# Patient Record
Sex: FEMALE | Race: OTHER | NOT HISPANIC OR LATINO | URBAN - METROPOLITAN AREA
[De-identification: names, ages, dates, MRNs, and addresses within clinical notes are randomized per-mention and may not be internally consistent; named-entity substitution may affect disease eponyms.]

---

## 2017-04-06 ENCOUNTER — EMERGENCY (EMERGENCY)
Facility: HOSPITAL | Age: 57
LOS: 0 days | Discharge: HOME | End: 2017-04-07
Admitting: INTERNAL MEDICINE

## 2017-06-27 DIAGNOSIS — Z79.899 OTHER LONG TERM (CURRENT) DRUG THERAPY: ICD-10-CM

## 2017-06-27 DIAGNOSIS — R05 COUGH: ICD-10-CM

## 2017-06-27 DIAGNOSIS — J45.909 UNSPECIFIED ASTHMA, UNCOMPLICATED: ICD-10-CM

## 2017-06-27 DIAGNOSIS — R50.9 FEVER, UNSPECIFIED: ICD-10-CM

## 2017-06-27 DIAGNOSIS — R06.02 SHORTNESS OF BREATH: ICD-10-CM

## 2017-06-27 DIAGNOSIS — Z91.041 RADIOGRAPHIC DYE ALLERGY STATUS: ICD-10-CM

## 2017-06-27 DIAGNOSIS — R53.1 WEAKNESS: ICD-10-CM

## 2017-12-18 ENCOUNTER — EMERGENCY (EMERGENCY)
Facility: HOSPITAL | Age: 57
LOS: 0 days | Discharge: HOME | End: 2017-12-18
Admitting: INTERNAL MEDICINE

## 2017-12-18 DIAGNOSIS — R05 COUGH: ICD-10-CM

## 2017-12-18 DIAGNOSIS — R68.83 CHILLS (WITHOUT FEVER): ICD-10-CM

## 2017-12-18 DIAGNOSIS — Z91.041 RADIOGRAPHIC DYE ALLERGY STATUS: ICD-10-CM

## 2017-12-18 DIAGNOSIS — J45.909 UNSPECIFIED ASTHMA, UNCOMPLICATED: ICD-10-CM

## 2017-12-18 DIAGNOSIS — Z79.899 OTHER LONG TERM (CURRENT) DRUG THERAPY: ICD-10-CM

## 2017-12-18 DIAGNOSIS — R07.89 OTHER CHEST PAIN: ICD-10-CM

## 2018-07-03 ENCOUNTER — EMERGENCY (EMERGENCY)
Facility: HOSPITAL | Age: 58
LOS: 0 days | Discharge: HOME | End: 2018-07-03
Admitting: PHYSICIAN ASSISTANT

## 2018-07-03 VITALS
RESPIRATION RATE: 18 BRPM | HEART RATE: 67 BPM | TEMPERATURE: 98 F | DIASTOLIC BLOOD PRESSURE: 78 MMHG | WEIGHT: 134.92 LBS | SYSTOLIC BLOOD PRESSURE: 130 MMHG | OXYGEN SATURATION: 97 %
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
## 2018-07-03 DIAGNOSIS — E78.5 HYPERLIPIDEMIA, UNSPECIFIED: ICD-10-CM

## 2018-07-03 DIAGNOSIS — S70.12XA CONTUSION OF LEFT THIGH, INITIAL ENCOUNTER: ICD-10-CM

## 2018-07-03 DIAGNOSIS — M54.5 LOW BACK PAIN: ICD-10-CM
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
## 2018-07-03 DIAGNOSIS — Z85.3 PERSONAL HISTORY OF MALIGNANT NEOPLASM OF BREAST: ICD-10-CM

## 2018-07-03 DIAGNOSIS — J45.909 UNSPECIFIED ASTHMA, UNCOMPLICATED: ICD-10-CM
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
## 2018-07-03 DIAGNOSIS — Z90.13 ACQUIRED ABSENCE OF BILATERAL BREASTS AND NIPPLES: ICD-10-CM

## 2018-07-03 DIAGNOSIS — Z91.041 RADIOGRAPHIC DYE ALLERGY STATUS: ICD-10-CM

## 2018-07-03 RX ORDER — METHOCARBAMOL 500 MG/1
2 TABLET, FILM COATED ORAL
Qty: 12 | Refills: 0
Start: 2018-07-03 | End: 2018-07-04

## 2018-07-03 RX ORDER — IBUPROFEN 200 MG
600 TABLET ORAL ONCE
Qty: 0 | Refills: 0 | Status: COMPLETED | OUTPATIENT
Start: 2018-07-03 | End: 2018-07-03

## 2018-07-03 RX ORDER — METHOCARBAMOL 500 MG/1
1000 TABLET, FILM COATED ORAL ONCE
Qty: 0 | Refills: 0 | Status: COMPLETED | OUTPATIENT
Start: 2018-07-03 | End: 2018-07-03

## 2018-07-03 RX ADMIN — METHOCARBAMOL 1000 MILLIGRAM(S): 500 TABLET, FILM COATED ORAL at 20:59

## 2018-07-03 RX ADMIN — Medication 600 MILLIGRAM(S): at 20:59

## 2018-07-03 NOTE — ED PROVIDER NOTE - MUSCULOSKELETAL, MLM
+ left lumbar para-vertebral tenderness without spinous process tenderness ; Spine appears normal, range of motion is not limited

## 2018-07-03 NOTE — ED ADULT TRIAGE NOTE - CHIEF COMPLAINT QUOTE
[place] tracks, luckily people got me out. Now I have pain in the pack and back of my left thigh - patient

## 2018-07-03 NOTE — ED PROVIDER NOTE - OBJECTIVE STATEMENT
57 y/o F, PMHx Dyslipidemia, Breast Cancer - s/p b/l mastectomy/chemotherapy & Asthma, presents to the ED with complaints of left lower back pain and thigh pain s/p mechanical fall six days ago. Patient states that as she was stepping on to a train, she slipped and fell on to her buttocks. She sustained a bruise along her left thigh that has been improving. A passerby helped her up and she has been ambulating without difficulty. She denies any head trauma/LOC, abdominal pain, bowel/bladder incontinence, urinary symptoms, numbness/tingling to lower extremities, chest pain, dyspnea and additional injuries. She denies any blood thinner use and has been taking Alleve at home with moderate symptomatic improvement.

## 2018-09-21 PROBLEM — C50.919 MALIGNANT NEOPLASM OF UNSPECIFIED SITE OF UNSPECIFIED FEMALE BREAST: Chronic | Status: ACTIVE | Noted: 2018-07-03

## 2018-09-21 PROBLEM — Z00.00 ENCOUNTER FOR PREVENTIVE HEALTH EXAMINATION: Status: ACTIVE | Noted: 2018-09-21

## 2018-09-21 PROBLEM — J45.909 UNSPECIFIED ASTHMA, UNCOMPLICATED: Chronic | Status: ACTIVE | Noted: 2018-07-03

## 2018-09-24 ENCOUNTER — APPOINTMENT (OUTPATIENT)
Dept: PULMONOLOGY | Facility: CLINIC | Age: 58
End: 2018-09-24

## 2018-11-02 ENCOUNTER — APPOINTMENT (OUTPATIENT)
Dept: PULMONOLOGY | Facility: CLINIC | Age: 58
End: 2018-11-02
Payer: COMMERCIAL

## 2018-11-02 VITALS
SYSTOLIC BLOOD PRESSURE: 102 MMHG | DIASTOLIC BLOOD PRESSURE: 62 MMHG | OXYGEN SATURATION: 94 % | BODY MASS INDEX: 21.53 KG/M2 | HEIGHT: 66 IN | WEIGHT: 134 LBS | TEMPERATURE: 98.5 F | HEART RATE: 77 BPM

## 2018-11-02 DIAGNOSIS — F51.04 PSYCHOPHYSIOLOGIC INSOMNIA: ICD-10-CM

## 2018-11-02 DIAGNOSIS — Z78.9 OTHER SPECIFIED HEALTH STATUS: ICD-10-CM

## 2018-11-02 DIAGNOSIS — J45.909 UNSPECIFIED ASTHMA, UNCOMPLICATED: ICD-10-CM

## 2018-11-02 PROCEDURE — 99204 OFFICE O/P NEW MOD 45 MIN: CPT

## 2018-11-02 RX ORDER — ANASTROZOLE TABLETS 1 MG/1
1 TABLET ORAL
Refills: 0 | Status: ACTIVE | COMMUNITY

## 2018-11-02 RX ORDER — TIOTROPIUM BROMIDE 18 UG/1
18 CAPSULE ORAL; RESPIRATORY (INHALATION)
Refills: 0 | Status: ACTIVE | COMMUNITY

## 2018-11-02 RX ORDER — ALBUTEROL SULFATE 90 UG/1
108 (90 BASE) AEROSOL, METERED RESPIRATORY (INHALATION)
Refills: 0 | Status: ACTIVE | COMMUNITY

## 2018-11-02 RX ORDER — FLUTICASONE PROPIONATE AND SALMETEROL 50; 250 UG/1; UG/1
250-50 POWDER RESPIRATORY (INHALATION)
Refills: 0 | Status: ACTIVE | COMMUNITY

## 2018-11-02 RX ORDER — ALBUTEROL SULFATE 2.5 MG/3ML
(2.5 MG/3ML) SOLUTION RESPIRATORY (INHALATION)
Refills: 0 | Status: ACTIVE | COMMUNITY

## 2018-11-02 RX ORDER — SIMVASTATIN 20 MG/1
20 TABLET, FILM COATED ORAL
Refills: 0 | Status: ACTIVE | COMMUNITY

## 2019-09-26 ENCOUNTER — OUTPATIENT (OUTPATIENT)
Dept: OUTPATIENT SERVICES | Facility: HOSPITAL | Age: 59
LOS: 1 days | Discharge: HOME | End: 2019-09-26

## 2019-09-26 DIAGNOSIS — M15.0 PRIMARY GENERALIZED (OSTEO)ARTHRITIS: ICD-10-CM

## 2019-09-26 DIAGNOSIS — M54.2 CERVICALGIA: ICD-10-CM

## 2020-09-28 ENCOUNTER — RESULT REVIEW (OUTPATIENT)
Age: 60
End: 2020-09-28

## 2020-12-20 ENCOUNTER — EMERGENCY (EMERGENCY)
Facility: HOSPITAL | Age: 60
LOS: 0 days | Discharge: HOME | End: 2020-12-20
Attending: EMERGENCY MEDICINE | Admitting: EMERGENCY MEDICINE
Payer: COMMERCIAL

## 2020-12-20 VITALS
DIASTOLIC BLOOD PRESSURE: 73 MMHG | WEIGHT: 160.06 LBS | SYSTOLIC BLOOD PRESSURE: 103 MMHG | RESPIRATION RATE: 18 BRPM | TEMPERATURE: 99 F | OXYGEN SATURATION: 96 % | HEART RATE: 82 BPM

## 2020-12-20 DIAGNOSIS — Z20.828 CONTACT WITH AND (SUSPECTED) EXPOSURE TO OTHER VIRAL COMMUNICABLE DISEASES: ICD-10-CM

## 2020-12-20 DIAGNOSIS — Z91.041 RADIOGRAPHIC DYE ALLERGY STATUS: ICD-10-CM

## 2020-12-20 DIAGNOSIS — J45.909 UNSPECIFIED ASTHMA, UNCOMPLICATED: ICD-10-CM

## 2020-12-20 PROCEDURE — 99284 EMERGENCY DEPT VISIT MOD MDM: CPT

## 2020-12-20 NOTE — ED PROVIDER NOTE - OBJECTIVE STATEMENT
59 y/o female with a PMH of breast cancer in remission, asthma, and DLD presents to the ED requesting COVID swab because she needs a negative test in order to get a breathing test done at [facility] on 12/14. pt denies fever, chills, chest pain, sob, abdominal pain, n/v/d/c, bodyaches, rashes, urinary symptoms, dizziness, or recent sick contacts.

## 2020-12-20 NOTE — ED PROVIDER NOTE - PHYSICAL EXAMINATION
Physical Exam    Vital Signs: I have reviewed the initial vital signs.  Constitutional: well-nourished, appears stated age, no acute distress  Eyes: Conjunctiva pink, Sclera clear  Cardiovascular: S1 and S2, regular rate, regular rhythm, well-perfused extremities, radial pulses equal and 2+ b/l.   Respiratory: unlabored respiratory effort, clear to auscultation bilaterally no wheezing, rales and rhonchi. pt is speaking full sentences. no accessory muscle use.   Gastrointestinal: soft, non-tender, nondistended abdomen, no pulsatile mass, normal bowl sounds, no rebound, no guarding, no organomegaly.   Musculoskeletal: FROM of b/l upper and lower extremities.   Integumentary: warm, dry, no rash  Neurologic: awake, alert

## 2020-12-20 NOTE — ED PROVIDER NOTE - NSFOLLOWUPINSTRUCTIONS_ED_ALL_ED_FT
Novel Coronavirus (COVID-19)  The Facts  What is a coronavirus?  Coronaviruses are a large family of viruses that cause illnesses ranging from the common cold  to more severe diseases such as Middle East Respiratory Syndrome (MERS) and Severe Acute  Respiratory Syndrome (SARS).  What is Novel Coronavirus (COVID-19)?  COVID-19 is a new strain of Coronavirus that has not been previously identified in humans. COVID-19  was identified in [place], [place], [place] in December 2019 (COVID-19). COVID-19 has  since been identified outside of [place], in a growing number of countries internationally, including  the [place].  Where can I find the most recent information about COVID-19?  The Centers for Disease Control and Prevention (CDC) is closely monitoring the outbreak caused by the  COVID-19. For the latest information about COVID- 19, visit the CDC website at  https://www.cdc.gov/coronavirus/index.html  How are coronaviruses spread?  Coronaviruses can be transmitted from person-to- person, usually after close contact with an infected person,  for example, in a household, workplace, or healthcare setting via droplets that become airborne after a cough  or sneeze by an affected person. These droplets can then infect a nearby person. It is likely transmission also  occurs by touching recently contaminated surfaces.  What are the symptoms of coronavirus infection?  It depends on the virus, but common signs include fever and/or respiratory symptoms such as  cough and shortness of breath. In more severe cases, infection can cause pneumonia, severe acute  respiratory syndrome, kidney failure and even death. Fortunately, most cases of COVID-19 have an  illness no different than the influenza “flu”. With a majority of these patients having mild symptoms  and overall mortality which appears to be not much different than the flu.  Is there a treatment for a COVID-19?  There is no specific treatment for disease caused by COVID-19. However, many of the symptoms can  be treated based on the patient’s clinical condition. Supportive care for infected persons can be highly  effective.  What can I do to protect myself?  Washing your hands, covering your cough, and disinfecting surfaces are the best precautionary  measures. It is also advisable to avoid close contact with anyone showing symptoms of respiratory  illness such as coughing and sneezing. Those with symptoms should wear a surgical mask when  around others.  What can I do to protect those around me?  If you have been identified as someone who may be infected with COVID-19, we recommend you  follow the self-isolation procedures outlined below to protect those around you and limit the spread  of this virus.   March 3, 2020  Recommendations for Patients Advised to Self-Isolate  for Possible COVID-19 Exposure  We recommend the below precautionary steps from now until 14 days from when you  returned from your travel or date of your last known possible contact:  - Do not go to work, school, or public areas. Avoid using public transportation, ride-sharing, or  taxis.  - As much as possible, separate yourself from other people in your home. If you can, you should  stay in a room and away from other people in your home. Also, you should use a separate  bathroom, if available.  - Wear the supplied mask whenever you are around other people.  - If you have a non-urgent medical appointment, please reschedule for a later date. If the  appointment is urgent, please call the healthcare provider and tell them that you are on selfisolation for possible COVID-19. This will help the healthcare provider’s office take steps to keep  other people from getting infected or exposed. If you can reschedule routine appointments, do  so.  - Wash your hands often with soap and water for at least 15 to 20 seconds or clean your hands  with an alcohol-based hand  that contains 60 to 95% alcohol, covering all surfaces of  your hands and rubbing them together until they feel dry. Soap and water should be used  preferentially if hands are visibly dirty.  - Cover your mouth and nose with a tissue when you cough or sneeze. Throw used tissues in a  lined trash can; immediately wash your hands.  - Avoid touching your eyes, nose, and mouth with your hands.  - Avoid sharing personal household items. You should not share dishes, drinking glasses, cups,  eating utensils, towels, or bedding with other people or pets in your home. After using these  items, they should be washed thoroughly with soap and water.  - Clean and disinfect all “high-touch” surfaces every day. High touch surfaces include counters,  tabletops, doorknobs, light switches, remote controls, bathroom fixtures, toilets, phones,  keyboards, tablets, and bedside tables. Also, clean any surfaces that may have blood, stool, or  body fluids on them.       If you develop worsening symptoms:  - If you develop worsening symptoms, such as severe shortness of breath, please call [phone] option [phone]. They will assist you in determining your next steps.  During your time on self-isolation do the following:  - Work from home if you are able to so.  - Limit social isolation by talking with friends and family on the phone or with face-time  - Talk with friends and relatives who don’t live with you about supporting each other if one  household has to be quarantined. For example, agree to drop groceries or other supplies at the  front door.  - Exercise and spend time outdoors away from others if able to do so.    Why didn’t I get tested for novel coronavirus (COVID-19)?  The number of available tests is very limited so strict rules exist for who is allowed to be tested.  [facility] has been authorized to perform testing and is currently working hard to be  able to start providing the test. Such testing is currently reserved for patients who have had  contact with someone infected with the virus, or those who are very sick a plus those who have  traveled to areas identified by the Centers for Disease Control and Prevention (CD) and will  require hospitalization.  What should I do now?  If you are well enough to be discharged home and are not in a high risk group to have  contracted the COVID-10, you should care for yourself at home exactly like you would if you  have Influenza “flu”. Follow all the standard guidelines about washing your hands, covering  your cough, etc.  You should return to the Emergency Department if you develop worse symptoms, trouble  breathing, chest pain, and/or a fever that doesn’t improve with over the counter  acetaminophen or ibuprofen.

## 2020-12-20 NOTE — ED PROVIDER NOTE - NS ED ROS FT
Gastroenterology Internal Medicine Internal Medicine CONST: No fever, chills or bodyaches  EYES: No pain, redness, drainage or visual changes.  ENT: No ear pain or discharge, nasal discharge or congestion. No sore throat  CARD: No chest pain, palpitations  RESP: No SOB, cough, hemoptysis. No hx of asthma or COPD  GI: No abdominal pain, N/V/D  : No urinary symptoms  MS: No joint pain, back pain or extremity pain/injury  SKIN: No rashes  NEURO: No headache, dizziness, paresthesias or LOC

## 2020-12-21 LAB — SARS-COV-2 RNA SPEC QL NAA+PROBE: SIGNIFICANT CHANGE UP

## 2021-02-12 ENCOUNTER — APPOINTMENT (OUTPATIENT)
Dept: ORTHOPEDIC SURGERY | Facility: CLINIC | Age: 61
End: 2021-02-12
Payer: COMMERCIAL

## 2021-02-12 VITALS — TEMPERATURE: 98.1 F

## 2021-02-12 DIAGNOSIS — M25.561 PAIN IN RIGHT KNEE: ICD-10-CM

## 2021-02-12 DIAGNOSIS — M25.562 PAIN IN LEFT KNEE: ICD-10-CM

## 2021-02-12 PROCEDURE — 99203 OFFICE O/P NEW LOW 30 MIN: CPT

## 2021-02-12 PROCEDURE — 73564 X-RAY EXAM KNEE 4 OR MORE: CPT | Mod: RT

## 2021-02-12 PROCEDURE — 99072 ADDL SUPL MATRL&STAF TM PHE: CPT

## 2021-03-05 NOTE — HISTORY OF PRESENT ILLNESS
[Pain Location] : pain [8] : a maximum pain level of 8/10 [Walking] : walking [Standing] : standing [Constant] : ~He/She~ states the symptoms seem to be constant [0] : a current pain level of 0/10 [None] : No exacerbating factors are noted [de-identified] : 61 year old female presents to the office today complaining of bilateral knee pain, worsened in the last year. Right knee is more painful than the left. Patient reports having seen an orthopedist in the past. She  reports pain that is achy in nature. There is swelling and clicking. She reports only being able to ambulate about 2 blocks before pain stops her. There is pain and difficulty with negotiating stairs. There is also pain with standing from a seated position and getting into a seated position. Patient report staking Tylenol if her pain is severe but denies any relief with this. She reports doing physical therapy in October 2020, but stopping because it did not give any relief. She reports having an injection in the past as well with only a few months of relief. Patient is here today to discuss options for pain relief.

## 2021-03-05 NOTE — ASSESSMENT
[FreeTextEntry1] : 61 year old female presents to the office complaining of bilateral knee pain left worse than right. Pain is 8/10, it is primarily while negotiating stairs. The pt is currently being treated for Sx at [facility] and was under the care of an orthopedic surgeon there. She was receiving injections but does not know what kind they are. She now comes here for Tx. We did discuss tx options and she was interested in gel injections. I do think that it is a good option for her, we will put in for approval and start her on them once we have the product. \par \par **ORDER GEL INJECTIONS FOR BILATERAL KNEES ***

## 2021-03-05 NOTE — PHYSICAL EXAM
[de-identified] : General appearance: well nourished and hydrated, pleasant, alert and oriented x 3, cooperative.\par Cardiovascular: no apparent abnormalities, no lower leg edema, no varicosities, pedal pulses are palpable.\par Neurologic: sensation is normal, no muscle weakness in upper or lower extremities\par Dermatologic no apparent skin lesions, moist, warm, no rash.\par Gait: nonantalgic.\par \par Left knee\par Inspection: no effusion or erythema.\par Wounds: none.\par Alignment: normal.\par Palpation: significant painful PF crepitus \par ROM: 0-120 degrees\par Ligamentous laxity: all ligaments appear stable\par Muscle Test: good quad strength.\par \par Right knee\par Inspection: no effusion or erythema.\par Wounds: none.\par Alignment: normal.\par Palpation: significant painful PF crepitus \par ROM: 0-120 degrees\par Ligamentous laxity: all ligaments appear stable\par Muscle Test: good quad strength.\par \par Left hip\par Inspection: No swelling or ecchymosis.\par Wounds: none.\par Palpation: non-tender.\par Stability: no instability.\par Strength: 5/5 all motor groups.\par ROM: no pain with FROM.\par Leg length: equal.\par \par Right hip\par Inspection: No swelling or ecchymosis.\par Wounds: none.\par Palpation: non-tender.\par Stability: no instability.\par Strength: 5/5 all motor groups.\par ROM: no pain with FROM.\par Leg length: equal. [de-identified] : Radiographs done today AP lateral and skyline of both knees shows well maintained TF joint spaces , severe PF joint space loss of the left knee and moderate on the right on skyline views

## 2021-04-01 ENCOUNTER — APPOINTMENT (OUTPATIENT)
Dept: ORTHOPEDIC SURGERY | Facility: HOSPITAL | Age: 61
End: 2021-04-01
Payer: COMMERCIAL

## 2021-04-01 VITALS — TEMPERATURE: 98.1 F

## 2021-04-01 DIAGNOSIS — M17.11 UNILATERAL PRIMARY OSTEOARTHRITIS, RIGHT KNEE: ICD-10-CM

## 2021-04-01 DIAGNOSIS — M17.12 UNILATERAL PRIMARY OSTEOARTHRITIS, LEFT KNEE: ICD-10-CM

## 2021-04-01 PROCEDURE — 20610 DRAIN/INJ JOINT/BURSA W/O US: CPT | Mod: RT

## 2021-04-01 PROCEDURE — 99072 ADDL SUPL MATRL&STAF TM PHE: CPT

## 2021-04-01 RX ORDER — HYALURONATE SOD, CROSS-LINKED 30 MG/3 ML
30 SYRINGE (ML) INTRAARTICULAR
Refills: 0 | Status: COMPLETED | OUTPATIENT
Start: 2021-04-01

## 2021-04-01 RX ORDER — HYALURONATE SOD, CROSS-LINKED 30 MG/3 ML
30 SYRINGE (ML) INTRAARTICULAR
Qty: 0 | Refills: 0 | Status: COMPLETED | OUTPATIENT
Start: 2021-04-01

## 2021-04-01 RX ADMIN — Medication 0 MG/3ML: at 00:00

## 2021-04-01 NOTE — ASSESSMENT
[FreeTextEntry1] : Discussed at length with the patient the planned Gel one injection. The risks, benefits, convalescence and alternatives were reviewed. The possible side effects discussed included but were not limited to: pain, swelling, heat and redness. There symptoms are generally mild but if they are extensive then contact the office. \par \par Following the discussion, the knee was prepped with Betadine and under sterile technique the Gel one injection was performed. The needle was introduced into the joint, aspiration was performed to ensure intra-articular placement and the medication was injected. Upon withdrawal of the needle the site was cleaned with alcohol and a Band-Aid applied. The patient tolerated the injection well and there were no adverse effects. Post injection instructions included not strenuous activity for 24 hours, cryotherapy and if there were any adverse effects to contact the office.\par

## 2021-04-01 NOTE — HISTORY OF PRESENT ILLNESS
[de-identified] : 61 year old female presents to the office today for Gel one injections into her bilateral arthritic knees.

## 2022-12-04 ENCOUNTER — EMERGENCY (EMERGENCY)
Facility: HOSPITAL | Age: 62
LOS: 0 days | Discharge: HOME | End: 2022-12-04
Attending: EMERGENCY MEDICINE | Admitting: EMERGENCY MEDICINE

## 2022-12-04 VITALS
SYSTOLIC BLOOD PRESSURE: 126 MMHG | DIASTOLIC BLOOD PRESSURE: 86 MMHG | HEART RATE: 90 BPM | TEMPERATURE: 98 F | RESPIRATION RATE: 22 BRPM | WEIGHT: 149.91 LBS | OXYGEN SATURATION: 94 %

## 2022-12-04 VITALS
OXYGEN SATURATION: 100 % | TEMPERATURE: 99 F | RESPIRATION RATE: 20 BRPM | SYSTOLIC BLOOD PRESSURE: 117 MMHG | HEART RATE: 100 BPM | DIASTOLIC BLOOD PRESSURE: 98 MMHG

## 2022-12-04 DIAGNOSIS — Z20.822 CONTACT WITH AND (SUSPECTED) EXPOSURE TO COVID-19: ICD-10-CM

## 2022-12-04 DIAGNOSIS — R05.9 COUGH, UNSPECIFIED: ICD-10-CM

## 2022-12-04 DIAGNOSIS — Z85.3 PERSONAL HISTORY OF MALIGNANT NEOPLASM OF BREAST: ICD-10-CM

## 2022-12-04 DIAGNOSIS — J45.909 UNSPECIFIED ASTHMA, UNCOMPLICATED: ICD-10-CM

## 2022-12-04 DIAGNOSIS — R06.02 SHORTNESS OF BREATH: ICD-10-CM

## 2022-12-04 DIAGNOSIS — Z91.041 RADIOGRAPHIC DYE ALLERGY STATUS: ICD-10-CM

## 2022-12-04 LAB
ALBUMIN SERPL ELPH-MCNC: 4.3 G/DL — SIGNIFICANT CHANGE UP (ref 3.5–5.2)
ALP SERPL-CCNC: 92 U/L — SIGNIFICANT CHANGE UP (ref 30–115)
ALT FLD-CCNC: 20 U/L — SIGNIFICANT CHANGE UP (ref 0–41)
ANION GAP SERPL CALC-SCNC: 12 MMOL/L — SIGNIFICANT CHANGE UP (ref 7–14)
AST SERPL-CCNC: 41 U/L — SIGNIFICANT CHANGE UP (ref 0–41)
BASOPHILS # BLD AUTO: 0.03 K/UL — SIGNIFICANT CHANGE UP (ref 0–0.2)
BASOPHILS NFR BLD AUTO: 0.3 % — SIGNIFICANT CHANGE UP (ref 0–1)
BILIRUB SERPL-MCNC: 0.3 MG/DL — SIGNIFICANT CHANGE UP (ref 0.2–1.2)
BUN SERPL-MCNC: 18 MG/DL — SIGNIFICANT CHANGE UP (ref 10–20)
CALCIUM SERPL-MCNC: 9.2 MG/DL — SIGNIFICANT CHANGE UP (ref 8.4–10.5)
CHLORIDE SERPL-SCNC: 98 MMOL/L — SIGNIFICANT CHANGE UP (ref 98–110)
CO2 SERPL-SCNC: 27 MMOL/L — SIGNIFICANT CHANGE UP (ref 17–32)
CREAT SERPL-MCNC: 0.7 MG/DL — SIGNIFICANT CHANGE UP (ref 0.7–1.5)
EGFR: 97 ML/MIN/1.73M2 — SIGNIFICANT CHANGE UP
EOSINOPHIL # BLD AUTO: 0.02 K/UL — SIGNIFICANT CHANGE UP (ref 0–0.7)
EOSINOPHIL NFR BLD AUTO: 0.2 % — SIGNIFICANT CHANGE UP (ref 0–8)
FLUAV AG NPH QL: SIGNIFICANT CHANGE UP
FLUBV AG NPH QL: SIGNIFICANT CHANGE UP
GLUCOSE SERPL-MCNC: 109 MG/DL — HIGH (ref 70–99)
HCT VFR BLD CALC: 43.1 % — SIGNIFICANT CHANGE UP (ref 37–47)
HGB BLD-MCNC: 14.5 G/DL — SIGNIFICANT CHANGE UP (ref 12–16)
IMM GRANULOCYTES NFR BLD AUTO: 0.3 % — SIGNIFICANT CHANGE UP (ref 0.1–0.3)
LYMPHOCYTES # BLD AUTO: 1.86 K/UL — SIGNIFICANT CHANGE UP (ref 1.2–3.4)
LYMPHOCYTES # BLD AUTO: 15.7 % — LOW (ref 20.5–51.1)
MCHC RBC-ENTMCNC: 29.4 PG — SIGNIFICANT CHANGE UP (ref 27–31)
MCHC RBC-ENTMCNC: 33.6 G/DL — SIGNIFICANT CHANGE UP (ref 32–37)
MCV RBC AUTO: 87.4 FL — SIGNIFICANT CHANGE UP (ref 81–99)
MONOCYTES # BLD AUTO: 0.97 K/UL — HIGH (ref 0.1–0.6)
MONOCYTES NFR BLD AUTO: 8.2 % — SIGNIFICANT CHANGE UP (ref 1.7–9.3)
NEUTROPHILS # BLD AUTO: 8.92 K/UL — HIGH (ref 1.4–6.5)
NEUTROPHILS NFR BLD AUTO: 75.3 % — HIGH (ref 42.2–75.2)
NRBC # BLD: 0 /100 WBCS — SIGNIFICANT CHANGE UP (ref 0–0)
PLATELET # BLD AUTO: 335 K/UL — SIGNIFICANT CHANGE UP (ref 130–400)
POTASSIUM SERPL-MCNC: 5.5 MMOL/L — HIGH (ref 3.5–5)
POTASSIUM SERPL-SCNC: 5.5 MMOL/L — HIGH (ref 3.5–5)
PROT SERPL-MCNC: 7.1 G/DL — SIGNIFICANT CHANGE UP (ref 6–8)
RBC # BLD: 4.93 M/UL — SIGNIFICANT CHANGE UP (ref 4.2–5.4)
RBC # FLD: 13.9 % — SIGNIFICANT CHANGE UP (ref 11.5–14.5)
RSV RNA NPH QL NAA+NON-PROBE: SIGNIFICANT CHANGE UP
SARS-COV-2 RNA SPEC QL NAA+PROBE: SIGNIFICANT CHANGE UP
SODIUM SERPL-SCNC: 137 MMOL/L — SIGNIFICANT CHANGE UP (ref 135–146)
WBC # BLD: 11.83 K/UL — HIGH (ref 4.8–10.8)
WBC # FLD AUTO: 11.83 K/UL — HIGH (ref 4.8–10.8)

## 2022-12-04 PROCEDURE — 99285 EMERGENCY DEPT VISIT HI MDM: CPT

## 2022-12-04 PROCEDURE — 71046 X-RAY EXAM CHEST 2 VIEWS: CPT | Mod: 26

## 2022-12-04 RX ORDER — IPRATROPIUM/ALBUTEROL SULFATE 18-103MCG
3 AEROSOL WITH ADAPTER (GRAM) INHALATION ONCE
Refills: 0 | Status: COMPLETED | OUTPATIENT
Start: 2022-12-04 | End: 2022-12-04

## 2022-12-04 RX ORDER — ACETAMINOPHEN 500 MG
650 TABLET ORAL ONCE
Refills: 0 | Status: COMPLETED | OUTPATIENT
Start: 2022-12-04 | End: 2022-12-04

## 2022-12-04 RX ORDER — KETOROLAC TROMETHAMINE 30 MG/ML
15 SYRINGE (ML) INJECTION ONCE
Refills: 0 | Status: DISCONTINUED | OUTPATIENT
Start: 2022-12-04 | End: 2022-12-04

## 2022-12-04 RX ORDER — ALBUTEROL 90 UG/1
2.5 AEROSOL, METERED ORAL ONCE
Refills: 0 | Status: COMPLETED | OUTPATIENT
Start: 2022-12-04 | End: 2022-12-04

## 2022-12-04 RX ADMIN — Medication 3 MILLILITER(S): at 09:30

## 2022-12-04 RX ADMIN — ALBUTEROL 2.5 MILLIGRAM(S): 90 AEROSOL, METERED ORAL at 15:05

## 2022-12-04 RX ADMIN — Medication 650 MILLIGRAM(S): at 14:19

## 2022-12-04 RX ADMIN — Medication 650 MILLIGRAM(S): at 09:06

## 2022-12-04 RX ADMIN — Medication 3 MILLILITER(S): at 09:07

## 2022-12-04 RX ADMIN — Medication 125 MILLIGRAM(S): at 10:00

## 2022-12-04 RX ADMIN — Medication 15 MILLIGRAM(S): at 17:24

## 2022-12-04 RX ADMIN — Medication 3 MILLILITER(S): at 13:07

## 2022-12-04 NOTE — ED PROVIDER NOTE - PHYSICAL EXAMINATION
--EXAM--  VITAL SIGNS: I have reviewed vs documented at present.  CONSTITUTIONAL: Well-developed; well-nourished; in no acute distress.   SKIN: Warm and dry, no acute rash.   HEAD: Normocephalic; atraumatic.    NECK: Supple; non tender.  CARD: S1, S2, Regular rate and rhythm.   RESP: positive wheezes,no rales or rhonchi.    EXT: Normal ROM.   NEURO: Alert, oriented, grossly unremarkable. Strength 5/5 in all extremities. Sensation intact throughout.  PSYCH: Cooperative, appropriate.

## 2022-12-04 NOTE — ED ADULT TRIAGE NOTE - CHIEF COMPLAINT QUOTE
Pt states "I have shortness of breath and a cough". Pt states "I have shortness of breath, a cough and my neck hurts for one week".

## 2022-12-04 NOTE — ED PROVIDER NOTE - NS ED ROS FT
Review of Systems:  	•	CONSTITUTIONAL - no fever, no diaphoresis, no chills  	•	SKIN - no rash  	•	HEMATOLOGIC - no bleeding, no bruising  	•	EYES - no eye pain, no blurry vision  	•	ENT - no change in hearing, no sore throat, no ear pain or tinnitus  	•	RESPIRATORY -  shortness of breath, no cough  	•	CARDIAC - no chest pain, no palpitations  	•	GI - no abd pain, no nausea, no vomiting, no diarrhea, no constipation  	•	GENITO-URINARY - no discharge, no dysuria; no hematuria, no increased urinary frequency  	•	MUSCULOSKELETAL - no joint paint, no swelling, no redness  	•	NEUROLOGIC - no weakness, no headache, no paresthesias, no LOC  	•	PSYCH - no anxiety, non suicidal, non homicidal, no hallucination, no depression

## 2022-12-04 NOTE — ED PROVIDER NOTE - OBJECTIVE STATEMENT
62-year-old female presents to the ED for evaluation of shortness of breath and cough.  Patient has a history of asthma

## 2022-12-04 NOTE — ED PROVIDER NOTE - PATIENT PORTAL LINK FT
You can access the FollowMyHealth Patient Portal offered by [facility] by registering at the following website: [email or web address]. By joining [employer]’s FollowMyHealth portal, you will also be able to view your health information using other applications (apps) compatible with our system.

## 2022-12-04 NOTE — ED PROVIDER NOTE - PROGRESS NOTE DETAILS
Spoke to patient and patient's .  Patient would like to remain in the ED for observation to see how she feels after she rest and not eat something Reevaluated patient lungs clear to auscultation.  Patient is feeling better spoke with  as well patient will be discharged

## 2022-12-04 NOTE — ED PROVIDER NOTE - CLINICAL SUMMARY MEDICAL DECISION MAKING FREE TEXT BOX
patient given albuterol treatment as well as Solu-Medrol in addition given Toradol patient was observed here in the emergency department she improved here reassessed she is clear to auscultation bilaterally no evidence of pneumonia she is able to ambulate up and down the hallway with no worsening symptoms I will discharge follow-up to her PMD in the next 24 hours Patient's symptoms are not consistent with ACS EKG is not consistent with STEMI unlikely PE given patient's physical exam findings

## 2022-12-04 NOTE — ED ADULT NURSE NOTE - NSICDXPASTMEDICALHX_GEN_ALL_CORE_FT
Child & Teen Check Up Year 10-       Child Health History       SPORTS QUESTIONNAIRE:  ======================                             Grade: 7th                    Sports: Football  1. no - Has a doctor ever denied or restricted your participation in sports for any reason or told you to give up sports?  2. Yes - Do you have an ongoing medical condition (like diabetes,asthma, anemia, infections)? Asthma  3. no - Are you currently taking any prescription or nonprescription (over-the-counter) medicines or pills?    4. no - Do you have allergies to medicines, pollens, foods or stinging insects?    5. no - Have you ever spent the night in a hospital?  6. no - Have you ever had surgery?   7. no - Have you ever passed out or nearly passed out DURING exercise?  8. no - Have you ever passed out or nearly passed out AFTER exercise?  9. no -Have you ever had discomfort, pain, tightness, or pressure in your chest during exercise?  10. no -Does your heart race or skip beats (irregular beats) during exercise?   11. no -Has a doctor ever told you that you have ;high blood pressure, a heart murmur, high cholesterol,a heart infection, Rheumatic fever, Kawasaki's Disease?  12. no - Has a doctor ever ordered a test for your heart? (example, ECG/EKG, Echocardiogram, stress test)  13. no -Do you ever get lightheaded or feel more short of breath than expected during exercise?   14. no-Have you ever had an unexplained seizure?   15. no - Do you get more tired or short of breath more quickly than your friends during exercise?   16. no - Has any family member or relative  of heart problems or had an unexpected or unexplained sudden death before age 50 (including unexplained drowning, unexplained car accident or sudden infant death syndrome)?  17. no - Does anyone in your family have hypertrophic cardiomyopathy, Marfan Syndrome, arrhythmogenic right ventricular cardiomyopathy, long QT syndrome, short QT syndrome, Brugada syndrome,  "or catecholaminergic polymorphic ventricular tachycardia?    18. no - Does anyone in your family have a heart problem, pacemaker, or implanted defibrillator? Maternal grandmother - history of CHF; mom's cousin had pacemaker placed at age 16  19. yes -Has anyone in your family had unexplained fainting, unexplained seizures, or near drowning?   20. no - Have you ever had an injury, like a sprain, muscle or ligament tear or tendonitis, that caused you to miss a practice or game?   21. no - Have you had any broken or fractured bones, or dislocated joints?   22 no - Have you had an injury that required x-rays, MRI, CT, surgery, injections, therapy, a brace, a cast, or crutches?    23. no - Have you ever had a stress fracture?   24. no - Have you ever been told that you have or have you had an x-ray for neck instability or atlantoaxial instability? (Down syndrome or dwarfism)  25. no - Do you regularly use a brace, orthotics or assistive device?    26. no -Do you have a bone,muscle, or joint injury that bothers you?   27. no- Do any of your joints become painful, swollen, feel warm or look red?   28. no -Do you have any history of juvenile arthritis or connective tissue disease?   29. yes - Has a doctor ever told you that you have asthma or allergies?   30. yes - Do you cough, wheeze, have chest tightness, or have difficulty breathing during or after exercise? Sometimes with exercise, patient feels \"out of breath a lot, my chest feels tight, and my ribs start hurting\" (pointing to left side)   31. yes - Is there anyone in your family who has asthma?  Mom, sisters   32. yes - Have you ever used an inhaler or taken asthma medicine?   33. no - Do you develop a rash or hives when you exercise?   34. no - Were you born without or are you missing a kidney, an eye, a testicle (males), or any other organ?  35. no- Do you have groin pain or a painful bulge or hernia in the groin area?   36. no - Have you had infectious " mononucleosis (mono) within the last month?   37. no - Do you have any rashes, pressure sores, or other skin problems?   38. no - Have you had a herpes or MRSA skin infection?    39. no - Have you ever had a head injury or concussion?   40. no - Have you ever had a hit or blow in the head that caused confusion, prolonged headaches, or memory problems?    41. no - Do you have a history of seizure disorder?    42. yes - Do you have headaches with exercise?   43. no - Have you ever had numbness, tingling or weakness in your arms or legs after being hit or falling?   44. no - Have you ever been unable to move your arms or legs after being hit or falling?   45. no -Have you ever become ill while exercising in the heat?  46. yes -Do you get frequent muscle cramps when exercising?  47. no - Do you or someone in your family have sickle cell trait or disease?    48. no - Have you had any problems with your eyes or vision?   49. no - Have you had any eye injuries?   50. no - Do you wear glasses or contact lenses?    51. no - Do you wear protective eyewear, such as goggles or a face shield?  52. no- Do you worry about your weight?    53. no - Are you trying to or has anyone recommended that you gain or lose weight?    54. Yes - Are you on a special diet or do you avoid certain types of foods? Avoids meat - does not like meat  55. no- Have you ever had an eating disorder?   56. no - Do you have any concerns that you would like to discuss with a doctor?      Asthma History  Not on controller medication  Prescribed albuterol but does not have any at home  Feels short of breath very frequently  No night awakening, does wake up at night feeling short of breath (1-2x/week)  Daily shortness of breath with and without exercise     Growth Percentile:    Wt Readings from Last 3 Encounters:   09/19/18 100 lb 6.4 oz (45.5 kg) (56 %)*   12/31/14 66 lb 9.6 oz (30.2 kg) (62 %)*   11/19/14 67 lb 12.8 oz (30.8 kg) (69 %)*     * Growth  "percentiles are based on CDC 2-20 Years data.      Ht Readings from Last 2 Encounters:   18 5' 2.75\" (159.4 cm) (75 %)*   14 4' 5.5\" (135.9 cm) (64 %)*     * Growth percentiles are based on CDC 2-20 Years data.    44 %ile based on CDC 2-20 Years BMI-for-age data using vitals from 2018.    Visit Vitals: /68  Pulse 77  Temp 98.4  F (36.9  C) (Oral)  Resp 16  Ht 5' 2.75\" (159.4 cm)  Wt 100 lb 6.4 oz (45.5 kg)  SpO2 100%  BMI 17.93 kg/m2  BP Percentile: Blood pressure percentiles are 33 % systolic and 72 % diastolic based on the 2017 AAP Clinical Practice Guideline. Blood pressure percentile targets: 90: 121/75, 95: 125/79, 95 + 12 mmH/91.      Vision Screen: Passed.  Hearing Screen: Passed.  Informant: Patient and aunt    Family/Patient speaks English and so an  was not used.  Family History:   Family History   Problem Relation Age of Onset     Asthma Mother      Diabetes Maternal Grandmother      Coronary Artery Disease Maternal Grandmother      Cerebrovascular Disease Maternal Grandmother      Hypertension Maternal Grandmother      Breast Cancer Maternal Aunt      Anesthesia Reaction Maternal Aunt        Dyslipidemia Screening:  Pediatric hyperlipidemia risk factors discussed today: No increased risk  Lipid screening performed (recommended if any risk factors): No    Social History:     Did the family/guardian worry about wether their food would run out before they got money to buy more? No  Did the family/guardian find that the food they bought didn't last long enough and they didn't have money to get more?  No     Social History     Social History     Marital status: Single     Spouse name: N/A     Number of children: N/A     Years of education: N/A     Social History Main Topics     Smoking status: Never Smoker     Smokeless tobacco: Never Used      Comment: not around 2nd hand smoke     Alcohol use None     Drug use: None     Sexual activity: Not Asked "     Other Topics Concern     None     Social History Narrative       Medical History:   Past Medical History:   Diagnosis Date     Uncomplicated asthma        Family History and past Medical History reviewed and unchanged/updated.    Parental/or patient concerns:   Asthma  - diagnosed some time ago  - has not been on any medications  - wakes up at night several times a week  - short of breath and chest tightness every time he exercises       Daily Activities:  Nutrition:    Describe intake: fruits, meats, vegetables    Environmental Risks:  Lead exposure: No  TB exposure: No  Guns in house:None    STI Screening:  STI (including HIV) risk behaviors discussed today: No    Development:  Any concerns about how your child is behaving, learning or developing?  No concerns.     Dental:  Has child been to a dentist this year? No-Verbal referral made  for dental check-up     Mental Health:  Teen Screen Discussed?: No     HEADSSS SCREENING:    HOME  Do you get along with your parents/siblings? Yes  Do you have at least one adult you can really talk to? Yes    EDUCATION  Do you have career or college plans after high school? Details: Unsure    ACTIVITIES  Do you get some exercise at least 3 times a week? Yes  Do you feel you are about the right weight for your height? Yes    DRUGS   Do you smoke cigarettes or chew tobacco? No   Do you drink alcohol or use any type of drugs? No    SUICIDE/DEPRESSION  Do you ever feel down or depressed? No    Nutrition: Healthy between-meal snacks, Safety: Seat belts, helmets. and Guidance: School attendance, homework         ROS   GENERAL: no recent fevers and activity level has been normal  SKIN: Negative for rash, birthmarks, acne, pigmentation changes  HEENT: Negative for hearing problems, vision problems, nasal congestion, eye discharge and eye redness  RESP: No cough, wheezing, difficulty breathing  CV: No cyanosis, fatigue with feeding  GI: Normal stools for age, no diarrhea or  "constipation   : Normal urination, no disharge or painful urination  MS: No swelling, muscle weakness, joint problems  NEURO: Moves all extremeties normally, normal activity for age  ALLERGY/IMMUNE: See allergy in history         Physical Exam:   /68  Pulse 77  Temp 98.4  F (36.9  C) (Oral)  Resp 16  Ht 5' 2.75\" (159.4 cm)  Wt 100 lb 6.4 oz (45.5 kg)  SpO2 100%  BMI 17.93 kg/m2     GENERAL: Alert, well nourished, well developed, no acute distress, interacts appropriately for age  SKIN: skin is clear, no rash, acne, abnormal pigmentation or lesions  HEAD: The head is normocephalic.  EYES:The conjunctivae and cornea normal. PERRL, EOMI, Light reflex is symmetric and no eye movement on cover/uncover test. Sharp optic discs  EARS: The external auditory canals are clear and the tympanic membranes are normal; gray and transluscent.  NOSE: Clear, no discharge or congestion  MOUTH/THROAT: The throat is clear, tonsils:normal, no exudate or lesions. Normal teeth without obvious abnormalities  NECK: The neck is supple and thyroid is normal, no masses  LYMPH NODES: No adenopathy  LUNGS: The lung fields are clear to auscultation,no rales, rhonchi, wheezing or retractions  HEART: The precordium is quiet. Rhythm is regular. S1 and S2 are normal. No murmurs.  ABDOMEN: The bowel sounds are normal. Abdomen soft, non tender,  non distended, no masses or hepatosplenomegaly.  EXTREMITIES: Symmetric extremities, FROM, no deformities. Spine is straight, no scoliosis  NEUROLOGIC: No focal findings. Cranial nerves grossly intact: DTR's normal. Normal gait, strength and tone  Shoulder: Normal  Elbow: Normal  Hand/Wrist: Normal  Back: Normal  Quad/Ham: Normal  Knee: Normal  Ankle/Feet: Normal  Heel/Toe: Normal  Duck walk: Normal  : declined            Assessment and Plan   #[facility] 11 year old  - growth/development within normal limits  - achieving appropriate milestones   - vaccinations as ordered  - growth chart reviewed, " reassurance provided  - follow up in 4 weeks for asthma follow up    #Moderate persistent asthma   - albuterol q6h scheduled for 1 week  - flovent 2 puffs BID daily  - after 1 week, albuterol PRN   - follow up in 4 weeks    #Chest tightness with exercise  - likely 2/2 to exercise  - EKG checked - early repolarization noted  - ECHO pending    #Sports physical  - not cleared to play until:   - asthma is better controlled   - ECHO is done  - return in 4 weeks for follow up and can re-asses     BMI at 44 %ile based on CDC 2-20 Years BMI-for-age data using vitals from 9/19/2018.  No weight concerns.           PAST MEDICAL HISTORY:  Asthma     Breast cancer

## 2022-12-04 NOTE — ED PROVIDER NOTE - NS ED ATTENDING STATEMENT MOD
This was a shared visit with the MACEY. I reviewed and verified the documentation and independently performed the documented:

## 2022-12-04 NOTE — ED PROVIDER NOTE - ATTENDING APP SHARED VISIT CONTRIBUTION OF CARE
I was present for and supervised the key and critical aspects of the procedures performed during the care of the patient. Patient is a 62-year-old female presents the emergency department for evaluation of shortness of breath and wheezing she has a history of asthma history of admissions however never intubated denies any chest pain denies any fevers chills denies any abdominal pain back pain or extremity weakness    On physical exam patient is normocephalic atraumatic pupils are equal round reactive light accommodation extraocular muscles intact oropharynx clear chest reveals diffuse wheezing however no increased work of breathing no respiratory distress abdomen soft nontender nondistended bowel sounds positive no guarding no rebound extremities patient is able ambulate well radial pulses 2+ pedal pulses 2+ no focal deficits patient given albuterol treatment as well as Solu-Medrol in addition given Toradol patient was observed here in the emergency department she improved here reassessed she is clear to auscultation bilaterally no evidence of pneumonia she is able to ambulate up and down the hallway with no worsening symptoms I will discharge follow-up to her PMD in the next 24 hours Patient's symptoms are not consistent with ACS EKG is not consistent with STEMI unlikely PE given patient's physical exam findings

## 2023-01-07 ENCOUNTER — NON-APPOINTMENT (OUTPATIENT)
Age: 63
End: 2023-01-07

## 2023-01-07 ENCOUNTER — EMERGENCY (EMERGENCY)
Facility: HOSPITAL | Age: 63
LOS: 0 days | Discharge: HOME | End: 2023-01-07
Attending: EMERGENCY MEDICINE | Admitting: EMERGENCY MEDICINE
Payer: COMMERCIAL

## 2023-01-07 VITALS
WEIGHT: 149.91 LBS | SYSTOLIC BLOOD PRESSURE: 119 MMHG | OXYGEN SATURATION: 96 % | DIASTOLIC BLOOD PRESSURE: 72 MMHG | TEMPERATURE: 100 F | HEART RATE: 87 BPM | RESPIRATION RATE: 18 BRPM

## 2023-01-07 DIAGNOSIS — Z91.041 RADIOGRAPHIC DYE ALLERGY STATUS: ICD-10-CM

## 2023-01-07 DIAGNOSIS — Z90.10 ACQUIRED ABSENCE OF UNSPECIFIED BREAST AND NIPPLE: ICD-10-CM

## 2023-01-07 DIAGNOSIS — Z85.3 PERSONAL HISTORY OF MALIGNANT NEOPLASM OF BREAST: ICD-10-CM

## 2023-01-07 DIAGNOSIS — E78.5 HYPERLIPIDEMIA, UNSPECIFIED: ICD-10-CM

## 2023-01-07 DIAGNOSIS — J45.909 UNSPECIFIED ASTHMA, UNCOMPLICATED: ICD-10-CM

## 2023-01-07 DIAGNOSIS — M79.10 MYALGIA, UNSPECIFIED SITE: ICD-10-CM

## 2023-01-07 DIAGNOSIS — R50.9 FEVER, UNSPECIFIED: ICD-10-CM

## 2023-01-07 DIAGNOSIS — U07.1 COVID-19: ICD-10-CM

## 2023-01-07 DIAGNOSIS — R05.1 ACUTE COUGH: ICD-10-CM

## 2023-01-07 LAB
FLUAV AG NPH QL: SIGNIFICANT CHANGE UP
FLUBV AG NPH QL: SIGNIFICANT CHANGE UP
RSV RNA NPH QL NAA+NON-PROBE: SIGNIFICANT CHANGE UP
SARS-COV-2 RNA SPEC QL NAA+PROBE: DETECTED

## 2023-01-07 PROCEDURE — 99284 EMERGENCY DEPT VISIT MOD MDM: CPT

## 2023-01-07 RX ORDER — ACETAMINOPHEN 500 MG
975 TABLET ORAL ONCE
Refills: 0 | Status: COMPLETED | OUTPATIENT
Start: 2023-01-07 | End: 2023-01-07

## 2023-01-07 NOTE — ED PROVIDER NOTE - OBJECTIVE STATEMENT
63 yo female with a pmh of hld, asthma, and breast ca s/p double mastectomy presents c/o subjective fever/cough/body aches that started last night. pt states "I think I have COVID". pt denies any other symptoms including atypical headache, recent illness/travel, abdominal pain, chest pain, or SOB.

## 2023-01-07 NOTE — ED PROVIDER NOTE - NS ED ROS FT
Goal Outcome Evaluation:  Plan of Care Reviewed With: patient           Outcome Summary: Patient states that he is feeling better today. Up at maty with no ill effects, meals tolerated. No complaints of pain or shortness of breath, O2 SAT remains WNL on room air.Patient comliant with treatment, monitoring continues.   Except as documented in the HPI, all other systems are negative.

## 2023-01-07 NOTE — ED PROVIDER NOTE - ATTENDING APP SHARED VISIT CONTRIBUTION OF CARE
62-year-old female history of asthma, breast cancer presents with fever and cough since yesterday.  Patient concerned for COVID.  No chest pain or shortness of breath.  On exam patient in NAD, AAOx3, seen ambulate with steady gait, OP clear, MMM, lungs CTA B/L, no edema

## 2023-01-07 NOTE — ED PROVIDER NOTE - PATIENT PORTAL LINK FT
You can access the FollowMyHealth Patient Portal offered by [facility] by registering at the following website: [email or web address]. By joining [employer]’s FollowMyHealth portal, you will also be able to view your health information using other applications (apps) compatible with our system.

## 2023-11-30 ENCOUNTER — INPATIENT (INPATIENT)
Facility: HOSPITAL | Age: 63
LOS: 10 days | Discharge: HOME | DRG: 189 | End: 2023-12-11
Attending: INTERNAL MEDICINE | Admitting: STUDENT IN AN ORGANIZED HEALTH CARE EDUCATION/TRAINING PROGRAM
Payer: COMMERCIAL

## 2023-11-30 VITALS
TEMPERATURE: 98 F | WEIGHT: 149.91 LBS | RESPIRATION RATE: 20 BRPM | HEIGHT: 66 IN | DIASTOLIC BLOOD PRESSURE: 80 MMHG | HEART RATE: 100 BPM | SYSTOLIC BLOOD PRESSURE: 122 MMHG

## 2023-11-30 DIAGNOSIS — J06.9 ACUTE UPPER RESPIRATORY INFECTION, UNSPECIFIED: ICD-10-CM

## 2023-11-30 LAB
ALBUMIN SERPL ELPH-MCNC: 4.9 G/DL — SIGNIFICANT CHANGE UP (ref 3.5–5.2)
ALBUMIN SERPL ELPH-MCNC: 4.9 G/DL — SIGNIFICANT CHANGE UP (ref 3.5–5.2)
ALP SERPL-CCNC: 102 U/L — SIGNIFICANT CHANGE UP (ref 30–115)
ALP SERPL-CCNC: 102 U/L — SIGNIFICANT CHANGE UP (ref 30–115)
ALT FLD-CCNC: 28 U/L — SIGNIFICANT CHANGE UP (ref 0–41)
ALT FLD-CCNC: 28 U/L — SIGNIFICANT CHANGE UP (ref 0–41)
ANION GAP SERPL CALC-SCNC: 13 MMOL/L — SIGNIFICANT CHANGE UP (ref 7–14)
ANION GAP SERPL CALC-SCNC: 13 MMOL/L — SIGNIFICANT CHANGE UP (ref 7–14)
AST SERPL-CCNC: 29 U/L — SIGNIFICANT CHANGE UP (ref 0–41)
AST SERPL-CCNC: 29 U/L — SIGNIFICANT CHANGE UP (ref 0–41)
BASOPHILS # BLD AUTO: 0.03 K/UL — SIGNIFICANT CHANGE UP (ref 0–0.2)
BASOPHILS # BLD AUTO: 0.03 K/UL — SIGNIFICANT CHANGE UP (ref 0–0.2)
BASOPHILS NFR BLD AUTO: 0.3 % — SIGNIFICANT CHANGE UP (ref 0–1)
BASOPHILS NFR BLD AUTO: 0.3 % — SIGNIFICANT CHANGE UP (ref 0–1)
BILIRUB SERPL-MCNC: 0.3 MG/DL — SIGNIFICANT CHANGE UP (ref 0.2–1.2)
BILIRUB SERPL-MCNC: 0.3 MG/DL — SIGNIFICANT CHANGE UP (ref 0.2–1.2)
BUN SERPL-MCNC: 24 MG/DL — HIGH (ref 10–20)
BUN SERPL-MCNC: 24 MG/DL — HIGH (ref 10–20)
CALCIUM SERPL-MCNC: 10 MG/DL — SIGNIFICANT CHANGE UP (ref 8.4–10.4)
CALCIUM SERPL-MCNC: 10 MG/DL — SIGNIFICANT CHANGE UP (ref 8.4–10.4)
CHLORIDE SERPL-SCNC: 99 MMOL/L — SIGNIFICANT CHANGE UP (ref 98–110)
CHLORIDE SERPL-SCNC: 99 MMOL/L — SIGNIFICANT CHANGE UP (ref 98–110)
CO2 SERPL-SCNC: 27 MMOL/L — SIGNIFICANT CHANGE UP (ref 17–32)
CO2 SERPL-SCNC: 27 MMOL/L — SIGNIFICANT CHANGE UP (ref 17–32)
CREAT SERPL-MCNC: 0.7 MG/DL — SIGNIFICANT CHANGE UP (ref 0.7–1.5)
CREAT SERPL-MCNC: 0.7 MG/DL — SIGNIFICANT CHANGE UP (ref 0.7–1.5)
EGFR: 97 ML/MIN/1.73M2 — SIGNIFICANT CHANGE UP
EGFR: 97 ML/MIN/1.73M2 — SIGNIFICANT CHANGE UP
EOSINOPHIL # BLD AUTO: 0 K/UL — SIGNIFICANT CHANGE UP (ref 0–0.7)
EOSINOPHIL # BLD AUTO: 0 K/UL — SIGNIFICANT CHANGE UP (ref 0–0.7)
EOSINOPHIL NFR BLD AUTO: 0 % — SIGNIFICANT CHANGE UP (ref 0–8)
EOSINOPHIL NFR BLD AUTO: 0 % — SIGNIFICANT CHANGE UP (ref 0–8)
FLUAV AG NPH QL: SIGNIFICANT CHANGE UP
FLUAV AG NPH QL: SIGNIFICANT CHANGE UP
FLUBV AG NPH QL: SIGNIFICANT CHANGE UP
FLUBV AG NPH QL: SIGNIFICANT CHANGE UP
GAS PNL BLDV: SIGNIFICANT CHANGE UP
GAS PNL BLDV: SIGNIFICANT CHANGE UP
GLUCOSE SERPL-MCNC: 112 MG/DL — HIGH (ref 70–99)
GLUCOSE SERPL-MCNC: 112 MG/DL — HIGH (ref 70–99)
HCT VFR BLD CALC: 40.7 % — SIGNIFICANT CHANGE UP (ref 37–47)
HCT VFR BLD CALC: 40.7 % — SIGNIFICANT CHANGE UP (ref 37–47)
HGB BLD-MCNC: 13.6 G/DL — SIGNIFICANT CHANGE UP (ref 12–16)
HGB BLD-MCNC: 13.6 G/DL — SIGNIFICANT CHANGE UP (ref 12–16)
IMM GRANULOCYTES NFR BLD AUTO: 0.7 % — HIGH (ref 0.1–0.3)
IMM GRANULOCYTES NFR BLD AUTO: 0.7 % — HIGH (ref 0.1–0.3)
LYMPHOCYTES # BLD AUTO: 1.33 K/UL — SIGNIFICANT CHANGE UP (ref 1.2–3.4)
LYMPHOCYTES # BLD AUTO: 1.33 K/UL — SIGNIFICANT CHANGE UP (ref 1.2–3.4)
LYMPHOCYTES # BLD AUTO: 11.8 % — LOW (ref 20.5–51.1)
LYMPHOCYTES # BLD AUTO: 11.8 % — LOW (ref 20.5–51.1)
MAGNESIUM SERPL-MCNC: 2.2 MG/DL — SIGNIFICANT CHANGE UP (ref 1.8–2.4)
MAGNESIUM SERPL-MCNC: 2.2 MG/DL — SIGNIFICANT CHANGE UP (ref 1.8–2.4)
MCHC RBC-ENTMCNC: 29.3 PG — SIGNIFICANT CHANGE UP (ref 27–31)
MCHC RBC-ENTMCNC: 29.3 PG — SIGNIFICANT CHANGE UP (ref 27–31)
MCHC RBC-ENTMCNC: 33.4 G/DL — SIGNIFICANT CHANGE UP (ref 32–37)
MCHC RBC-ENTMCNC: 33.4 G/DL — SIGNIFICANT CHANGE UP (ref 32–37)
MCV RBC AUTO: 87.7 FL — SIGNIFICANT CHANGE UP (ref 81–99)
MCV RBC AUTO: 87.7 FL — SIGNIFICANT CHANGE UP (ref 81–99)
MONOCYTES # BLD AUTO: 0.91 K/UL — HIGH (ref 0.1–0.6)
MONOCYTES # BLD AUTO: 0.91 K/UL — HIGH (ref 0.1–0.6)
MONOCYTES NFR BLD AUTO: 8.1 % — SIGNIFICANT CHANGE UP (ref 1.7–9.3)
MONOCYTES NFR BLD AUTO: 8.1 % — SIGNIFICANT CHANGE UP (ref 1.7–9.3)
NEUTROPHILS # BLD AUTO: 8.88 K/UL — HIGH (ref 1.4–6.5)
NEUTROPHILS # BLD AUTO: 8.88 K/UL — HIGH (ref 1.4–6.5)
NEUTROPHILS NFR BLD AUTO: 79.1 % — HIGH (ref 42.2–75.2)
NEUTROPHILS NFR BLD AUTO: 79.1 % — HIGH (ref 42.2–75.2)
NRBC # BLD: 0 /100 WBCS — SIGNIFICANT CHANGE UP (ref 0–0)
NRBC # BLD: 0 /100 WBCS — SIGNIFICANT CHANGE UP (ref 0–0)
NT-PROBNP SERPL-SCNC: 351 PG/ML — HIGH (ref 0–300)
NT-PROBNP SERPL-SCNC: 351 PG/ML — HIGH (ref 0–300)
PLATELET # BLD AUTO: 372 K/UL — SIGNIFICANT CHANGE UP (ref 130–400)
PLATELET # BLD AUTO: 372 K/UL — SIGNIFICANT CHANGE UP (ref 130–400)
PMV BLD: 9.4 FL — SIGNIFICANT CHANGE UP (ref 7.4–10.4)
PMV BLD: 9.4 FL — SIGNIFICANT CHANGE UP (ref 7.4–10.4)
POTASSIUM SERPL-MCNC: 4.4 MMOL/L — SIGNIFICANT CHANGE UP (ref 3.5–5)
POTASSIUM SERPL-MCNC: 4.4 MMOL/L — SIGNIFICANT CHANGE UP (ref 3.5–5)
POTASSIUM SERPL-SCNC: 4.4 MMOL/L — SIGNIFICANT CHANGE UP (ref 3.5–5)
POTASSIUM SERPL-SCNC: 4.4 MMOL/L — SIGNIFICANT CHANGE UP (ref 3.5–5)
PROT SERPL-MCNC: 7.7 G/DL — SIGNIFICANT CHANGE UP (ref 6–8)
PROT SERPL-MCNC: 7.7 G/DL — SIGNIFICANT CHANGE UP (ref 6–8)
RBC # BLD: 4.64 M/UL — SIGNIFICANT CHANGE UP (ref 4.2–5.4)
RBC # BLD: 4.64 M/UL — SIGNIFICANT CHANGE UP (ref 4.2–5.4)
RBC # FLD: 14.9 % — HIGH (ref 11.5–14.5)
RBC # FLD: 14.9 % — HIGH (ref 11.5–14.5)
RSV RNA NPH QL NAA+NON-PROBE: DETECTED
RSV RNA NPH QL NAA+NON-PROBE: DETECTED
SARS-COV-2 RNA SPEC QL NAA+PROBE: SIGNIFICANT CHANGE UP
SARS-COV-2 RNA SPEC QL NAA+PROBE: SIGNIFICANT CHANGE UP
SODIUM SERPL-SCNC: 139 MMOL/L — SIGNIFICANT CHANGE UP (ref 135–146)
SODIUM SERPL-SCNC: 139 MMOL/L — SIGNIFICANT CHANGE UP (ref 135–146)
TROPONIN T SERPL-MCNC: <0.01 NG/ML — SIGNIFICANT CHANGE UP
TROPONIN T SERPL-MCNC: <0.01 NG/ML — SIGNIFICANT CHANGE UP
WBC # BLD: 11.23 K/UL — HIGH (ref 4.8–10.8)
WBC # BLD: 11.23 K/UL — HIGH (ref 4.8–10.8)
WBC # FLD AUTO: 11.23 K/UL — HIGH (ref 4.8–10.8)
WBC # FLD AUTO: 11.23 K/UL — HIGH (ref 4.8–10.8)

## 2023-11-30 PROCEDURE — 97162 PT EVAL MOD COMPLEX 30 MIN: CPT | Mod: GP

## 2023-11-30 PROCEDURE — 99285 EMERGENCY DEPT VISIT HI MDM: CPT

## 2023-11-30 PROCEDURE — 81001 URINALYSIS AUTO W/SCOPE: CPT

## 2023-11-30 PROCEDURE — 80048 BASIC METABOLIC PNL TOTAL CA: CPT

## 2023-11-30 PROCEDURE — 83605 ASSAY OF LACTIC ACID: CPT

## 2023-11-30 PROCEDURE — 71045 X-RAY EXAM CHEST 1 VIEW: CPT | Mod: 26

## 2023-11-30 PROCEDURE — 85018 HEMOGLOBIN: CPT

## 2023-11-30 PROCEDURE — 87086 URINE CULTURE/COLONY COUNT: CPT

## 2023-11-30 PROCEDURE — 71045 X-RAY EXAM CHEST 1 VIEW: CPT

## 2023-11-30 PROCEDURE — 84295 ASSAY OF SERUM SODIUM: CPT

## 2023-11-30 PROCEDURE — 86803 HEPATITIS C AB TEST: CPT

## 2023-11-30 PROCEDURE — 82962 GLUCOSE BLOOD TEST: CPT

## 2023-11-30 PROCEDURE — 85379 FIBRIN DEGRADATION QUANT: CPT

## 2023-11-30 PROCEDURE — 93010 ELECTROCARDIOGRAM REPORT: CPT

## 2023-11-30 PROCEDURE — 80053 COMPREHEN METABOLIC PANEL: CPT

## 2023-11-30 PROCEDURE — 82803 BLOOD GASES ANY COMBINATION: CPT

## 2023-11-30 PROCEDURE — 94640 AIRWAY INHALATION TREATMENT: CPT

## 2023-11-30 PROCEDURE — 36415 COLL VENOUS BLD VENIPUNCTURE: CPT

## 2023-11-30 PROCEDURE — C9113: CPT

## 2023-11-30 PROCEDURE — 85025 COMPLETE CBC W/AUTO DIFF WBC: CPT

## 2023-11-30 PROCEDURE — 84484 ASSAY OF TROPONIN QUANT: CPT

## 2023-11-30 PROCEDURE — 94660 CPAP INITIATION&MGMT: CPT

## 2023-11-30 PROCEDURE — 85014 HEMATOCRIT: CPT

## 2023-11-30 PROCEDURE — 83735 ASSAY OF MAGNESIUM: CPT

## 2023-11-30 PROCEDURE — 82330 ASSAY OF CALCIUM: CPT

## 2023-11-30 PROCEDURE — 93307 TTE W/O DOPPLER COMPLETE: CPT

## 2023-11-30 PROCEDURE — 84145 PROCALCITONIN (PCT): CPT

## 2023-11-30 PROCEDURE — 84132 ASSAY OF SERUM POTASSIUM: CPT

## 2023-11-30 RX ORDER — IPRATROPIUM/ALBUTEROL SULFATE 18-103MCG
3 AEROSOL WITH ADAPTER (GRAM) INHALATION EVERY 4 HOURS
Refills: 0 | Status: DISCONTINUED | OUTPATIENT
Start: 2023-11-30 | End: 2023-12-01

## 2023-11-30 RX ORDER — ENOXAPARIN SODIUM 100 MG/ML
40 INJECTION SUBCUTANEOUS EVERY 24 HOURS
Refills: 0 | Status: DISCONTINUED | OUTPATIENT
Start: 2023-11-30 | End: 2023-12-11

## 2023-11-30 RX ORDER — IPRATROPIUM/ALBUTEROL SULFATE 18-103MCG
3 AEROSOL WITH ADAPTER (GRAM) INHALATION ONCE
Refills: 0 | Status: COMPLETED | OUTPATIENT
Start: 2023-11-30 | End: 2023-11-30

## 2023-11-30 RX ORDER — MAGNESIUM SULFATE 500 MG/ML
2 VIAL (ML) INJECTION ONCE
Refills: 0 | Status: COMPLETED | OUTPATIENT
Start: 2023-11-30 | End: 2023-11-30

## 2023-11-30 RX ORDER — LANOLIN ALCOHOL/MO/W.PET/CERES
5 CREAM (GRAM) TOPICAL ONCE
Refills: 0 | Status: COMPLETED | OUTPATIENT
Start: 2023-11-30 | End: 2023-11-30

## 2023-11-30 RX ORDER — ACETAMINOPHEN 500 MG
650 TABLET ORAL EVERY 6 HOURS
Refills: 0 | Status: DISCONTINUED | OUTPATIENT
Start: 2023-11-30 | End: 2023-12-01

## 2023-11-30 RX ORDER — AZITHROMYCIN 500 MG/1
500 TABLET, FILM COATED ORAL ONCE
Refills: 0 | Status: COMPLETED | OUTPATIENT
Start: 2023-11-30 | End: 2023-11-30

## 2023-11-30 RX ORDER — SODIUM CHLORIDE 9 MG/ML
2040 INJECTION, SOLUTION INTRAVENOUS ONCE
Refills: 0 | Status: COMPLETED | OUTPATIENT
Start: 2023-11-30 | End: 2023-11-30

## 2023-11-30 RX ORDER — CEFTRIAXONE 500 MG/1
1000 INJECTION, POWDER, FOR SOLUTION INTRAMUSCULAR; INTRAVENOUS ONCE
Refills: 0 | Status: COMPLETED | OUTPATIENT
Start: 2023-11-30 | End: 2023-11-30

## 2023-11-30 RX ADMIN — AZITHROMYCIN 255 MILLIGRAM(S): 500 TABLET, FILM COATED ORAL at 19:10

## 2023-11-30 RX ADMIN — Medication 3 MILLILITER(S): at 18:12

## 2023-11-30 RX ADMIN — SODIUM CHLORIDE 2040 MILLILITER(S): 9 INJECTION, SOLUTION INTRAVENOUS at 19:11

## 2023-11-30 RX ADMIN — CEFTRIAXONE 100 MILLIGRAM(S): 500 INJECTION, POWDER, FOR SOLUTION INTRAMUSCULAR; INTRAVENOUS at 18:59

## 2023-11-30 RX ADMIN — Medication 125 MILLIGRAM(S): at 18:58

## 2023-11-30 NOTE — ED PROVIDER NOTE - CLINICAL SUMMARY MEDICAL DECISION MAKING FREE TEXT BOX
pt with asthma/copd, previously intubated, here with cough, wheezing, difficulty breathing.  pt found to be hypoxic in ED and increased WOB, will try on bipap and activate code sepsis due to fever and presumed infection.  Any ordered labs and EKG were reviewed.  Any imaging was ordered and reviewed by me.  Appropriate medications for patient's presenting complaints were ordered and effects were reassessed.  Patient's records (prior hospital, ED visit, and/or nursing home notes if available) were reviewed.  Additional history was obtained from EMS, family, and/or PCP (where available).  Escalation to admission/observation was considered.  Patient requires inpatient hospitalization - monitored setting.

## 2023-11-30 NOTE — H&P ADULT - ATTENDING COMMENTS
events noted, asthma exacerbation/ acute hypoxemic resp failure / RSV+, on NIV, steroids, Neb, D Dimer, PF, MICU events noted, asthma exacerbation/ acute hypoxemic resp failure / RSV+, on NIV, steroids, Neb, D Dimer, PF, LR 75 cc/h. MICU

## 2023-11-30 NOTE — ED PROVIDER NOTE - OBJECTIVE STATEMENT
Patient is 63y Female PMH HLD, breast cancer in remission 2016, and severe asthma managed by Dr. Peña/ hx of intubation, who presents to the ED with complaints of SOB, chest pressure, sore throat, nasal congestion, and green productive cough that began two days ago and has since worsened. Patient reports she has done 3 albuterol nebs today with no relief. Reports decreased appetite as of today. Denies fever, chills, nausea, vomiting, abdominal pain, calf pain/swelling, hx of DVT/PE, sick contacts or recent travel. Patient is 63y Female PMH HLD, breast cancer in remission 2016, and severe asthma managed by Dr. Peña/ reports hx of intubation for asthma exacberation, who presents to the ED with complaints of SOB, chest pressure, sore throat, nasal congestion, and green productive cough that began two days ago and has since worsened. Patient reports she has done 3 albuterol nebs today with no relief. Reports decreased appetite as of today. Denies fever, chills, nausea, vomiting, abdominal pain, calf pain/swelling, hemoptysis, hx of DVT/PE, sick contacts or recent travel. Patient is 63y Female PMH HLD, breast cancer in remission 2018, and severe asthma managed by Dr. Peña/ reports hx of intubation for asthma exacberation, who presents to the ED with complaints of SOB, chest pressure, sore throat, nasal congestion, and green productive cough that began two days ago and has since worsened. Patient reports she has done 3 albuterol nebs today with no relief. Reports decreased appetite as of today. Denies fever, chills, nausea, vomiting, abdominal pain, calf pain/swelling, hemoptysis, hx of DVT/PE, sick contacts or recent travel.

## 2023-11-30 NOTE — ED PROVIDER NOTE - PHYSICAL EXAMINATION
VITAL SIGNS: I have reviewed nursing notes and confirm.  CONSTITUTIONAL: In no acute distress.  SKIN: Skin exam is warm and dry.  HEAD: Normocephalic; atraumatic.  EYES: PERRL, conjunctiva and sclera clear.  ENT: No nasal discharge; airway clear. TMs clear.  NECK: Supple; non tender.  CARD: S1, S2; Regular rate and rhythm.  RESP: Rhonchi bilaterally. Actively coughing, not speaking in full sentences.   ABD: Soft; non-distended; non-tender; No rebound or guarding.  EXT: Normal ROM. No edema. No calf swelling or pain.   NEURO: Alert, oriented. VITAL SIGNS: I have reviewed nursing notes and confirm.  CONSTITUTIONAL: In no acute distress.  SKIN: Skin exam is warm and dry.  HEAD: Normocephalic; atraumatic.  EYES: PERRL, conjunctiva and sclera clear.  ENT: No nasal discharge; airway clear. TMs clear. Posterior oropharynx erythematous without exudates.  NECK: Supple; non tender.  CARD: S1, S2; Regular rate and rhythm.  RESP: Rhonchi bilaterally. Actively coughing, not speaking in full sentences.   ABD: Soft; non-distended; non-tender; No rebound or guarding.  EXT: Normal ROM. No edema. No calf swelling or pain.   NEURO: Alert, oriented.

## 2023-11-30 NOTE — H&P ADULT - HISTORY OF PRESENT ILLNESS
Case of 63-year-old Female with PMHx of HLD, breast cancer in remission 2016, and severe asthma managed by Dr. Peña with hx of intubation for asthma exacberation, who presents to the ED with complaints of SOB.    SOB is of two days duration, has been progressively worsening and associated with chest pressure, sore throat, nasal congestion, and green productive cough that began two days ago and has since worsened.    Patient reports she has done 3 albuterol nebs today with no relief.     Denies fever, chills, nausea, vomiting, abdominal pain, calf pain/swelling, hemoptysis, hx of DVT/PE, sick contacts or recent travel.    In the ED, vitals were stable  Labs wbc 11k and RSV positive    CXR no focal infiltrates (fu official result)    Given rocephin and azithro, solumedrol, duonebs Case of 63-year-old Female with PMHx of HLD, breast cancer in remission 2016, and severe asthma managed by Dr. Peña with hx of intubation for asthma exacberation, who presents to the ED with complaints of SOB.    SOB is of 3 days duration, has been progressively worsening and associated with chest pressure, sore throat, nasal congestion, and green productive cough that began two days ago and has since worsened.    Patient reports she has done 3 albuterol nebs and started on  PO streoids with no relief.     Denies fever, nausea, vomiting, abdominal pain, calf pain/swelling, hemoptysis, hx of DVT/PE, sick contacts or recent travel.    In the ED, vitals were stable  Labs wbc 11k and RSV positive    CXR no focal infiltrates (fu official result)    Given rocephin and azithro, solumedrol, duonebs Case of 63-year-old Female with PMHx of HLD, breast cancer in remission 2016, and severe asthma managed by Dr. Peña with hx of intubation for asthma exacberation, who presents to the ED with complaints of SOB.    SOB is of 3 days duration, has been progressively worsening and associated with chest pressure, sore throat, nasal congestion, and green productive cough that began two days ago and has since worsened.    Patient reports she has done 3 albuterol nebs and started on  PO streoids with no relief.     Denies fever, nausea, vomiting, abdominal pain, calf pain/swelling, hemoptysis, hx of DVT/PE, sick contacts or recent travel.    In the ED, vitals were stable  Labs wbc 11k and RSV positive    CXR no focal infiltrates (fu official result)    Given rocephin and azithro, solumedrol, duonebs and started on bipap Case of 63-year-old Female with PMHx of HLD, breast cancer in remission 2016, and severe asthma managed by Dr. Peña with hx of intubation for asthma exacerbation who presents to the ED with complaints of SOB.    SOB is of 3 days duration, has been progressively worsening and associated with chest pressure, sore throat, nasal congestion, and green productive cough that began two days ago and has since worsened.    Patient reports she has done 3 albuterol nebs and started on  PO streoids with no relief.     Denies fever, nausea, vomiting, abdominal pain, calf pain/swelling, hemoptysis, hx of DVT/PE, sick contacts or recent travel.    In the ED, vitals were stable  Labs wbc 11k and RSV positive    CXR no focal infiltrates (fu official result)    Given rocephin and azithro, solumedrol, duonebs and started on bipap

## 2023-11-30 NOTE — H&P ADULT - NSHPPHYSICALEXAM_GEN_ALL_CORE
PHYSICAL EXAM:  CONSTITUTIONAL: No acute distress, well-developed, well-groomed, AAOx3  HEAD: Atraumatic, normocephalic  EYES: EOM intact, PERRLA, conjunctiva and sclera clear  ENT: Supple, no masses, no thyromegaly, no bruits, no JVD; moist mucous membranes  PULMONARY: Clear to auscultation bilaterally; no wheezes, rales, or rhonchi  CARDIOVASCULAR: Regular rate and rhythm; no murmurs, rubs, or gallops  GASTROINTESTINAL: Soft, non-tender, non-distended; bowel sounds present  MUSCULOSKELETAL: 2+ peripheral pulses; no clubbing, no cyanosis, no edema  NEUROLOGY: non-focal  SKIN: No rashes or lesions; warm and dry PHYSICAL EXAM:  CONSTITUTIONAL: severe distress, AAOx3  PULMONARY: severely decreased air entry  CARDIOVASCULAR: Regular rate and rhythm; no murmurs, rubs, or gallops  GASTROINTESTINAL: Soft, non-tender, non-distended; bowel sounds present  MUSCULOSKELETAL: 2+ peripheral pulses; no LLE  NEUROLOGY: non-focal  SKIN: No rashes or lesions; warm and dry

## 2023-11-30 NOTE — H&P ADULT - NSHPLABSRESULTS_GEN_ALL_CORE
VITAL SIGNS: Last 24 Hours  T(C): 36.7 (30 Nov 2023 22:17), Max: 36.8 (30 Nov 2023 16:24)  T(F): 98.1 (30 Nov 2023 22:17), Max: 98.2 (30 Nov 2023 16:24)  HR: 92 (30 Nov 2023 22:17) (92 - 100)  BP: 132/78 (30 Nov 2023 22:17) (122/80 - 132/78)  BP(mean): --  RR: 22 (30 Nov 2023 22:17) (20 - 22)  SpO2: 99% (30 Nov 2023 22:17) (96% - 99%)    LABS:                        13.6   11.23 )-----------( 372      ( 30 Nov 2023 17:10 )             40.7     11-30    139  |  99  |  24<H>  ----------------------------<  112<H>  4.4   |  27  |  0.7    Ca    10.0      30 Nov 2023 17:10  Mg     2.2     11-30    TPro  7.7  /  Alb  4.9  /  TBili  0.3  /  DBili  x   /  AST  29  /  ALT  28  /  AlkPhos  102  11-30      Urinalysis Basic - ( 30 Nov 2023 17:10 )    Color: x / Appearance: x / SG: x / pH: x  Gluc: 112 mg/dL / Ketone: x  / Bili: x / Urobili: x   Blood: x / Protein: x / Nitrite: x   Leuk Esterase: x / RBC: x / WBC x   Sq Epi: x / Non Sq Epi: x / Bacteria: x        Troponin T, Serum: <0.01 ng/mL (11-30-23 @ 17:10)      CARDIAC MARKERS ( 30 Nov 2023 17:10 )  x     / <0.01 ng/mL / x     / x     / x          RADIOLOGY:

## 2023-11-30 NOTE — H&P ADULT - ASSESSMENT
63-year-old Female with PMHx of HLD, breast cancer in remission 2016, and severe asthma managed by Dr. Peña with hx of intubation for asthma exacberation, who presents to the ED with complaints of SOB.    #Acute asthma exacerbation due to RSV infection  #Hx of intubation  #Mild leukocytosis  - CXR no infiltrates  - Wheezing on exam  Plan  - Solumedrol 60 Q8H and titrate as needed  - Duonebs  - Peak flow measurement   - Mg x1      #DLD    #Breast cancer      #DVT prophylaxis  #GI prophylaxis  #Activity  #Diet               63-year-old Female with PMHx of HLD, breast cancer in remission 2016, and severe asthma managed by Dr. Peña with hx of intubation for asthma exacberation, who presents to the ED with complaints of SOB.    #Acute severe asthma exacerbation due to RSV infection  #Hx of intubation  #Mild leukocytosis  - CXR no infiltrates  - Almost absent air entry on exam  - Started on bipap  Plan  - Get ABG  - Solumedrol 60 Q8H and titrate as needed  - Duonebs Q1H  - Mg x1  - Low threshold for intubation  - Peak flow measurement       #DLD  #Breast cancer  - Hold PO meds while on bipap      #DVT prophylaxis: lovenox 40mg  #GI prophylaxis: not needed  #Activity: IAT  #Diet: NPO for now               63-year-old Female with PMHx of HLD, breast cancer in remission 2016, and severe asthma managed by Dr. Peña with hx of intubation for asthma exacberation, who presents to the ED with complaints of SOB.    **patient unable to provide med-rec while on bipap, she knows her meds but unable to speak, please recheck with her when off bipap**    #Acute severe asthma exacerbation due to RSV infection  #Hx of intubation  #Mild leukocytosis  - CXR no infiltrates  - Almost absent air entry on exam  - Started on bipap  - ABG grossly unremarkable   Plan  - Spoke to crit fellow, will upgrade to stepdown for now  - Solumedrol 60 Q8H and titrate as needed  - Duonebs Q1H  - Mg x1  - Repeat ABG in one hour  - Low threshold for intubation  - Peak flow measurement later      #DLD  #Breast cancer  - Hold PO meds while on bipap      #DVT prophylaxis: lovenox 40mg  #GI prophylaxis: not needed  #Activity: IAT  #Diet: NPO for now               63-year-old Female with PMHx of HLD, breast cancer in remission 2016, and severe asthma managed by Dr. Peña with hx of intubation for asthma exacberation, who presents to the ED with complaints of SOB.    **patient unable to provide med-rec while on bipap, she knows her meds but unable to speak, please recheck with her when off bipap**    #Acute severe asthma exacerbation due to RSV infection  #Hx of intubation  #Mild leukocytosis  - CXR no infiltrates  - Almost absent air entry on exam, clinically not improving  - Started on bipap, was desatting to 80s while off bipap and on NC   - ABG grossly unremarkable   Plan  - Spoke to crit fellow, will upgrade to icu for closer monitoring  - Solumedrol 60 Q8H  - Duonebs Q1H  - Mg x1 given  - Repeat ABG in one hour  - Low threshold for intubation  - Peak flow measurement later      #DLD  #Breast cancer  - Hold PO meds while on bipap      #DVT prophylaxis: lovenox 40mg  #GI prophylaxis: not needed  #Activity: IAT  #Diet: NPO for now               63-year-old Female with PMHx of HLD, breast cancer in remission 2016, and severe asthma managed by Dr. Peña with hx of intubation for asthma exacberation, who presents to the ED with complaints of SOB.    **patient unable to provide med-rec while on bipap, she knows her meds but unable to speak, please recheck with her when off bipap**    #Acute severe asthma exacerbation due to RSV infection  #Hx of intubation  #Mild leukocytosis  - CXR no infiltrates  - Almost absent air entry on exam, clinically not improving  - Started on bipap, was desatting to 80s while off bipap and on NC   - ABG grossly unremarkable   Plan  - Spoke to crit fellow, will upgrade to icu for closer monitoring  - Solumedrol 60 Q8H  - Duonebs Q1H  - Mg x1 given  - Repeat ABG in one hour  - Low threshold for intubation  - Peak flow measurement later  - D-dimer  - Monitor off Abx       #DLD  #Breast cancer  - Hold PO meds while on bipap      #DVT prophylaxis: lovenox 40mg  #GI prophylaxis: not needed  #Activity: IAT  #Diet: NPO for now               63-year-old Female with PMHx of HLD, breast cancer in remission 2016, and severe asthma managed by Dr. Peña with hx of intubation for asthma exacberation, who presents to the ED with complaints of SOB.    **patient unable to provide med-rec while on bipap, she knows her meds but unable to speak, please recheck with her when off bipap**    #Acute severe asthma exacerbation due to RSV infection  #Hx of intubation  #Mild leukocytosis  - CXR no infiltrates  - Almost absent air entry on exam, clinically not improving  - Started on bipap, was desatting to 80s while off bipap and on NC   - ABG grossly unremarkable   Plan  - Spoke to crit fellow, will upgrade to icu for closer monitoring  - Solumedrol 60 Q8H  - Duonebs Q1H  - Mg x1 given  - Repeat ABG in one hour  - Low threshold for intubation  - Peak flow measurement later  - D-dimer        #DLD  #Breast cancer  - Hold PO meds while on bipap      #DVT prophylaxis: lovenox 40mg  #GI prophylaxis: not needed  #Activity: IAT  #Diet: NPO for now

## 2023-11-30 NOTE — ED PROVIDER NOTE - ATTENDING APP SHARED VISIT CONTRIBUTION OF CARE
64 yo f with pmh of breast ca in remission, hld, asthma/copd, previous intubation, presents with 3 days of wosening cough, sob and wheezing.  pt f/u with pulm dr. de la cruz.  pt has been using her neb at home with minimal relief.  increasing diff breathing today.  no leg pain or swelling.  pt says she feels like she has a fever.  exam: nad, ncat, perrl, eomi, mmm, rrr, b/l wheezing and dec bs, abd soft, nt, nd aox3, no calf tenderness, no pitting edema imp: pt with asthma/copd, previously intubated, here with cough, wheezing, difficulty breathing.  pt found to be hypoxic in ED and increased WOB, will try on bipap and activate code sepsis due to fever and presumed infection

## 2023-12-01 LAB
ANION GAP SERPL CALC-SCNC: 14 MMOL/L — SIGNIFICANT CHANGE UP (ref 7–14)
ANION GAP SERPL CALC-SCNC: 14 MMOL/L — SIGNIFICANT CHANGE UP (ref 7–14)
BASE EXCESS BLDA CALC-SCNC: 4 MMOL/L — HIGH (ref -2–3)
BASE EXCESS BLDA CALC-SCNC: 4 MMOL/L — HIGH (ref -2–3)
BASOPHILS # BLD AUTO: 0.02 K/UL — SIGNIFICANT CHANGE UP (ref 0–0.2)
BASOPHILS # BLD AUTO: 0.02 K/UL — SIGNIFICANT CHANGE UP (ref 0–0.2)
BASOPHILS NFR BLD AUTO: 0.2 % — SIGNIFICANT CHANGE UP (ref 0–1)
BASOPHILS NFR BLD AUTO: 0.2 % — SIGNIFICANT CHANGE UP (ref 0–1)
BUN SERPL-MCNC: 20 MG/DL — SIGNIFICANT CHANGE UP (ref 10–20)
BUN SERPL-MCNC: 20 MG/DL — SIGNIFICANT CHANGE UP (ref 10–20)
CALCIUM SERPL-MCNC: 9.1 MG/DL — SIGNIFICANT CHANGE UP (ref 8.4–10.5)
CALCIUM SERPL-MCNC: 9.1 MG/DL — SIGNIFICANT CHANGE UP (ref 8.4–10.5)
CHLORIDE SERPL-SCNC: 98 MMOL/L — SIGNIFICANT CHANGE UP (ref 98–110)
CHLORIDE SERPL-SCNC: 98 MMOL/L — SIGNIFICANT CHANGE UP (ref 98–110)
CO2 SERPL-SCNC: 26 MMOL/L — SIGNIFICANT CHANGE UP (ref 17–32)
CO2 SERPL-SCNC: 26 MMOL/L — SIGNIFICANT CHANGE UP (ref 17–32)
CREAT SERPL-MCNC: 0.6 MG/DL — LOW (ref 0.7–1.5)
CREAT SERPL-MCNC: 0.6 MG/DL — LOW (ref 0.7–1.5)
D DIMER BLD IA.RAPID-MCNC: <150 NG/ML DDU — SIGNIFICANT CHANGE UP
D DIMER BLD IA.RAPID-MCNC: <150 NG/ML DDU — SIGNIFICANT CHANGE UP
EGFR: 100 ML/MIN/1.73M2 — SIGNIFICANT CHANGE UP
EGFR: 100 ML/MIN/1.73M2 — SIGNIFICANT CHANGE UP
EOSINOPHIL # BLD AUTO: 0 K/UL — SIGNIFICANT CHANGE UP (ref 0–0.7)
EOSINOPHIL # BLD AUTO: 0 K/UL — SIGNIFICANT CHANGE UP (ref 0–0.7)
EOSINOPHIL NFR BLD AUTO: 0 % — SIGNIFICANT CHANGE UP (ref 0–8)
EOSINOPHIL NFR BLD AUTO: 0 % — SIGNIFICANT CHANGE UP (ref 0–8)
GAS PNL BLDA: SIGNIFICANT CHANGE UP
GLUCOSE SERPL-MCNC: 140 MG/DL — HIGH (ref 70–99)
GLUCOSE SERPL-MCNC: 140 MG/DL — HIGH (ref 70–99)
HCO3 BLDA-SCNC: 29 MMOL/L — HIGH (ref 21–28)
HCO3 BLDA-SCNC: 29 MMOL/L — HIGH (ref 21–28)
HCT VFR BLD CALC: 42.1 % — SIGNIFICANT CHANGE UP (ref 37–47)
HCT VFR BLD CALC: 42.1 % — SIGNIFICANT CHANGE UP (ref 37–47)
HCV AB S/CO SERPL IA: 0.03 COI — SIGNIFICANT CHANGE UP
HCV AB S/CO SERPL IA: 0.03 COI — SIGNIFICANT CHANGE UP
HCV AB SERPL-IMP: SIGNIFICANT CHANGE UP
HCV AB SERPL-IMP: SIGNIFICANT CHANGE UP
HGB BLD-MCNC: 14 G/DL — SIGNIFICANT CHANGE UP (ref 12–16)
HGB BLD-MCNC: 14 G/DL — SIGNIFICANT CHANGE UP (ref 12–16)
HOROWITZ INDEX BLDA+IHG-RTO: 40 — SIGNIFICANT CHANGE UP
HOROWITZ INDEX BLDA+IHG-RTO: 40 — SIGNIFICANT CHANGE UP
IMM GRANULOCYTES NFR BLD AUTO: 0.6 % — HIGH (ref 0.1–0.3)
IMM GRANULOCYTES NFR BLD AUTO: 0.6 % — HIGH (ref 0.1–0.3)
LYMPHOCYTES # BLD AUTO: 0.8 K/UL — LOW (ref 1.2–3.4)
LYMPHOCYTES # BLD AUTO: 0.8 K/UL — LOW (ref 1.2–3.4)
LYMPHOCYTES # BLD AUTO: 7.2 % — LOW (ref 20.5–51.1)
LYMPHOCYTES # BLD AUTO: 7.2 % — LOW (ref 20.5–51.1)
MAGNESIUM SERPL-MCNC: 2.7 MG/DL — HIGH (ref 1.8–2.4)
MAGNESIUM SERPL-MCNC: 2.7 MG/DL — HIGH (ref 1.8–2.4)
MCHC RBC-ENTMCNC: 29.7 PG — SIGNIFICANT CHANGE UP (ref 27–31)
MCHC RBC-ENTMCNC: 29.7 PG — SIGNIFICANT CHANGE UP (ref 27–31)
MCHC RBC-ENTMCNC: 33.3 G/DL — SIGNIFICANT CHANGE UP (ref 32–37)
MCHC RBC-ENTMCNC: 33.3 G/DL — SIGNIFICANT CHANGE UP (ref 32–37)
MCV RBC AUTO: 89.2 FL — SIGNIFICANT CHANGE UP (ref 81–99)
MCV RBC AUTO: 89.2 FL — SIGNIFICANT CHANGE UP (ref 81–99)
MONOCYTES # BLD AUTO: 0.45 K/UL — SIGNIFICANT CHANGE UP (ref 0.1–0.6)
MONOCYTES # BLD AUTO: 0.45 K/UL — SIGNIFICANT CHANGE UP (ref 0.1–0.6)
MONOCYTES NFR BLD AUTO: 4.1 % — SIGNIFICANT CHANGE UP (ref 1.7–9.3)
MONOCYTES NFR BLD AUTO: 4.1 % — SIGNIFICANT CHANGE UP (ref 1.7–9.3)
NEUTROPHILS # BLD AUTO: 9.71 K/UL — HIGH (ref 1.4–6.5)
NEUTROPHILS # BLD AUTO: 9.71 K/UL — HIGH (ref 1.4–6.5)
NEUTROPHILS NFR BLD AUTO: 87.9 % — HIGH (ref 42.2–75.2)
NEUTROPHILS NFR BLD AUTO: 87.9 % — HIGH (ref 42.2–75.2)
NRBC # BLD: 0 /100 WBCS — SIGNIFICANT CHANGE UP (ref 0–0)
NRBC # BLD: 0 /100 WBCS — SIGNIFICANT CHANGE UP (ref 0–0)
PCO2 BLDA: 45 MMHG — SIGNIFICANT CHANGE UP (ref 25–48)
PCO2 BLDA: 45 MMHG — SIGNIFICANT CHANGE UP (ref 25–48)
PH BLDA: 7.42 — SIGNIFICANT CHANGE UP (ref 7.35–7.45)
PH BLDA: 7.42 — SIGNIFICANT CHANGE UP (ref 7.35–7.45)
PLATELET # BLD AUTO: 320 K/UL — SIGNIFICANT CHANGE UP (ref 130–400)
PLATELET # BLD AUTO: 320 K/UL — SIGNIFICANT CHANGE UP (ref 130–400)
PMV BLD: 9.4 FL — SIGNIFICANT CHANGE UP (ref 7.4–10.4)
PMV BLD: 9.4 FL — SIGNIFICANT CHANGE UP (ref 7.4–10.4)
PO2 BLDA: 122 MMHG — HIGH (ref 83–108)
PO2 BLDA: 122 MMHG — HIGH (ref 83–108)
POTASSIUM SERPL-MCNC: 4.1 MMOL/L — SIGNIFICANT CHANGE UP (ref 3.5–5)
POTASSIUM SERPL-MCNC: 4.1 MMOL/L — SIGNIFICANT CHANGE UP (ref 3.5–5)
POTASSIUM SERPL-SCNC: 4.1 MMOL/L — SIGNIFICANT CHANGE UP (ref 3.5–5)
POTASSIUM SERPL-SCNC: 4.1 MMOL/L — SIGNIFICANT CHANGE UP (ref 3.5–5)
RAPID RVP RESULT: DETECTED
RAPID RVP RESULT: DETECTED
RBC # BLD: 4.72 M/UL — SIGNIFICANT CHANGE UP (ref 4.2–5.4)
RBC # BLD: 4.72 M/UL — SIGNIFICANT CHANGE UP (ref 4.2–5.4)
RBC # FLD: 15.1 % — HIGH (ref 11.5–14.5)
RBC # FLD: 15.1 % — HIGH (ref 11.5–14.5)
RSV RNA SPEC QL NAA+PROBE: DETECTED
RSV RNA SPEC QL NAA+PROBE: DETECTED
SAO2 % BLDA: 100 % — HIGH (ref 94–98)
SAO2 % BLDA: 100 % — HIGH (ref 94–98)
SARS-COV-2 RNA SPEC QL NAA+PROBE: SIGNIFICANT CHANGE UP
SARS-COV-2 RNA SPEC QL NAA+PROBE: SIGNIFICANT CHANGE UP
SODIUM SERPL-SCNC: 138 MMOL/L — SIGNIFICANT CHANGE UP (ref 135–146)
SODIUM SERPL-SCNC: 138 MMOL/L — SIGNIFICANT CHANGE UP (ref 135–146)
TROPONIN T SERPL-MCNC: <0.01 NG/ML — SIGNIFICANT CHANGE UP
TROPONIN T SERPL-MCNC: <0.01 NG/ML — SIGNIFICANT CHANGE UP
WBC # BLD: 11.05 K/UL — HIGH (ref 4.8–10.8)
WBC # BLD: 11.05 K/UL — HIGH (ref 4.8–10.8)
WBC # FLD AUTO: 11.05 K/UL — HIGH (ref 4.8–10.8)
WBC # FLD AUTO: 11.05 K/UL — HIGH (ref 4.8–10.8)

## 2023-12-01 PROCEDURE — 99291 CRITICAL CARE FIRST HOUR: CPT

## 2023-12-01 RX ORDER — DIPHENHYDRAMINE HCL 50 MG
25 CAPSULE ORAL AT BEDTIME
Refills: 0 | Status: DISCONTINUED | OUTPATIENT
Start: 2023-12-01 | End: 2023-12-11

## 2023-12-01 RX ORDER — SODIUM CHLORIDE 9 MG/ML
1000 INJECTION, SOLUTION INTRAVENOUS
Refills: 0 | Status: DISCONTINUED | OUTPATIENT
Start: 2023-12-01 | End: 2023-12-04

## 2023-12-01 RX ORDER — ACETAMINOPHEN 500 MG
1000 TABLET ORAL ONCE
Refills: 0 | Status: COMPLETED | OUTPATIENT
Start: 2023-12-01 | End: 2023-12-01

## 2023-12-01 RX ORDER — CHLORHEXIDINE GLUCONATE 213 G/1000ML
1 SOLUTION TOPICAL
Refills: 0 | Status: DISCONTINUED | OUTPATIENT
Start: 2023-12-01 | End: 2023-12-11

## 2023-12-01 RX ORDER — IPRATROPIUM/ALBUTEROL SULFATE 18-103MCG
3 AEROSOL WITH ADAPTER (GRAM) INHALATION
Refills: 0 | Status: DISCONTINUED | OUTPATIENT
Start: 2023-12-01 | End: 2023-12-02

## 2023-12-01 RX ORDER — PANTOPRAZOLE SODIUM 20 MG/1
40 TABLET, DELAYED RELEASE ORAL DAILY
Refills: 0 | Status: DISCONTINUED | OUTPATIENT
Start: 2023-12-01 | End: 2023-12-05

## 2023-12-01 RX ORDER — IPRATROPIUM/ALBUTEROL SULFATE 18-103MCG
3 AEROSOL WITH ADAPTER (GRAM) INHALATION EVERY 4 HOURS
Refills: 0 | Status: DISCONTINUED | OUTPATIENT
Start: 2023-12-01 | End: 2023-12-01

## 2023-12-01 RX ORDER — IPRATROPIUM/ALBUTEROL SULFATE 18-103MCG
3 AEROSOL WITH ADAPTER (GRAM) INHALATION
Refills: 0 | Status: DISCONTINUED | OUTPATIENT
Start: 2023-12-01 | End: 2023-12-01

## 2023-12-01 RX ADMIN — ENOXAPARIN SODIUM 40 MILLIGRAM(S): 100 INJECTION SUBCUTANEOUS at 11:54

## 2023-12-01 RX ADMIN — Medication 60 MILLIGRAM(S): at 13:12

## 2023-12-01 RX ADMIN — Medication 3 MILLILITER(S): at 10:08

## 2023-12-01 RX ADMIN — Medication 3 MILLILITER(S): at 08:05

## 2023-12-01 RX ADMIN — Medication 400 MILLIGRAM(S): at 05:55

## 2023-12-01 RX ADMIN — Medication 3 MILLILITER(S): at 12:16

## 2023-12-01 RX ADMIN — Medication 150 GRAM(S): at 00:36

## 2023-12-01 RX ADMIN — SODIUM CHLORIDE 75 MILLILITER(S): 9 INJECTION, SOLUTION INTRAVENOUS at 09:37

## 2023-12-01 RX ADMIN — Medication 400 MILLIGRAM(S): at 14:26

## 2023-12-01 RX ADMIN — Medication 25 MILLIGRAM(S): at 20:25

## 2023-12-01 RX ADMIN — Medication 3 MILLILITER(S): at 21:52

## 2023-12-01 RX ADMIN — Medication 3 MILLILITER(S): at 17:45

## 2023-12-01 RX ADMIN — Medication 60 MILLIGRAM(S): at 22:30

## 2023-12-01 RX ADMIN — Medication 3 MILLILITER(S): at 02:00

## 2023-12-01 RX ADMIN — Medication 3 MILLILITER(S): at 04:35

## 2023-12-01 RX ADMIN — Medication 60 MILLIGRAM(S): at 05:54

## 2023-12-01 RX ADMIN — Medication 3 MILLILITER(S): at 05:18

## 2023-12-01 RX ADMIN — Medication 3 MILLILITER(S): at 15:47

## 2023-12-01 RX ADMIN — Medication 3 MILLILITER(S): at 11:43

## 2023-12-01 RX ADMIN — Medication 1000 MILLIGRAM(S): at 06:30

## 2023-12-01 RX ADMIN — Medication 3 MILLILITER(S): at 08:46

## 2023-12-01 RX ADMIN — Medication 3 MILLILITER(S): at 00:36

## 2023-12-01 RX ADMIN — Medication 3 MILLILITER(S): at 09:21

## 2023-12-01 RX ADMIN — Medication 3 MILLILITER(S): at 06:45

## 2023-12-01 RX ADMIN — Medication 1000 MILLIGRAM(S): at 14:40

## 2023-12-01 RX ADMIN — Medication 3 MILLILITER(S): at 20:50

## 2023-12-01 NOTE — PATIENT PROFILE ADULT - CAREGIVER ADDRESS
[address] [facility] [id number] [address] [facility] [id number] [address] [facility] [id number]

## 2023-12-01 NOTE — PATIENT PROFILE ADULT - FALL HARM RISK - HARM RISK INTERVENTIONS
Assistance with ambulation/Assistance OOB with selected safe patient handling equipment/Communicate Risk of Fall with Harm to all staff/Discuss with provider need for PT consult/Monitor gait and stability/Provide patient with walking aids - walker, cane, crutches/Reinforce activity limits and safety measures with patient and family/Tailored Fall Risk Interventions/Visual Cue: Yellow wristband and red socks/Bed in lowest position, wheels locked, appropriate side rails in place/Call bell, personal items and telephone in reach/Instruct patient to call for assistance before getting out of bed or chair/Non-slip footwear when patient is out of bed/[place] to call system/Physically safe environment - no spills, clutter or unnecessary equipment/Purposeful Proactive Rounding/Room/bathroom lighting operational, light cord in reach Assistance with ambulation/Assistance OOB with selected safe patient handling equipment/Communicate Risk of Fall with Harm to all staff/Discuss with provider need for PT consult/Monitor gait and stability/Provide patient with walking aids - walker, cane, crutches/Reinforce activity limits and safety measures with patient and family/Tailored Fall Risk Interventions/Visual Cue: Yellow wristband and red socks/Bed in lowest position, wheels locked, appropriate side rails in place/Call bell, personal items and telephone in reach/Instruct patient to call for assistance before getting out of bed or chair/Non-slip footwear when patient is out of bed/[place] to call system/Physically safe environment - no spills, clutter or unnecessary equipment/Purposeful Proactive Rounding/Room/bathroom lighting operational, light cord in reach Assistance with ambulation/Assistance OOB with selected safe patient handling equipment/Communicate Risk of Fall with Harm to all staff/Discuss with provider need for PT consult/Monitor gait and stability/Provide patient with walking aids - walker, cane, crutches/Reinforce activity limits and safety measures with patient and family/Tailored Fall Risk Interventions/Visual Cue: Yellow wristband and red socks/Bed in lowest position, wheels locked, appropriate side rails in place/Call bell, personal items and telephone in reach/Instruct patient to call for assistance before getting out of bed or chair/Non-slip footwear when patient is out of bed/[place] to call system/Physically safe environment - no spills, clutter or unnecessary equipment/Purposeful Proactive Rounding/Room/bathroom lighting operational, light cord in reach

## 2023-12-01 NOTE — PROGRESS NOTE ADULT - SUBJECTIVE AND OBJECTIVE BOX
24H events:    Patient is a 63y old Female who presents with a chief complaint of   Primary diagnosis of Asthma exacerbation       Today is hospital day 1d.      PAST MEDICAL & SURGICAL HISTORY  Asthma    Breast cancer      SOCIAL HISTORY:  Negative for smoking/alcohol/drug use.     ALLERGIES:  contrast media (iodine-based) (Other (U))    MEDICATIONS:  STANDING MEDICATIONS  albuterol/ipratropium for Nebulization 3 milliLiter(s) Nebulizer every 1 hour  enoxaparin Injectable 40 milliGRAM(s) SubCutaneous every 24 hours  lactated ringers. 1000 milliLiter(s) IV Continuous <Continuous>  methylPREDNISolone sodium succinate Injectable 60 milliGRAM(s) IV Push every 8 hours    PRN MEDICATIONS    VITALS:   T(F): 98.2  HR: 75  BP: 142/81  RR: 24  SpO2: 99%    LABS:                        14.0   11.05 )-----------( 320      ( 01 Dec 2023 08:25 )             42.1     12-01    138  |  98  |  20  ----------------------------<  140<H>  4.1   |  26  |  0.6<L>    Ca    9.1      01 Dec 2023 08:25  Mg     2.7     12-01    TPro  7.7  /  Alb  4.9  /  TBili  0.3  /  DBili  x   /  AST  29  /  ALT  28  /  AlkPhos  102  11-30      Urinalysis Basic - ( 01 Dec 2023 08:25 )    Color: x / Appearance: x / SG: x / pH: x  Gluc: 140 mg/dL / Ketone: x  / Bili: x / Urobili: x   Blood: x / Protein: x / Nitrite: x   Leuk Esterase: x / RBC: x / WBC x   Sq Epi: x / Non Sq Epi: x / Bacteria: x      ABG - ( 01 Dec 2023 04:23 )  pH, Arterial: 7.40  pH, Blood: x     /  pCO2: 49    /  pO2: 100   / HCO3: 30    / Base Excess: 4.5   /  SaO2: 98.4              Troponin T, Serum: <0.01 ng/mL (11-30-23 @ 17:10)      CARDIAC MARKERS ( 30 Nov 2023 17:10 )  x     / <0.01 ng/mL / x     / x     / x          RADIOLOGY:    PHYSICAL EXAM:  GENERAL: NAD, on bipap  NECK: Supple, No JVD, Normal thyroid  NERVOUS SYSTEM:  Alert & Oriented X3, Good concentration  CHEST/LUNG: Clear to auscultation bilaterally  HEART: Regular rate and rhythm; No murmurs, rubs, or gallops  ABDOMEN: Soft, Nontender  EXTREMITIES:  + Peripheral Pulses

## 2023-12-01 NOTE — PROGRESS NOTE ADULT - ASSESSMENT
63-year-old Female with PMHx of HLD, breast cancer in remission 2016, and severe asthma managed by Dr. Peña with hx of intubation for asthma exacberation, who presents to the ED with complaints of SOB.    **patient unable to provide med-rec while on bipap, she knows her meds but unable to speak, please recheck with her when off bipap**    #Acute severe asthma exacerbation due to RSV infection  #Hx of intubation  #Mild leukocytosis  - CXR no infiltrates  - Almost absent air entry on exam, clinically not improving  - Started on bipap, was desatting to 80s while off bipap and on NC   - ABG grossly unremarkable   Plan  - Solumedrol 60 Q8H  - Duonebs Q1H, once clinical improvement can switch to q2  - Repeat ABG improving  - Low threshold for intubation  - D-dimer <150  - LR at 75cc/hr        #DLD  #Breast cancer  - Hold PO meds while on bipap      #DVT prophylaxis: lovenox 40mg  #GI prophylaxis: not needed  #Activity: IAT  #Diet: NPO for now

## 2023-12-01 NOTE — PATIENT PROFILE ADULT - FUNCTIONAL ASSESSMENT - BASIC MOBILITY ASSESSMENT TYPE
Patient seen in anticoagulation clinic. Reviewed past INR and dose with patient.  Patient denies any missed doses of warfarin or medication changes.  Diet and activity consistent.  Reviewed INR goal.  INR 3.0.  Will continue current dose of warfarin and recheck in 4 weeks.  Reminded patient to call office with any changes.  INR and dose per Dr DELMA Arreola's protocol.  Onsite provider Dr Paula Arreola.     Admission

## 2023-12-01 NOTE — PATIENT PROFILE ADULT - MONEY FOR FOOD
Pt extremely agitated, screaming and trying to climb from bed. Attempted to call caregivers but all numbers are saying they are disconnected.    at bedside for patient safety     Trisha Martinez RN  10/23/20 [id number] no

## 2023-12-02 LAB
ANION GAP SERPL CALC-SCNC: 13 MMOL/L — SIGNIFICANT CHANGE UP (ref 7–14)
ANION GAP SERPL CALC-SCNC: 13 MMOL/L — SIGNIFICANT CHANGE UP (ref 7–14)
BASOPHILS # BLD AUTO: 0.02 K/UL — SIGNIFICANT CHANGE UP (ref 0–0.2)
BASOPHILS # BLD AUTO: 0.02 K/UL — SIGNIFICANT CHANGE UP (ref 0–0.2)
BASOPHILS NFR BLD AUTO: 0.2 % — SIGNIFICANT CHANGE UP (ref 0–1)
BASOPHILS NFR BLD AUTO: 0.2 % — SIGNIFICANT CHANGE UP (ref 0–1)
BUN SERPL-MCNC: 29 MG/DL — HIGH (ref 10–20)
BUN SERPL-MCNC: 29 MG/DL — HIGH (ref 10–20)
CALCIUM SERPL-MCNC: 9 MG/DL — SIGNIFICANT CHANGE UP (ref 8.4–10.5)
CALCIUM SERPL-MCNC: 9 MG/DL — SIGNIFICANT CHANGE UP (ref 8.4–10.5)
CHLORIDE SERPL-SCNC: 102 MMOL/L — SIGNIFICANT CHANGE UP (ref 98–110)
CHLORIDE SERPL-SCNC: 102 MMOL/L — SIGNIFICANT CHANGE UP (ref 98–110)
CO2 SERPL-SCNC: 27 MMOL/L — SIGNIFICANT CHANGE UP (ref 17–32)
CO2 SERPL-SCNC: 27 MMOL/L — SIGNIFICANT CHANGE UP (ref 17–32)
CREAT SERPL-MCNC: 0.6 MG/DL — LOW (ref 0.7–1.5)
CREAT SERPL-MCNC: 0.6 MG/DL — LOW (ref 0.7–1.5)
EGFR: 100 ML/MIN/1.73M2 — SIGNIFICANT CHANGE UP
EGFR: 100 ML/MIN/1.73M2 — SIGNIFICANT CHANGE UP
EOSINOPHIL # BLD AUTO: 0 K/UL — SIGNIFICANT CHANGE UP (ref 0–0.7)
EOSINOPHIL # BLD AUTO: 0 K/UL — SIGNIFICANT CHANGE UP (ref 0–0.7)
EOSINOPHIL NFR BLD AUTO: 0 % — SIGNIFICANT CHANGE UP (ref 0–8)
EOSINOPHIL NFR BLD AUTO: 0 % — SIGNIFICANT CHANGE UP (ref 0–8)
GLUCOSE SERPL-MCNC: 137 MG/DL — HIGH (ref 70–99)
GLUCOSE SERPL-MCNC: 137 MG/DL — HIGH (ref 70–99)
HCT VFR BLD CALC: 44.7 % — SIGNIFICANT CHANGE UP (ref 37–47)
HCT VFR BLD CALC: 44.7 % — SIGNIFICANT CHANGE UP (ref 37–47)
HGB BLD-MCNC: 14.8 G/DL — SIGNIFICANT CHANGE UP (ref 12–16)
HGB BLD-MCNC: 14.8 G/DL — SIGNIFICANT CHANGE UP (ref 12–16)
IMM GRANULOCYTES NFR BLD AUTO: 0.5 % — HIGH (ref 0.1–0.3)
IMM GRANULOCYTES NFR BLD AUTO: 0.5 % — HIGH (ref 0.1–0.3)
LYMPHOCYTES # BLD AUTO: 1.5 K/UL — SIGNIFICANT CHANGE UP (ref 1.2–3.4)
LYMPHOCYTES # BLD AUTO: 1.5 K/UL — SIGNIFICANT CHANGE UP (ref 1.2–3.4)
LYMPHOCYTES # BLD AUTO: 11.7 % — LOW (ref 20.5–51.1)
LYMPHOCYTES # BLD AUTO: 11.7 % — LOW (ref 20.5–51.1)
MCHC RBC-ENTMCNC: 30.1 PG — SIGNIFICANT CHANGE UP (ref 27–31)
MCHC RBC-ENTMCNC: 30.1 PG — SIGNIFICANT CHANGE UP (ref 27–31)
MCHC RBC-ENTMCNC: 33.1 G/DL — SIGNIFICANT CHANGE UP (ref 32–37)
MCHC RBC-ENTMCNC: 33.1 G/DL — SIGNIFICANT CHANGE UP (ref 32–37)
MCV RBC AUTO: 91 FL — SIGNIFICANT CHANGE UP (ref 81–99)
MCV RBC AUTO: 91 FL — SIGNIFICANT CHANGE UP (ref 81–99)
MONOCYTES # BLD AUTO: 0.75 K/UL — HIGH (ref 0.1–0.6)
MONOCYTES # BLD AUTO: 0.75 K/UL — HIGH (ref 0.1–0.6)
MONOCYTES NFR BLD AUTO: 5.8 % — SIGNIFICANT CHANGE UP (ref 1.7–9.3)
MONOCYTES NFR BLD AUTO: 5.8 % — SIGNIFICANT CHANGE UP (ref 1.7–9.3)
NEUTROPHILS # BLD AUTO: 10.53 K/UL — HIGH (ref 1.4–6.5)
NEUTROPHILS # BLD AUTO: 10.53 K/UL — HIGH (ref 1.4–6.5)
NEUTROPHILS NFR BLD AUTO: 81.8 % — HIGH (ref 42.2–75.2)
NEUTROPHILS NFR BLD AUTO: 81.8 % — HIGH (ref 42.2–75.2)
NRBC # BLD: 0 /100 WBCS — SIGNIFICANT CHANGE UP (ref 0–0)
NRBC # BLD: 0 /100 WBCS — SIGNIFICANT CHANGE UP (ref 0–0)
PLATELET # BLD AUTO: 359 K/UL — SIGNIFICANT CHANGE UP (ref 130–400)
PLATELET # BLD AUTO: 359 K/UL — SIGNIFICANT CHANGE UP (ref 130–400)
PMV BLD: 9.5 FL — SIGNIFICANT CHANGE UP (ref 7.4–10.4)
PMV BLD: 9.5 FL — SIGNIFICANT CHANGE UP (ref 7.4–10.4)
POTASSIUM SERPL-MCNC: 4.6 MMOL/L — SIGNIFICANT CHANGE UP (ref 3.5–5)
POTASSIUM SERPL-MCNC: 4.6 MMOL/L — SIGNIFICANT CHANGE UP (ref 3.5–5)
POTASSIUM SERPL-SCNC: 4.6 MMOL/L — SIGNIFICANT CHANGE UP (ref 3.5–5)
POTASSIUM SERPL-SCNC: 4.6 MMOL/L — SIGNIFICANT CHANGE UP (ref 3.5–5)
PROCALCITONIN SERPL-MCNC: 0.1 NG/ML — SIGNIFICANT CHANGE UP (ref 0.02–0.1)
PROCALCITONIN SERPL-MCNC: 0.1 NG/ML — SIGNIFICANT CHANGE UP (ref 0.02–0.1)
RBC # BLD: 4.91 M/UL — SIGNIFICANT CHANGE UP (ref 4.2–5.4)
RBC # BLD: 4.91 M/UL — SIGNIFICANT CHANGE UP (ref 4.2–5.4)
RBC # FLD: 15 % — HIGH (ref 11.5–14.5)
RBC # FLD: 15 % — HIGH (ref 11.5–14.5)
SODIUM SERPL-SCNC: 142 MMOL/L — SIGNIFICANT CHANGE UP (ref 135–146)
SODIUM SERPL-SCNC: 142 MMOL/L — SIGNIFICANT CHANGE UP (ref 135–146)
WBC # BLD: 12.86 K/UL — HIGH (ref 4.8–10.8)
WBC # BLD: 12.86 K/UL — HIGH (ref 4.8–10.8)
WBC # FLD AUTO: 12.86 K/UL — HIGH (ref 4.8–10.8)
WBC # FLD AUTO: 12.86 K/UL — HIGH (ref 4.8–10.8)

## 2023-12-02 PROCEDURE — 99291 CRITICAL CARE FIRST HOUR: CPT

## 2023-12-02 PROCEDURE — 93320 DOPPLER ECHO COMPLETE: CPT | Mod: 26

## 2023-12-02 PROCEDURE — 93325 DOPPLER ECHO COLOR FLOW MAPG: CPT | Mod: 26

## 2023-12-02 PROCEDURE — 71045 X-RAY EXAM CHEST 1 VIEW: CPT | Mod: 26

## 2023-12-02 PROCEDURE — 93312 ECHO TRANSESOPHAGEAL: CPT | Mod: 26

## 2023-12-02 RX ORDER — SODIUM BICARBONATE 1 MEQ/ML
650 SYRINGE (ML) INTRAVENOUS EVERY 8 HOURS
Refills: 0 | Status: DISCONTINUED | OUTPATIENT
Start: 2023-12-02 | End: 2023-12-02

## 2023-12-02 RX ORDER — ACETAMINOPHEN 500 MG
650 TABLET ORAL ONCE
Refills: 0 | Status: COMPLETED | OUTPATIENT
Start: 2023-12-02 | End: 2023-12-02

## 2023-12-02 RX ORDER — IPRATROPIUM/ALBUTEROL SULFATE 18-103MCG
3 AEROSOL WITH ADAPTER (GRAM) INHALATION EVERY 4 HOURS
Refills: 0 | Status: DISCONTINUED | OUTPATIENT
Start: 2023-12-02 | End: 2023-12-04

## 2023-12-02 RX ADMIN — Medication 3 MILLILITER(S): at 07:01

## 2023-12-02 RX ADMIN — Medication 650 MILLIGRAM(S): at 11:17

## 2023-12-02 RX ADMIN — Medication 1 DROP(S): at 22:40

## 2023-12-02 RX ADMIN — Medication 650 MILLIGRAM(S): at 10:29

## 2023-12-02 RX ADMIN — Medication 3 MILLILITER(S): at 11:34

## 2023-12-02 RX ADMIN — SODIUM CHLORIDE 75 MILLILITER(S): 9 INJECTION, SOLUTION INTRAVENOUS at 19:33

## 2023-12-02 RX ADMIN — Medication 60 MILLIGRAM(S): at 15:10

## 2023-12-02 RX ADMIN — Medication 3 MILLILITER(S): at 08:12

## 2023-12-02 RX ADMIN — CHLORHEXIDINE GLUCONATE 1 APPLICATION(S): 213 SOLUTION TOPICAL at 06:17

## 2023-12-02 RX ADMIN — PANTOPRAZOLE SODIUM 40 MILLIGRAM(S): 20 TABLET, DELAYED RELEASE ORAL at 11:57

## 2023-12-02 RX ADMIN — Medication 3 MILLILITER(S): at 00:29

## 2023-12-02 RX ADMIN — Medication 3 MILLILITER(S): at 15:32

## 2023-12-02 RX ADMIN — ENOXAPARIN SODIUM 40 MILLIGRAM(S): 100 INJECTION SUBCUTANEOUS at 11:57

## 2023-12-02 RX ADMIN — Medication 3 MILLILITER(S): at 20:48

## 2023-12-02 RX ADMIN — Medication 60 MILLIGRAM(S): at 08:01

## 2023-12-02 RX ADMIN — Medication 60 MILLIGRAM(S): at 21:10

## 2023-12-02 RX ADMIN — Medication 25 MILLIGRAM(S): at 00:35

## 2023-12-02 NOTE — PROGRESS NOTE ADULT - SUBJECTIVE AND OBJECTIVE BOX
HPI:  Case of 63-year-old Female with PMHx of HLD, breast cancer in remission 2016, and severe asthma managed by Dr. Peña with hx of intubation for asthma exacerbation who presents to the ED with complaints of SOB.    SOB is of 3 days duration, has been progressively worsening and associated with chest pressure, sore throat, nasal congestion, and green productive cough that began two days ago and has since worsened.    Patient reports she has done 3 albuterol nebs and started on  PO streoids with no relief.     Denies fever, nausea, vomiting, abdominal pain, calf pain/swelling, hemoptysis, hx of DVT/PE, sick contacts or recent travel.    In the ED, vitals were stable  Labs wbc 11k and RSV positive    CXR no focal infiltrates (fu official result)    Given rocephin and azithro, solumedrol, duonebs and started on bipap (2023 23:21)      INTERVAL HISTORY:  - No overnight events  - Currently on bipap continuous  - RSV +    PAST MEDICAL & SURGICAL HISTORY  Asthma    Breast cancer        ALLERGIES:  contrast media (iodine-based) (Other (U))      MEDICATIONS:  MEDICATIONS  (STANDING):  albuterol/ipratropium for Nebulization 3 milliLiter(s) Nebulizer every 4 hours  chlorhexidine 2% Cloths 1 Application(s) Topical <User Schedule>  enoxaparin Injectable 40 milliGRAM(s) SubCutaneous every 24 hours  lactated ringers. 1000 milliLiter(s) (75 mL/Hr) IV Continuous <Continuous>  methylPREDNISolone sodium succinate Injectable 60 milliGRAM(s) IV Push every 8 hours  pantoprazole  Injectable 40 milliGRAM(s) IV Push daily    MEDICATIONS  (PRN):  diphenhydrAMINE Injectable 25 milliGRAM(s) IV Push at bedtime PRN Insomnia      HOME MEDICATIONS:  Home Medications:  Advair Diskus 250 mcg-50 mcg inhalation powder: 1 puff(s) inhaled 2 times a day (04 Dec 2022 08:43)  simvastatin 20 mg oral tablet: 1 tab(s) orally once a day (at bedtime) (04 Dec 2022 08:43)  Singulair:  (04 Dec 2022 08:43)  Spiriva HandiHaler 18 mcg inhalation capsule: 1 cap(s) inhaled once a day (04 Dec 2022 08:43)        OBJECTIVE:  ICU Vital Signs Last 24 Hrs  T(C): 36.7 (02 Dec 2023 12:00), Max: 36.8 (02 Dec 2023 04:00)  T(F): 98.1 (02 Dec 2023 12:00), Max: 98.2 (02 Dec 2023 04:00)  HR: 81 (02 Dec 2023 13:00) (64 - 101)  BP: 128/67 (02 Dec 2023 13:00) (121/65 - 179/106)  BP(mean): 92 (02 Dec 2023 13:00) (88 - 123)  ABP: --  ABP(mean): --  RR: 29 (02 Dec 2023 13:00) (24 - 46)  SpO2: 99% (02 Dec 2023 13:00) (99% - 100%)    O2 Parameters below as of 02 Dec 2023 07:00  Patient On (Oxygen Delivery Method): BiPAP/CPAP    O2 Concentration (%): 40        Adult Advanced Hemodynamics Last 24 Hrs  CVP(mm Hg): --  CVP(cm H2O): --  CO: --  CI: --  PA: --  PA(mean): --  PCWP: --  SVR: --  SVRI: --  PVR: --  PVRI: --  I&O's Summary    01 Dec 2023 07:01  -  02 Dec 2023 07:00  --------------------------------------------------------  IN: 1675 mL / OUT: 1600 mL / NET: 75 mL    02 Dec 2023 07:01  -  02 Dec 2023 14:19  --------------------------------------------------------  IN: 450 mL / OUT: 400 mL / NET: 50 mL      Daily     Daily Weight in k.2 (02 Dec 2023 04:00)    PHYSICAL EXAM:  GENERAL: NAD, on bipap  NECK: Supple, No JVD, Normal thyroid  NERVOUS SYSTEM:  Alert & Oriented X3, Good concentration  CHEST/LUNG: Clear to auscultation bilaterally  HEART: Regular rate and rhythm; No murmurs, rubs, or gallops  ABDOMEN: Soft, Nontender  EXTREMITIES:  + Peripheral Pulses      LABS:                        14.8   12.86 )-----------( 359      ( 02 Dec 2023 05:05 )             44.7     12    142  |  102  |  29<H>  ----------------------------<  137<H>  4.6   |  27  |  0.6<L>    Ca    9.0      02 Dec 2023 05:05  Mg     2.7         TPro  7.7  /  Alb  4.9  /  TBili  0.3  /  DBili  x   /  AST  29  /  ALT  28  /  AlkPhos  102        Troponin T, Serum: <0.01 ng/mL (23 @ 16:15)    CARDIAC MARKERS ( 01 Dec 2023 16:15 )  x     / <0.01 ng/mL / x     / x     / x      CARDIAC MARKERS ( 2023 17:10 )  x     / <0.01 ng/mL / x     / x     / x            Troponin trend:            RADIOLOGY:  -CXR:      ECG:    TELEMETRY EVENTS:

## 2023-12-02 NOTE — PROGRESS NOTE ADULT - SUBJECTIVE AND OBJECTIVE BOX
Patient is a 63y old  Female who presents with a chief complaint of       Over Night Events:  Remains critically ill BiPAP dependent.  Off pressors.          ROS:     All ROS are negative except HPI         PHYSICAL EXAM    ICU Vital Signs Last 24 Hrs  T(C): 36.7 (02 Dec 2023 08:00), Max: 36.8 (02 Dec 2023 04:00)  T(F): 98 (02 Dec 2023 08:00), Max: 98.2 (02 Dec 2023 04:00)  HR: 79 (02 Dec 2023 08:00) (64 - 101)  BP: 151/79 (02 Dec 2023 08:00) (121/65 - 179/106)  BP(mean): 109 (02 Dec 2023 08:00) (88 - 123)  ABP: --  ABP(mean): --  RR: 26 (02 Dec 2023 08:00) (24 - 46)  SpO2: 99% (02 Dec 2023 08:00) (98% - 100%)    O2 Parameters below as of 02 Dec 2023 07:00  Patient On (Oxygen Delivery Method): BiPAP/CPAP    O2 Concentration (%): 40        CONSTITUTIONAL:  In NAD    ENT:   Airway patent,   Mouth with normal mucosa.       EYES:   Pupils equal,   Round and reactive to light.    CARDIAC:   Normal rate,   Regular rhythm.        RESPIRATORY:   No wheezing  Bilateral BS  Normal chest expansion  Not tachypneic,  No use of accessory muscles    GASTROINTESTINAL:  Abdomen soft,   Non-tender,   No guarding,   + BS    MUSCULOSKELETAL:   Range of motion is not limited,  No clubbing, cyanosis    NEUROLOGICAL:   Alert and oriented   No motor  deficits.    SKIN:   Skin normal color for race,   No evidence of rash.          12-01-23 @ 07:01  -  12-02-23 @ 07:00  --------------------------------------------------------  IN:    IV PiggyBack: 100 mL    Lactated Ringers: 1575 mL  Total IN: 1675 mL    OUT:    Voided (mL): 1600 mL  Total OUT: 1600 mL    Total NET: 75 mL      12-02-23 @ 07:01  -  12-02-23 @ 09:07  --------------------------------------------------------  IN:    Lactated Ringers: 150 mL  Total IN: 150 mL    OUT:    Voided (mL): 200 mL  Total OUT: 200 mL    Total NET: -50 mL          LABS:                            14.8   12.86 )-----------( 359      ( 02 Dec 2023 05:05 )             44.7                                               12-02    142  |  102  |  29<H>  ----------------------------<  137<H>  4.6   |  27  |  0.6<L>    Ca    9.0      02 Dec 2023 05:05  Mg     2.7     12-01    TPro  7.7  /  Alb  4.9  /  TBili  0.3  /  DBili  x   /  AST  29  /  ALT  28  /  AlkPhos  102  11-30                                             Urinalysis Basic - ( 02 Dec 2023 05:05 )    Color: x / Appearance: x / SG: x / pH: x  Gluc: 137 mg/dL / Ketone: x  / Bili: x / Urobili: x   Blood: x / Protein: x / Nitrite: x   Leuk Esterase: x / RBC: x / WBC x   Sq Epi: x / Non Sq Epi: x / Bacteria: x        CARDIAC MARKERS ( 01 Dec 2023 16:15 )  x     / <0.01 ng/mL / x     / x     / x      CARDIAC MARKERS ( 30 Nov 2023 17:10 )  x     / <0.01 ng/mL / x     / x     / x                                                LIVER FUNCTIONS - ( 30 Nov 2023 17:10 )  Alb: 4.9 g/dL / Pro: 7.7 g/dL / ALK PHOS: 102 U/L / ALT: 28 U/L / AST: 29 U/L / GGT: x                                                  Culture - Blood (collected 30 Nov 2023 17:15)  Source: .Blood Blood-Peripheral  Preliminary Report (02 Dec 2023 01:03):    No growth at 24 hours    Culture - Blood (collected 30 Nov 2023 17:15)  Source: .Blood Blood-Peripheral  Preliminary Report (02 Dec 2023 01:03):    No growth at 24 hours                                                                                       ABG - ( 01 Dec 2023 04:23 )  pH, Arterial: 7.40  pH, Blood: x     /  pCO2: 49    /  pO2: 100   / HCO3: 30    / Base Excess: 4.5   /  SaO2: 98.4                MEDICATIONS  (STANDING):  albuterol/ipratropium for Nebulization 3 milliLiter(s) Nebulizer every 2 hours  chlorhexidine 2% Cloths 1 Application(s) Topical <User Schedule>  enoxaparin Injectable 40 milliGRAM(s) SubCutaneous every 24 hours  lactated ringers. 1000 milliLiter(s) (75 mL/Hr) IV Continuous <Continuous>  methylPREDNISolone sodium succinate Injectable 60 milliGRAM(s) IV Push every 8 hours  pantoprazole  Injectable 40 milliGRAM(s) IV Push daily    MEDICATIONS  (PRN):  diphenhydrAMINE Injectable 25 milliGRAM(s) IV Push at bedtime PRN Insomnia      New X-rays reviewed:                                                                                  ECHO

## 2023-12-02 NOTE — PROGRESS NOTE ADULT - ASSESSMENT
IMPRESSION:    Acute hypoxemic respiratory failure   Asthma  exacerbation   RSV  HO Severe asthma     PLAN:    CNS:  no depressants     HEENT: Oral care    PULMONARY:  HOB @ 45 degrees.  Aspiration precautions. Continue Solumedrol.  Continue Nebs.  NIV on and Off and during sleep     CARDIOVASCULAR:  gentle hydration while NPO.  ECHO    GI: GI prophylaxis.  Feeding if tolerates off NIV.  Bowel regimen     RENAL:  Follow up lytes.  Correct as needed    INFECTIOUS DISEASE: Follow up cultures.  Procal     HEMATOLOGICAL:  DVT prophylaxis.  Dimer noted.      ENDOCRINE:  Follow up FS.  Insulin protocol if needed.    MUSCULOSKELETAL:  Bed chair position     MICU

## 2023-12-03 LAB
ALBUMIN SERPL ELPH-MCNC: 3.6 G/DL — SIGNIFICANT CHANGE UP (ref 3.5–5.2)
ALBUMIN SERPL ELPH-MCNC: 3.6 G/DL — SIGNIFICANT CHANGE UP (ref 3.5–5.2)
ALP SERPL-CCNC: 72 U/L — SIGNIFICANT CHANGE UP (ref 30–115)
ALP SERPL-CCNC: 72 U/L — SIGNIFICANT CHANGE UP (ref 30–115)
ALT FLD-CCNC: 20 U/L — SIGNIFICANT CHANGE UP (ref 0–41)
ALT FLD-CCNC: 20 U/L — SIGNIFICANT CHANGE UP (ref 0–41)
ANION GAP SERPL CALC-SCNC: 13 MMOL/L — SIGNIFICANT CHANGE UP (ref 7–14)
ANION GAP SERPL CALC-SCNC: 13 MMOL/L — SIGNIFICANT CHANGE UP (ref 7–14)
AST SERPL-CCNC: 20 U/L — SIGNIFICANT CHANGE UP (ref 0–41)
AST SERPL-CCNC: 20 U/L — SIGNIFICANT CHANGE UP (ref 0–41)
BASOPHILS # BLD AUTO: 0.02 K/UL — SIGNIFICANT CHANGE UP (ref 0–0.2)
BASOPHILS # BLD AUTO: 0.02 K/UL — SIGNIFICANT CHANGE UP (ref 0–0.2)
BASOPHILS NFR BLD AUTO: 0.2 % — SIGNIFICANT CHANGE UP (ref 0–1)
BASOPHILS NFR BLD AUTO: 0.2 % — SIGNIFICANT CHANGE UP (ref 0–1)
BILIRUB SERPL-MCNC: <0.2 MG/DL — SIGNIFICANT CHANGE UP (ref 0.2–1.2)
BILIRUB SERPL-MCNC: <0.2 MG/DL — SIGNIFICANT CHANGE UP (ref 0.2–1.2)
BUN SERPL-MCNC: 31 MG/DL — HIGH (ref 10–20)
BUN SERPL-MCNC: 31 MG/DL — HIGH (ref 10–20)
CALCIUM SERPL-MCNC: 8.7 MG/DL — SIGNIFICANT CHANGE UP (ref 8.4–10.5)
CALCIUM SERPL-MCNC: 8.7 MG/DL — SIGNIFICANT CHANGE UP (ref 8.4–10.5)
CHLORIDE SERPL-SCNC: 100 MMOL/L — SIGNIFICANT CHANGE UP (ref 98–110)
CHLORIDE SERPL-SCNC: 100 MMOL/L — SIGNIFICANT CHANGE UP (ref 98–110)
CO2 SERPL-SCNC: 26 MMOL/L — SIGNIFICANT CHANGE UP (ref 17–32)
CO2 SERPL-SCNC: 26 MMOL/L — SIGNIFICANT CHANGE UP (ref 17–32)
CREAT SERPL-MCNC: 0.6 MG/DL — LOW (ref 0.7–1.5)
CREAT SERPL-MCNC: 0.6 MG/DL — LOW (ref 0.7–1.5)
EGFR: 100 ML/MIN/1.73M2 — SIGNIFICANT CHANGE UP
EGFR: 100 ML/MIN/1.73M2 — SIGNIFICANT CHANGE UP
EOSINOPHIL # BLD AUTO: 0 K/UL — SIGNIFICANT CHANGE UP (ref 0–0.7)
EOSINOPHIL # BLD AUTO: 0 K/UL — SIGNIFICANT CHANGE UP (ref 0–0.7)
EOSINOPHIL NFR BLD AUTO: 0 % — SIGNIFICANT CHANGE UP (ref 0–8)
EOSINOPHIL NFR BLD AUTO: 0 % — SIGNIFICANT CHANGE UP (ref 0–8)
GLUCOSE SERPL-MCNC: 138 MG/DL — HIGH (ref 70–99)
GLUCOSE SERPL-MCNC: 138 MG/DL — HIGH (ref 70–99)
HCT VFR BLD CALC: 42.1 % — SIGNIFICANT CHANGE UP (ref 37–47)
HCT VFR BLD CALC: 42.1 % — SIGNIFICANT CHANGE UP (ref 37–47)
HGB BLD-MCNC: 13.9 G/DL — SIGNIFICANT CHANGE UP (ref 12–16)
HGB BLD-MCNC: 13.9 G/DL — SIGNIFICANT CHANGE UP (ref 12–16)
IMM GRANULOCYTES NFR BLD AUTO: 0.5 % — HIGH (ref 0.1–0.3)
IMM GRANULOCYTES NFR BLD AUTO: 0.5 % — HIGH (ref 0.1–0.3)
LYMPHOCYTES # BLD AUTO: 1.12 K/UL — LOW (ref 1.2–3.4)
LYMPHOCYTES # BLD AUTO: 1.12 K/UL — LOW (ref 1.2–3.4)
LYMPHOCYTES # BLD AUTO: 13.9 % — LOW (ref 20.5–51.1)
LYMPHOCYTES # BLD AUTO: 13.9 % — LOW (ref 20.5–51.1)
MCHC RBC-ENTMCNC: 29.5 PG — SIGNIFICANT CHANGE UP (ref 27–31)
MCHC RBC-ENTMCNC: 29.5 PG — SIGNIFICANT CHANGE UP (ref 27–31)
MCHC RBC-ENTMCNC: 33 G/DL — SIGNIFICANT CHANGE UP (ref 32–37)
MCHC RBC-ENTMCNC: 33 G/DL — SIGNIFICANT CHANGE UP (ref 32–37)
MCV RBC AUTO: 89.4 FL — SIGNIFICANT CHANGE UP (ref 81–99)
MCV RBC AUTO: 89.4 FL — SIGNIFICANT CHANGE UP (ref 81–99)
MONOCYTES # BLD AUTO: 0.63 K/UL — HIGH (ref 0.1–0.6)
MONOCYTES # BLD AUTO: 0.63 K/UL — HIGH (ref 0.1–0.6)
MONOCYTES NFR BLD AUTO: 7.8 % — SIGNIFICANT CHANGE UP (ref 1.7–9.3)
MONOCYTES NFR BLD AUTO: 7.8 % — SIGNIFICANT CHANGE UP (ref 1.7–9.3)
NEUTROPHILS # BLD AUTO: 6.27 K/UL — SIGNIFICANT CHANGE UP (ref 1.4–6.5)
NEUTROPHILS # BLD AUTO: 6.27 K/UL — SIGNIFICANT CHANGE UP (ref 1.4–6.5)
NEUTROPHILS NFR BLD AUTO: 77.6 % — HIGH (ref 42.2–75.2)
NEUTROPHILS NFR BLD AUTO: 77.6 % — HIGH (ref 42.2–75.2)
NRBC # BLD: 0 /100 WBCS — SIGNIFICANT CHANGE UP (ref 0–0)
NRBC # BLD: 0 /100 WBCS — SIGNIFICANT CHANGE UP (ref 0–0)
PLATELET # BLD AUTO: 305 K/UL — SIGNIFICANT CHANGE UP (ref 130–400)
PLATELET # BLD AUTO: 305 K/UL — SIGNIFICANT CHANGE UP (ref 130–400)
PMV BLD: 9.3 FL — SIGNIFICANT CHANGE UP (ref 7.4–10.4)
PMV BLD: 9.3 FL — SIGNIFICANT CHANGE UP (ref 7.4–10.4)
POTASSIUM SERPL-MCNC: 4.5 MMOL/L — SIGNIFICANT CHANGE UP (ref 3.5–5)
POTASSIUM SERPL-MCNC: 4.5 MMOL/L — SIGNIFICANT CHANGE UP (ref 3.5–5)
POTASSIUM SERPL-SCNC: 4.5 MMOL/L — SIGNIFICANT CHANGE UP (ref 3.5–5)
POTASSIUM SERPL-SCNC: 4.5 MMOL/L — SIGNIFICANT CHANGE UP (ref 3.5–5)
PROT SERPL-MCNC: 6.3 G/DL — SIGNIFICANT CHANGE UP (ref 6–8)
PROT SERPL-MCNC: 6.3 G/DL — SIGNIFICANT CHANGE UP (ref 6–8)
RBC # BLD: 4.71 M/UL — SIGNIFICANT CHANGE UP (ref 4.2–5.4)
RBC # BLD: 4.71 M/UL — SIGNIFICANT CHANGE UP (ref 4.2–5.4)
RBC # FLD: 14.6 % — HIGH (ref 11.5–14.5)
RBC # FLD: 14.6 % — HIGH (ref 11.5–14.5)
SODIUM SERPL-SCNC: 139 MMOL/L — SIGNIFICANT CHANGE UP (ref 135–146)
SODIUM SERPL-SCNC: 139 MMOL/L — SIGNIFICANT CHANGE UP (ref 135–146)
WBC # BLD: 8.08 K/UL — SIGNIFICANT CHANGE UP (ref 4.8–10.8)
WBC # BLD: 8.08 K/UL — SIGNIFICANT CHANGE UP (ref 4.8–10.8)
WBC # FLD AUTO: 8.08 K/UL — SIGNIFICANT CHANGE UP (ref 4.8–10.8)
WBC # FLD AUTO: 8.08 K/UL — SIGNIFICANT CHANGE UP (ref 4.8–10.8)

## 2023-12-03 PROCEDURE — 99291 CRITICAL CARE FIRST HOUR: CPT

## 2023-12-03 RX ORDER — GUAIFENESIN/DEXTROMETHORPHAN 600MG-30MG
10 TABLET, EXTENDED RELEASE 12 HR ORAL EVERY 6 HOURS
Refills: 0 | Status: DISCONTINUED | OUTPATIENT
Start: 2023-12-03 | End: 2023-12-03

## 2023-12-03 RX ADMIN — Medication 60 MILLIGRAM(S): at 21:13

## 2023-12-03 RX ADMIN — Medication 60 MILLIGRAM(S): at 13:00

## 2023-12-03 RX ADMIN — Medication 60 MILLIGRAM(S): at 06:03

## 2023-12-03 RX ADMIN — ENOXAPARIN SODIUM 40 MILLIGRAM(S): 100 INJECTION SUBCUTANEOUS at 12:26

## 2023-12-03 RX ADMIN — Medication 3 MILLILITER(S): at 18:16

## 2023-12-03 RX ADMIN — PANTOPRAZOLE SODIUM 40 MILLIGRAM(S): 20 TABLET, DELAYED RELEASE ORAL at 12:25

## 2023-12-03 RX ADMIN — Medication 3 MILLILITER(S): at 21:13

## 2023-12-03 RX ADMIN — Medication 3 MILLILITER(S): at 11:39

## 2023-12-03 RX ADMIN — Medication 3 MILLILITER(S): at 03:15

## 2023-12-03 RX ADMIN — Medication 3 MILLILITER(S): at 07:53

## 2023-12-03 RX ADMIN — Medication 25 MILLIGRAM(S): at 00:08

## 2023-12-03 RX ADMIN — CHLORHEXIDINE GLUCONATE 1 APPLICATION(S): 213 SOLUTION TOPICAL at 06:03

## 2023-12-03 NOTE — PROGRESS NOTE ADULT - SUBJECTIVE AND OBJECTIVE BOX
Patient is a 63y old  Female who presents with a chief complaint of       Over Night Events:  Remains critically ill on NIV.  Off pressors.          ROS:     All ROS are negative except HPI         PHYSICAL EXAM    ICU Vital Signs Last 24 Hrs  T(C): 36.3 (03 Dec 2023 08:00), Max: 36.9 (02 Dec 2023 16:00)  T(F): 97.3 (03 Dec 2023 08:00), Max: 98.5 (02 Dec 2023 16:00)  HR: 63 (03 Dec 2023 09:00) (60 - 90)  BP: 132/64 (03 Dec 2023 09:00) (92/58 - 151/72)  BP(mean): 89 (03 Dec 2023 09:00) (70 - 107)  ABP: --  ABP(mean): --  RR: 24 (03 Dec 2023 09:00) (15 - 29)  SpO2: 100% (03 Dec 2023 09:00) (98% - 100%)    O2 Parameters below as of 03 Dec 2023 08:00  Patient On (Oxygen Delivery Method): BiPAP/CPAP    O2 Concentration (%): 40        CONSTITUTIONAL:  Well nourished.  NAD    ENT:   Airway patent,   Mouth with normal mucosa.   No thrush    EYES:   Pupils equal,   Round and reactive to light.    CARDIAC:   Normal rate,   Regular rhythm.    No edema      Vascular:  Normal systolic impulse  No Carotid bruits    RESPIRATORY:   Exp wheezing  Bilateral ronchi  Normal chest expansion  Not tachypneic,  No use of accessory muscles    GASTROINTESTINAL:  Abdomen soft,   Non-tender,   No guarding,   + BS    MUSCULOSKELETAL:   Range of motion is not limited,  No clubbing, cyanosis    NEUROLOGICAL:   Alert and oriented   No motor  deficits.    SKIN:   Skin normal color for race,   No evidence of rash.          12-02-23 @ 07:01  -  12-03-23 @ 07:00  --------------------------------------------------------  IN:    Lactated Ringers: 1800 mL  Total IN: 1800 mL    OUT:    Voided (mL): 800 mL  Total OUT: 800 mL    Total NET: 1000 mL          LABS:                            13.9   8.08  )-----------( 305      ( 03 Dec 2023 04:30 )             42.1                                               12-03    139  |  100  |  31<H>  ----------------------------<  138<H>  4.5   |  26  |  0.6<L>    Ca    8.7      03 Dec 2023 04:30    TPro  6.3  /  Alb  3.6  /  TBili  <0.2  /  DBili  x   /  AST  20  /  ALT  20  /  AlkPhos  72  12-03                                             Urinalysis Basic - ( 03 Dec 2023 04:30 )    Color: x / Appearance: x / SG: x / pH: x  Gluc: 138 mg/dL / Ketone: x  / Bili: x / Urobili: x   Blood: x / Protein: x / Nitrite: x   Leuk Esterase: x / RBC: x / WBC x   Sq Epi: x / Non Sq Epi: x / Bacteria: x        CARDIAC MARKERS ( 01 Dec 2023 16:15 )  x     / <0.01 ng/mL / x     / x     / x                                                LIVER FUNCTIONS - ( 03 Dec 2023 04:30 )  Alb: 3.6 g/dL / Pro: 6.3 g/dL / ALK PHOS: 72 U/L / ALT: 20 U/L / AST: 20 U/L / GGT: x                                                  Culture - Blood (collected 30 Nov 2023 17:15)  Source: .Blood Blood-Peripheral  Preliminary Report (03 Dec 2023 01:02):    No growth at 48 Hours    Culture - Blood (collected 30 Nov 2023 17:15)  Source: .Blood Blood-Peripheral  Preliminary Report (03 Dec 2023 01:02):    No growth at 48 Hours                                                                                           MEDICATIONS  (STANDING):  albuterol/ipratropium for Nebulization 3 milliLiter(s) Nebulizer every 4 hours  chlorhexidine 2% Cloths 1 Application(s) Topical <User Schedule>  enoxaparin Injectable 40 milliGRAM(s) SubCutaneous every 24 hours  lactated ringers. 1000 milliLiter(s) (75 mL/Hr) IV Continuous <Continuous>  methylPREDNISolone sodium succinate Injectable 60 milliGRAM(s) IV Push every 8 hours  pantoprazole  Injectable 40 milliGRAM(s) IV Push daily    MEDICATIONS  (PRN):  artificial  tears Solution 1 Drop(s) Both EYES three times a day PRN Dry Eyes  diphenhydrAMINE Injectable 25 milliGRAM(s) IV Push at bedtime PRN Insomnia  guaifenesin/dextromethorphan Oral Liquid 10 milliLiter(s) Oral every 6 hours PRN Cough      New X-rays reviewed:                                                                                  ECHO

## 2023-12-03 NOTE — PROGRESS NOTE ADULT - ASSESSMENT
63-year-old Female with PMHx of HLD, breast cancer in remission 2016, and severe asthma managed by Dr. Peña with hx of intubation for asthma exacberation, who presents to the ED with complaints of SOB.    **patient unable to provide med-rec while on bipap, she knows her meds but unable to speak, please recheck with her when off bipap**    #Acute severe asthma exacerbation due to RSV infection  #Hx of intubation  #Mild leukocytosis  - CXR no infiltrates  - C/W bipap on and off  - ABG grossly unremarkable   Plan  - Solumedrol 60 Q8H  - Duonebs Q4  - Repeat ABG improving  - Low threshold for intubation  - D-dimer <150  - LR at 75cc/hr        #DLD  #Breast cancer  - Hold PO meds while on bipap      #DVT prophylaxis: lovenox 40mg  #GI prophylaxis: not needed  #Activity: IAT  #Diet: NPO for now               63-year-old Female with PMHx of HLD, breast cancer in remission 2016, and severe asthma managed by Dr. Pñea with hx of intubation for asthma exacberation, who presents to the ED with complaints of SOB.    **patient unable to provide med-rec while on bipap, she knows her meds but unable to speak, please recheck with her when off bipap**    #Acute severe asthma exacerbation due to RSV infection  #Hx of intubation  #Mild leukocytosis  - CXR no infiltrates  - C/W bipap on and off  - ABG grossly unremarkable   Plan  - Solumedrol 60 Q8H  - Duonebs Q4  - Repeat ABG improving  - Low threshold for intubation  - D-dimer <150  - LR at 75cc/hr        #DLD  #Breast cancer  - Hold PO meds while on bipap      #DVT prophylaxis: lovenox 40mg  #GI prophylaxis: not needed  #Activity: IAT  #Diet: NPO for now

## 2023-12-03 NOTE — PROGRESS NOTE ADULT - ASSESSMENT
IMPRESSION:    Acute hypoxemic respiratory failure   Asthma  exacerbation   RSV  HO Severe asthma     PLAN:    CNS:  no depressants     HEENT: Oral care    PULMONARY:  HOB @ 45 degrees.  Aspiration precautions. Continue Solumedrol.  Continue Nebs.  NIV on and Off and during sleep     CARDIOVASCULAR:  gentle hydration while NPO. Follow up ECHO    GI: GI prophylaxis.  Feeding if tolerates off NIV.  Bowel regimen     RENAL:  Follow up lytes.  Correct as needed    INFECTIOUS DISEASE: Follow up cultures.  Procal     HEMATOLOGICAL:  DVT prophylaxis.  Dimer noted.      ENDOCRINE:  Follow up FS.  Insulin protocol if needed.    MUSCULOSKELETAL:  Bed chair position     MICU

## 2023-12-03 NOTE — PROGRESS NOTE ADULT - SUBJECTIVE AND OBJECTIVE BOX
24H events:    Patient is a 63y old Female who presents with a chief complaint of   Primary diagnosis of Asthma exacerbation       Today is hospital day 3d.      PAST MEDICAL & SURGICAL HISTORY  Asthma    Breast cancer      SOCIAL HISTORY:  Negative for smoking/alcohol/drug use.     ALLERGIES:  contrast media (iodine-based) (Other (U))    MEDICATIONS:  STANDING MEDICATIONS  albuterol/ipratropium for Nebulization 3 milliLiter(s) Nebulizer every 4 hours  chlorhexidine 2% Cloths 1 Application(s) Topical <User Schedule>  enoxaparin Injectable 40 milliGRAM(s) SubCutaneous every 24 hours  lactated ringers. 1000 milliLiter(s) IV Continuous <Continuous>  methylPREDNISolone sodium succinate Injectable 60 milliGRAM(s) IV Push every 8 hours  pantoprazole  Injectable 40 milliGRAM(s) IV Push daily    PRN MEDICATIONS  artificial  tears Solution 1 Drop(s) Both EYES three times a day PRN  diphenhydrAMINE Injectable 25 milliGRAM(s) IV Push at bedtime PRN    VITALS:   T(F): 98.4  HR: 73  BP: 128/80  RR: 15  SpO2: 99%    LABS:                        14.8   12.86 )-----------( 359      ( 02 Dec 2023 05:05 )             44.7     12-02    142  |  102  |  29<H>  ----------------------------<  137<H>  4.6   |  27  |  0.6<L>    Ca    9.0      02 Dec 2023 05:05  Mg     2.7     12-01        Urinalysis Basic - ( 02 Dec 2023 05:05 )    Color: x / Appearance: x / SG: x / pH: x  Gluc: 137 mg/dL / Ketone: x  / Bili: x / Urobili: x   Blood: x / Protein: x / Nitrite: x   Leuk Esterase: x / RBC: x / WBC x   Sq Epi: x / Non Sq Epi: x / Bacteria: x      ABG - ( 01 Dec 2023 04:23 )  pH, Arterial: 7.40  pH, Blood: x     /  pCO2: 49    /  pO2: 100   / HCO3: 30    / Base Excess: 4.5   /  SaO2: 98.4                  Culture - Blood (collected 30 Nov 2023 17:15)  Source: .Blood Blood-Peripheral  Preliminary Report (02 Dec 2023 01:03):    No growth at 24 hours    Culture - Blood (collected 30 Nov 2023 17:15)  Source: .Blood Blood-Peripheral  Preliminary Report (02 Dec 2023 01:03):    No growth at 24 hours      CARDIAC MARKERS ( 01 Dec 2023 16:15 )  x     / <0.01 ng/mL / x     / x     / x          RADIOLOGY:    PHYSICAL EXAM:  GENERAL: NAD  NECK: Supple, No JVD, Normal thyroid  NERVOUS SYSTEM:  Alert & Oriented X3, Good concentration  CHEST/LUNG: Mild B/L wheezing  HEART: Regular rate and rhythm; No murmurs, rubs, or gallops  ABDOMEN: Soft, Nontender  EXTREMITIES:  + Peripheral Pulses

## 2023-12-03 NOTE — PHARMACOTHERAPY INTERVENTION NOTE - COMMENTS
med rounds-plan-start guaifenesin/codeine 100mg/10mg per 5ml -dose 10ml po q6h prn cough, for filed med rounds-plan-start guaifenesin/codeine 100mg/10mg per 5ml -dose 10ml po q6h prn cough, non formulary form filed

## 2023-12-04 LAB
ALBUMIN SERPL ELPH-MCNC: 4 G/DL — SIGNIFICANT CHANGE UP (ref 3.5–5.2)
ALBUMIN SERPL ELPH-MCNC: 4 G/DL — SIGNIFICANT CHANGE UP (ref 3.5–5.2)
ALP SERPL-CCNC: 69 U/L — SIGNIFICANT CHANGE UP (ref 30–115)
ALP SERPL-CCNC: 69 U/L — SIGNIFICANT CHANGE UP (ref 30–115)
ALT FLD-CCNC: 19 U/L — SIGNIFICANT CHANGE UP (ref 0–41)
ALT FLD-CCNC: 19 U/L — SIGNIFICANT CHANGE UP (ref 0–41)
ANION GAP SERPL CALC-SCNC: 14 MMOL/L — SIGNIFICANT CHANGE UP (ref 7–14)
ANION GAP SERPL CALC-SCNC: 14 MMOL/L — SIGNIFICANT CHANGE UP (ref 7–14)
AST SERPL-CCNC: 21 U/L — SIGNIFICANT CHANGE UP (ref 0–41)
AST SERPL-CCNC: 21 U/L — SIGNIFICANT CHANGE UP (ref 0–41)
BASOPHILS # BLD AUTO: 0.01 K/UL — SIGNIFICANT CHANGE UP (ref 0–0.2)
BASOPHILS # BLD AUTO: 0.01 K/UL — SIGNIFICANT CHANGE UP (ref 0–0.2)
BASOPHILS NFR BLD AUTO: 0.1 % — SIGNIFICANT CHANGE UP (ref 0–1)
BASOPHILS NFR BLD AUTO: 0.1 % — SIGNIFICANT CHANGE UP (ref 0–1)
BILIRUB SERPL-MCNC: 0.3 MG/DL — SIGNIFICANT CHANGE UP (ref 0.2–1.2)
BILIRUB SERPL-MCNC: 0.3 MG/DL — SIGNIFICANT CHANGE UP (ref 0.2–1.2)
BUN SERPL-MCNC: 30 MG/DL — HIGH (ref 10–20)
BUN SERPL-MCNC: 30 MG/DL — HIGH (ref 10–20)
CALCIUM SERPL-MCNC: 8.8 MG/DL — SIGNIFICANT CHANGE UP (ref 8.4–10.4)
CALCIUM SERPL-MCNC: 8.8 MG/DL — SIGNIFICANT CHANGE UP (ref 8.4–10.4)
CHLORIDE SERPL-SCNC: 99 MMOL/L — SIGNIFICANT CHANGE UP (ref 98–110)
CHLORIDE SERPL-SCNC: 99 MMOL/L — SIGNIFICANT CHANGE UP (ref 98–110)
CO2 SERPL-SCNC: 25 MMOL/L — SIGNIFICANT CHANGE UP (ref 17–32)
CO2 SERPL-SCNC: 25 MMOL/L — SIGNIFICANT CHANGE UP (ref 17–32)
CREAT SERPL-MCNC: 0.6 MG/DL — LOW (ref 0.7–1.5)
CREAT SERPL-MCNC: 0.6 MG/DL — LOW (ref 0.7–1.5)
EGFR: 100 ML/MIN/1.73M2 — SIGNIFICANT CHANGE UP
EGFR: 100 ML/MIN/1.73M2 — SIGNIFICANT CHANGE UP
EOSINOPHIL # BLD AUTO: 0 K/UL — SIGNIFICANT CHANGE UP (ref 0–0.7)
EOSINOPHIL # BLD AUTO: 0 K/UL — SIGNIFICANT CHANGE UP (ref 0–0.7)
EOSINOPHIL NFR BLD AUTO: 0 % — SIGNIFICANT CHANGE UP (ref 0–8)
EOSINOPHIL NFR BLD AUTO: 0 % — SIGNIFICANT CHANGE UP (ref 0–8)
GLUCOSE SERPL-MCNC: 129 MG/DL — HIGH (ref 70–99)
GLUCOSE SERPL-MCNC: 129 MG/DL — HIGH (ref 70–99)
HCT VFR BLD CALC: 42.7 % — SIGNIFICANT CHANGE UP (ref 37–47)
HCT VFR BLD CALC: 42.7 % — SIGNIFICANT CHANGE UP (ref 37–47)
HGB BLD-MCNC: 14.3 G/DL — SIGNIFICANT CHANGE UP (ref 12–16)
HGB BLD-MCNC: 14.3 G/DL — SIGNIFICANT CHANGE UP (ref 12–16)
IMM GRANULOCYTES NFR BLD AUTO: 0.5 % — HIGH (ref 0.1–0.3)
IMM GRANULOCYTES NFR BLD AUTO: 0.5 % — HIGH (ref 0.1–0.3)
LYMPHOCYTES # BLD AUTO: 1.29 K/UL — SIGNIFICANT CHANGE UP (ref 1.2–3.4)
LYMPHOCYTES # BLD AUTO: 1.29 K/UL — SIGNIFICANT CHANGE UP (ref 1.2–3.4)
LYMPHOCYTES # BLD AUTO: 13.7 % — LOW (ref 20.5–51.1)
LYMPHOCYTES # BLD AUTO: 13.7 % — LOW (ref 20.5–51.1)
MCHC RBC-ENTMCNC: 30 PG — SIGNIFICANT CHANGE UP (ref 27–31)
MCHC RBC-ENTMCNC: 30 PG — SIGNIFICANT CHANGE UP (ref 27–31)
MCHC RBC-ENTMCNC: 33.5 G/DL — SIGNIFICANT CHANGE UP (ref 32–37)
MCHC RBC-ENTMCNC: 33.5 G/DL — SIGNIFICANT CHANGE UP (ref 32–37)
MCV RBC AUTO: 89.7 FL — SIGNIFICANT CHANGE UP (ref 81–99)
MCV RBC AUTO: 89.7 FL — SIGNIFICANT CHANGE UP (ref 81–99)
MONOCYTES # BLD AUTO: 0.67 K/UL — HIGH (ref 0.1–0.6)
MONOCYTES # BLD AUTO: 0.67 K/UL — HIGH (ref 0.1–0.6)
MONOCYTES NFR BLD AUTO: 7.1 % — SIGNIFICANT CHANGE UP (ref 1.7–9.3)
MONOCYTES NFR BLD AUTO: 7.1 % — SIGNIFICANT CHANGE UP (ref 1.7–9.3)
NEUTROPHILS # BLD AUTO: 7.43 K/UL — HIGH (ref 1.4–6.5)
NEUTROPHILS # BLD AUTO: 7.43 K/UL — HIGH (ref 1.4–6.5)
NEUTROPHILS NFR BLD AUTO: 78.6 % — HIGH (ref 42.2–75.2)
NEUTROPHILS NFR BLD AUTO: 78.6 % — HIGH (ref 42.2–75.2)
NRBC # BLD: 0 /100 WBCS — SIGNIFICANT CHANGE UP (ref 0–0)
NRBC # BLD: 0 /100 WBCS — SIGNIFICANT CHANGE UP (ref 0–0)
PLATELET # BLD AUTO: 310 K/UL — SIGNIFICANT CHANGE UP (ref 130–400)
PLATELET # BLD AUTO: 310 K/UL — SIGNIFICANT CHANGE UP (ref 130–400)
PMV BLD: 9.4 FL — SIGNIFICANT CHANGE UP (ref 7.4–10.4)
PMV BLD: 9.4 FL — SIGNIFICANT CHANGE UP (ref 7.4–10.4)
POTASSIUM SERPL-MCNC: 4.5 MMOL/L — SIGNIFICANT CHANGE UP (ref 3.5–5)
POTASSIUM SERPL-MCNC: 4.5 MMOL/L — SIGNIFICANT CHANGE UP (ref 3.5–5)
POTASSIUM SERPL-SCNC: 4.5 MMOL/L — SIGNIFICANT CHANGE UP (ref 3.5–5)
POTASSIUM SERPL-SCNC: 4.5 MMOL/L — SIGNIFICANT CHANGE UP (ref 3.5–5)
PROT SERPL-MCNC: 6.5 G/DL — SIGNIFICANT CHANGE UP (ref 6–8)
PROT SERPL-MCNC: 6.5 G/DL — SIGNIFICANT CHANGE UP (ref 6–8)
RBC # BLD: 4.76 M/UL — SIGNIFICANT CHANGE UP (ref 4.2–5.4)
RBC # BLD: 4.76 M/UL — SIGNIFICANT CHANGE UP (ref 4.2–5.4)
RBC # FLD: 14.4 % — SIGNIFICANT CHANGE UP (ref 11.5–14.5)
RBC # FLD: 14.4 % — SIGNIFICANT CHANGE UP (ref 11.5–14.5)
SODIUM SERPL-SCNC: 138 MMOL/L — SIGNIFICANT CHANGE UP (ref 135–146)
SODIUM SERPL-SCNC: 138 MMOL/L — SIGNIFICANT CHANGE UP (ref 135–146)
WBC # BLD: 9.45 K/UL — SIGNIFICANT CHANGE UP (ref 4.8–10.8)
WBC # BLD: 9.45 K/UL — SIGNIFICANT CHANGE UP (ref 4.8–10.8)
WBC # FLD AUTO: 9.45 K/UL — SIGNIFICANT CHANGE UP (ref 4.8–10.8)
WBC # FLD AUTO: 9.45 K/UL — SIGNIFICANT CHANGE UP (ref 4.8–10.8)

## 2023-12-04 PROCEDURE — 99291 CRITICAL CARE FIRST HOUR: CPT

## 2023-12-04 PROCEDURE — 71045 X-RAY EXAM CHEST 1 VIEW: CPT | Mod: 26

## 2023-12-04 RX ORDER — OLANZAPINE 15 MG/1
2.5 TABLET, FILM COATED ORAL
Refills: 0 | Status: DISCONTINUED | OUTPATIENT
Start: 2023-12-04 | End: 2023-12-04

## 2023-12-04 RX ORDER — OLANZAPINE 15 MG/1
2.5 TABLET, FILM COATED ORAL DAILY
Refills: 0 | Status: DISCONTINUED | OUTPATIENT
Start: 2023-12-04 | End: 2023-12-11

## 2023-12-04 RX ORDER — IPRATROPIUM/ALBUTEROL SULFATE 18-103MCG
3 AEROSOL WITH ADAPTER (GRAM) INHALATION EVERY 4 HOURS
Refills: 0 | Status: DISCONTINUED | OUTPATIENT
Start: 2023-12-04 | End: 2023-12-04

## 2023-12-04 RX ORDER — IPRATROPIUM/ALBUTEROL SULFATE 18-103MCG
3 AEROSOL WITH ADAPTER (GRAM) INHALATION EVERY 6 HOURS
Refills: 0 | Status: DISCONTINUED | OUTPATIENT
Start: 2023-12-04 | End: 2023-12-11

## 2023-12-04 RX ORDER — IPRATROPIUM/ALBUTEROL SULFATE 18-103MCG
3 AEROSOL WITH ADAPTER (GRAM) INHALATION EVERY 4 HOURS
Refills: 0 | Status: DISCONTINUED | OUTPATIENT
Start: 2023-12-04 | End: 2023-12-11

## 2023-12-04 RX ADMIN — Medication 3 MILLILITER(S): at 14:32

## 2023-12-04 RX ADMIN — PANTOPRAZOLE SODIUM 40 MILLIGRAM(S): 20 TABLET, DELAYED RELEASE ORAL at 12:21

## 2023-12-04 RX ADMIN — Medication 3 MILLILITER(S): at 03:06

## 2023-12-04 RX ADMIN — Medication 25 MILLIGRAM(S): at 00:14

## 2023-12-04 RX ADMIN — ENOXAPARIN SODIUM 40 MILLIGRAM(S): 100 INJECTION SUBCUTANEOUS at 12:13

## 2023-12-04 RX ADMIN — Medication 60 MILLIGRAM(S): at 05:19

## 2023-12-04 RX ADMIN — Medication 3 MILLILITER(S): at 19:42

## 2023-12-04 RX ADMIN — CHLORHEXIDINE GLUCONATE 1 APPLICATION(S): 213 SOLUTION TOPICAL at 05:19

## 2023-12-04 RX ADMIN — OLANZAPINE 2.5 MILLIGRAM(S): 15 TABLET, FILM COATED ORAL at 12:13

## 2023-12-04 RX ADMIN — Medication 3 MILLILITER(S): at 09:44

## 2023-12-04 RX ADMIN — Medication 3 MILLILITER(S): at 00:22

## 2023-12-04 RX ADMIN — Medication 40 MILLIGRAM(S): at 17:09

## 2023-12-04 NOTE — PROGRESS NOTE ADULT - SUBJECTIVE AND OBJECTIVE BOX
24H events:    Patient is a 63y old Female who presents with a chief complaint of   Primary diagnosis of Asthma exacerbation       Today is hospital day 4d.      PAST MEDICAL & SURGICAL HISTORY  Asthma    Breast cancer      SOCIAL HISTORY:  Negative for smoking/alcohol/drug use.     ALLERGIES:  contrast media (iodine-based) (Other (U))    MEDICATIONS:  STANDING MEDICATIONS  albuterol/ipratropium for Nebulization 3 milliLiter(s) Nebulizer every 6 hours  chlorhexidine 2% Cloths 1 Application(s) Topical <User Schedule>  enoxaparin Injectable 40 milliGRAM(s) SubCutaneous every 24 hours  methylPREDNISolone sodium succinate Injectable 40 milliGRAM(s) IV Push two times a day  OLANZapine 2.5 milliGRAM(s) Oral daily  pantoprazole  Injectable 40 milliGRAM(s) IV Push daily    PRN MEDICATIONS  albuterol/ipratropium for Nebulization 3 milliLiter(s) Nebulizer every 4 hours PRN  artificial  tears Solution 1 Drop(s) Both EYES three times a day PRN  diphenhydrAMINE Injectable 25 milliGRAM(s) IV Push at bedtime PRN  Guaifenesin/codeine 100mg/10mg per 5ml 10 milliLiter(s) 10 milliLiter(s) Oral every 6 hours PRN    VITALS:   T(F): 96.8  HR: 68  BP: 131/73  RR: 20  SpO2: 100%    LABS:                        14.3   9.45  )-----------( 310      ( 04 Dec 2023 05:56 )             42.7     12-04    138  |  99  |  30<H>  ----------------------------<  129<H>  4.5   |  25  |  0.6<L>    Ca    8.8      04 Dec 2023 05:56    TPro  6.5  /  Alb  4.0  /  TBili  0.3  /  DBili  x   /  AST  21  /  ALT  19  /  AlkPhos  69  12-04      Urinalysis Basic - ( 04 Dec 2023 05:56 )    Color: x / Appearance: x / SG: x / pH: x  Gluc: 129 mg/dL / Ketone: x  / Bili: x / Urobili: x   Blood: x / Protein: x / Nitrite: x   Leuk Esterase: x / RBC: x / WBC x   Sq Epi: x / Non Sq Epi: x / Bacteria: x                RADIOLOGY:    PHYSICAL EXAM:  GENERAL: NAD  NECK: Supple, No JVD, Normal thyroid  NERVOUS SYSTEM:  Alert & Oriented X3, Good concentration  CHEST/LUNG: Mild wheezing  HEART: Regular rate and rhythm; No murmurs, rubs, or gallops  ABDOMEN: Soft, Nontender  EXTREMITIES:  + Peripheral Pulses

## 2023-12-04 NOTE — PROGRESS NOTE ADULT - SUBJECTIVE AND OBJECTIVE BOX
Patient is a 63y old  Female who presents with a chief complaint of       Over Night Events: on HFNC 50L40%, LR@75cc/hr, comfortable, no acute events overnight       Vital Signs Last 24 Hrs  T(C): 36 (04 Dec 2023 08:03), Max: 36.8 (03 Dec 2023 12:00)  T(F): 96.8 (04 Dec 2023 08:03), Max: 98.2 (03 Dec 2023 12:00)  HR: 66 (04 Dec 2023 09:02) (62 - 109)  BP: 128/76 (04 Dec 2023 09:02) (101/56 - 161/81)  BP(mean): 96 (04 Dec 2023 09:02) (77 - 112)  RR: 18 (04 Dec 2023 09:02) (13 - 29)  SpO2: 100% (04 Dec 2023 09:02) (97% - 100%)    O2 Parameters below as of 04 Dec 2023 09:02  Patient On (Oxygen Delivery Method): BiPAP/CPAP    O2 Concentration (%): 40      CONSTITUTIONAL:   Ill appearing.   NAD    ENT:   Airway patent,   Mouth with normal mucosa.   No thrush    EYES:   Pupils equal,   Round and reactive to light.    CARDIAC:   Normal rate,   Regular rhythm.        RESPIRATORY:   Decreased bilateral air entry  Mild wwheezing  Bilateral BS  Normal chest expansion  Not tachypneic,  No use of accessory muscles    GASTROINTESTINAL:  Abdomen soft,   Non-tender,   No guarding,   + BS      MUSCULOSKELETAL:   Range of motion is not limited,    NEUROLOGICAL:   Alert and oriented       SKIN:   Skin normal color for race,   Warm and dry  No evidence of rash.    PSYCHIATRIC:   No apparent risk to self or others.        12-03-23 @ 07:01  -  12-04-23 @ 07:00  --------------------------------------------------------  IN:    Lactated Ringers: 1725 mL  Total IN: 1725 mL    OUT:    Voided (mL): 1700 mL  Total OUT: 1700 mL    Total NET: 25 mL      12-04-23 @ 07:01  -  12-04-23 @ 09:40  --------------------------------------------------------  IN:    Lactated Ringers: 150 mL  Total IN: 150 mL    OUT:  Total OUT: 0 mL    Total NET: 150 mL          LABS:                            14.3   9.45  )-----------( 310      ( 04 Dec 2023 05:56 )             42.7                                               12-04    138  |  99  |  30<H>  ----------------------------<  129<H>  4.5   |  25  |  0.6<L>    Ca    8.8      04 Dec 2023 05:56    TPro  6.5  /  Alb  4.0  /  TBili  0.3  /  DBili  x   /  AST  21  /  ALT  19  /  AlkPhos  69  12-04                                             Urinalysis Basic - ( 04 Dec 2023 05:56 )    Color: x / Appearance: x / SG: x / pH: x  Gluc: 129 mg/dL / Ketone: x  / Bili: x / Urobili: x   Blood: x / Protein: x / Nitrite: x   Leuk Esterase: x / RBC: x / WBC x   Sq Epi: x / Non Sq Epi: x / Bacteria: x                                                  LIVER FUNCTIONS - ( 04 Dec 2023 05:56 )  Alb: 4.0 g/dL / Pro: 6.5 g/dL / ALK PHOS: 69 U/L / ALT: 19 U/L / AST: 21 U/L / GGT: x                                                                                                                                       MEDICATIONS  (STANDING):  albuterol/ipratropium for Nebulization 3 milliLiter(s) Nebulizer every 4 hours  chlorhexidine 2% Cloths 1 Application(s) Topical <User Schedule>  enoxaparin Injectable 40 milliGRAM(s) SubCutaneous every 24 hours  lactated ringers. 1000 milliLiter(s) (75 mL/Hr) IV Continuous <Continuous>  methylPREDNISolone sodium succinate Injectable 60 milliGRAM(s) IV Push every 8 hours  pantoprazole  Injectable 40 milliGRAM(s) IV Push daily    MEDICATIONS  (PRN):  artificial  tears Solution 1 Drop(s) Both EYES three times a day PRN Dry Eyes  diphenhydrAMINE Injectable 25 milliGRAM(s) IV Push at bedtime PRN Insomnia  Guaifenesin/codeine 100mg/10mg per 5ml 10 milliLiter(s) 10 milliLiter(s) Oral every 6 hours PRN cough      CXR reviewed

## 2023-12-04 NOTE — PHARMACOTHERAPY INTERVENTION NOTE - COMMENTS
olanzapine 2.5mg po [id number] yo F, recommended starting 2.5mg po daily olanzapine 2.5mg po [id number] yo F, recommended starting 2.5mg po daily

## 2023-12-04 NOTE — PROGRESS NOTE ADULT - ASSESSMENT
IMPRESSION:    Acute hypoxemic respiratory failure, improving   Asthma  exacerbation   RSV Infection   HO Severe asthma     PLAN:    CNS:  mental status at baseline, Zyprexa 2.5mg BID     HEENT: Oral care    PULMONARY:  HOB @ 45 degrees.  Aspiration precautions. Decrease Solumedrol 40mg to BID.  Continue Nebs Q6H and PRN.  NIV QHS and PRN, alternate HFNC with NC, incentive spirometry     CARDIOVASCULAR:  2D-ECHO EF 73%, avoid volume overload, DC IVF    GI: GI prophylaxis.  Feeding off NIV.  Bowel regimen     RENAL:  Follow up lytes.  Correct as needed    INFECTIOUS DISEASE: Follow up cultures, monitoring off ABX Procal 0.1     HEMATOLOGICAL:  DVT prophylaxis.  Dimer negative    ENDOCRINE:  Follow up FS.  Insulin protocol if needed.    MUSCULOSKELETAL:  OOBTC    MICU monitoring

## 2023-12-04 NOTE — PROGRESS NOTE ADULT - ASSESSMENT
63-year-old Female with PMHx of HLD, breast cancer in remission 2016, and severe asthma managed by Dr. Peña with hx of intubation for asthma exacberation, who presents to the ED with complaints of SOB.      #Acute severe asthma exacerbation due to RSV infection  #Hx of intubation  #Mild leukocytosis  - CXR no infiltrates  - C/W bipap on and off  - ABG grossly unremarkable   Plan  - Solumedrol 40bid  - Duonebs Q6 and PRN  -Low threshold for intubation  -D-dimer <150  -LR at 75cc/hr  - Start feeding        #DLD  #Breast cancer  - Hold PO meds while on bipap      #DVT prophylaxis: lovenox 40mg  #GI prophylaxis: not needed  #Activity: IAT               63-year-old Female with PMHx of HLD, breast cancer in remission 2016, and severe asthma managed by Dr. Peña with hx of intubation for asthma exacberation, who presents to the ED with complaints of SOB.      #Acute severe asthma exacerbation due to RSV infection  #Hx of intubation  #Mild leukocytosis  - CXR no infiltrates  - C/W bipap on and off  - ABG grossly unremarkable   Plan  - Solumedrol 40bid  - Duonebs Q6 and PRN  -Low threshold for intubation  -D-dimer <150  - Start feeding        #DLD  #Breast cancer  - Hold PO meds while on bipap      #DVT prophylaxis: lovenox 40mg  #GI prophylaxis: not needed  #Activity: IAT               63-year-old Female with PMHx of HLD, breast cancer in remission 2016, and severe asthma managed by Dr. Peña with hx of intubation for asthma exacberation, who presents to the ED with complaints of SOB.      #Acute severe asthma exacerbation due to RSV infection  #Hx of intubation  #Mild leukocytosis  - CXR no infiltrates  - continue alternating between NC and HFNC  - ABG grossly unremarkable   Plan  - Solumedrol 40bid  - Duonebs Q6 and PRN  -Low threshold for intubation  -D-dimer <150  - Start feeding        #DLD  #Breast cancer  - Hold PO meds while on bipap      #DVT prophylaxis: lovenox 40mg  #GI prophylaxis: not needed  #Activity: IAT

## 2023-12-05 LAB
ANION GAP SERPL CALC-SCNC: 7 MMOL/L — SIGNIFICANT CHANGE UP (ref 7–14)
ANION GAP SERPL CALC-SCNC: 7 MMOL/L — SIGNIFICANT CHANGE UP (ref 7–14)
BASOPHILS # BLD AUTO: 0.02 K/UL — SIGNIFICANT CHANGE UP (ref 0–0.2)
BASOPHILS # BLD AUTO: 0.02 K/UL — SIGNIFICANT CHANGE UP (ref 0–0.2)
BASOPHILS NFR BLD AUTO: 0.2 % — SIGNIFICANT CHANGE UP (ref 0–1)
BASOPHILS NFR BLD AUTO: 0.2 % — SIGNIFICANT CHANGE UP (ref 0–1)
BUN SERPL-MCNC: 29 MG/DL — HIGH (ref 10–20)
BUN SERPL-MCNC: 29 MG/DL — HIGH (ref 10–20)
CALCIUM SERPL-MCNC: 8.7 MG/DL — SIGNIFICANT CHANGE UP (ref 8.4–10.5)
CALCIUM SERPL-MCNC: 8.7 MG/DL — SIGNIFICANT CHANGE UP (ref 8.4–10.5)
CHLORIDE SERPL-SCNC: 101 MMOL/L — SIGNIFICANT CHANGE UP (ref 98–110)
CHLORIDE SERPL-SCNC: 101 MMOL/L — SIGNIFICANT CHANGE UP (ref 98–110)
CO2 SERPL-SCNC: 30 MMOL/L — SIGNIFICANT CHANGE UP (ref 17–32)
CO2 SERPL-SCNC: 30 MMOL/L — SIGNIFICANT CHANGE UP (ref 17–32)
CREAT SERPL-MCNC: 0.5 MG/DL — LOW (ref 0.7–1.5)
CREAT SERPL-MCNC: 0.5 MG/DL — LOW (ref 0.7–1.5)
EGFR: 105 ML/MIN/1.73M2 — SIGNIFICANT CHANGE UP
EGFR: 105 ML/MIN/1.73M2 — SIGNIFICANT CHANGE UP
EOSINOPHIL # BLD AUTO: 0.02 K/UL — SIGNIFICANT CHANGE UP (ref 0–0.7)
EOSINOPHIL # BLD AUTO: 0.02 K/UL — SIGNIFICANT CHANGE UP (ref 0–0.7)
EOSINOPHIL NFR BLD AUTO: 0.2 % — SIGNIFICANT CHANGE UP (ref 0–8)
EOSINOPHIL NFR BLD AUTO: 0.2 % — SIGNIFICANT CHANGE UP (ref 0–8)
GLUCOSE SERPL-MCNC: 136 MG/DL — HIGH (ref 70–99)
GLUCOSE SERPL-MCNC: 136 MG/DL — HIGH (ref 70–99)
HCT VFR BLD CALC: 43.3 % — SIGNIFICANT CHANGE UP (ref 37–47)
HCT VFR BLD CALC: 43.3 % — SIGNIFICANT CHANGE UP (ref 37–47)
HGB BLD-MCNC: 14.4 G/DL — SIGNIFICANT CHANGE UP (ref 12–16)
HGB BLD-MCNC: 14.4 G/DL — SIGNIFICANT CHANGE UP (ref 12–16)
IMM GRANULOCYTES NFR BLD AUTO: 0.5 % — HIGH (ref 0.1–0.3)
IMM GRANULOCYTES NFR BLD AUTO: 0.5 % — HIGH (ref 0.1–0.3)
LYMPHOCYTES # BLD AUTO: 2.05 K/UL — SIGNIFICANT CHANGE UP (ref 1.2–3.4)
LYMPHOCYTES # BLD AUTO: 2.05 K/UL — SIGNIFICANT CHANGE UP (ref 1.2–3.4)
LYMPHOCYTES # BLD AUTO: 22.2 % — SIGNIFICANT CHANGE UP (ref 20.5–51.1)
LYMPHOCYTES # BLD AUTO: 22.2 % — SIGNIFICANT CHANGE UP (ref 20.5–51.1)
MAGNESIUM SERPL-MCNC: 2.6 MG/DL — HIGH (ref 1.8–2.4)
MAGNESIUM SERPL-MCNC: 2.6 MG/DL — HIGH (ref 1.8–2.4)
MCHC RBC-ENTMCNC: 29.7 PG — SIGNIFICANT CHANGE UP (ref 27–31)
MCHC RBC-ENTMCNC: 29.7 PG — SIGNIFICANT CHANGE UP (ref 27–31)
MCHC RBC-ENTMCNC: 33.3 G/DL — SIGNIFICANT CHANGE UP (ref 32–37)
MCHC RBC-ENTMCNC: 33.3 G/DL — SIGNIFICANT CHANGE UP (ref 32–37)
MCV RBC AUTO: 89.3 FL — SIGNIFICANT CHANGE UP (ref 81–99)
MCV RBC AUTO: 89.3 FL — SIGNIFICANT CHANGE UP (ref 81–99)
MONOCYTES # BLD AUTO: 0.98 K/UL — HIGH (ref 0.1–0.6)
MONOCYTES # BLD AUTO: 0.98 K/UL — HIGH (ref 0.1–0.6)
MONOCYTES NFR BLD AUTO: 10.6 % — HIGH (ref 1.7–9.3)
MONOCYTES NFR BLD AUTO: 10.6 % — HIGH (ref 1.7–9.3)
NEUTROPHILS # BLD AUTO: 6.1 K/UL — SIGNIFICANT CHANGE UP (ref 1.4–6.5)
NEUTROPHILS # BLD AUTO: 6.1 K/UL — SIGNIFICANT CHANGE UP (ref 1.4–6.5)
NEUTROPHILS NFR BLD AUTO: 66.3 % — SIGNIFICANT CHANGE UP (ref 42.2–75.2)
NEUTROPHILS NFR BLD AUTO: 66.3 % — SIGNIFICANT CHANGE UP (ref 42.2–75.2)
NRBC # BLD: 0 /100 WBCS — SIGNIFICANT CHANGE UP (ref 0–0)
NRBC # BLD: 0 /100 WBCS — SIGNIFICANT CHANGE UP (ref 0–0)
PLATELET # BLD AUTO: 315 K/UL — SIGNIFICANT CHANGE UP (ref 130–400)
PLATELET # BLD AUTO: 315 K/UL — SIGNIFICANT CHANGE UP (ref 130–400)
PMV BLD: 9.3 FL — SIGNIFICANT CHANGE UP (ref 7.4–10.4)
PMV BLD: 9.3 FL — SIGNIFICANT CHANGE UP (ref 7.4–10.4)
POTASSIUM SERPL-MCNC: 4.6 MMOL/L — SIGNIFICANT CHANGE UP (ref 3.5–5)
POTASSIUM SERPL-MCNC: 4.6 MMOL/L — SIGNIFICANT CHANGE UP (ref 3.5–5)
POTASSIUM SERPL-SCNC: 4.6 MMOL/L — SIGNIFICANT CHANGE UP (ref 3.5–5)
POTASSIUM SERPL-SCNC: 4.6 MMOL/L — SIGNIFICANT CHANGE UP (ref 3.5–5)
RBC # BLD: 4.85 M/UL — SIGNIFICANT CHANGE UP (ref 4.2–5.4)
RBC # BLD: 4.85 M/UL — SIGNIFICANT CHANGE UP (ref 4.2–5.4)
RBC # FLD: 14.5 % — SIGNIFICANT CHANGE UP (ref 11.5–14.5)
RBC # FLD: 14.5 % — SIGNIFICANT CHANGE UP (ref 11.5–14.5)
SODIUM SERPL-SCNC: 138 MMOL/L — SIGNIFICANT CHANGE UP (ref 135–146)
SODIUM SERPL-SCNC: 138 MMOL/L — SIGNIFICANT CHANGE UP (ref 135–146)
WBC # BLD: 9.22 K/UL — SIGNIFICANT CHANGE UP (ref 4.8–10.8)
WBC # BLD: 9.22 K/UL — SIGNIFICANT CHANGE UP (ref 4.8–10.8)
WBC # FLD AUTO: 9.22 K/UL — SIGNIFICANT CHANGE UP (ref 4.8–10.8)
WBC # FLD AUTO: 9.22 K/UL — SIGNIFICANT CHANGE UP (ref 4.8–10.8)

## 2023-12-05 PROCEDURE — 99291 CRITICAL CARE FIRST HOUR: CPT

## 2023-12-05 PROCEDURE — 71045 X-RAY EXAM CHEST 1 VIEW: CPT | Mod: 26

## 2023-12-05 RX ORDER — PANTOPRAZOLE SODIUM 20 MG/1
40 TABLET, DELAYED RELEASE ORAL
Refills: 0 | Status: DISCONTINUED | OUTPATIENT
Start: 2023-12-05 | End: 2023-12-11

## 2023-12-05 RX ADMIN — CHLORHEXIDINE GLUCONATE 1 APPLICATION(S): 213 SOLUTION TOPICAL at 05:19

## 2023-12-05 RX ADMIN — Medication 1 DROP(S): at 10:00

## 2023-12-05 RX ADMIN — PANTOPRAZOLE SODIUM 40 MILLIGRAM(S): 20 TABLET, DELAYED RELEASE ORAL at 11:49

## 2023-12-05 RX ADMIN — Medication 1 DROP(S): at 18:15

## 2023-12-05 RX ADMIN — OLANZAPINE 2.5 MILLIGRAM(S): 15 TABLET, FILM COATED ORAL at 11:47

## 2023-12-05 RX ADMIN — Medication 3 MILLILITER(S): at 01:06

## 2023-12-05 RX ADMIN — Medication 40 MILLIGRAM(S): at 05:19

## 2023-12-05 RX ADMIN — ENOXAPARIN SODIUM 40 MILLIGRAM(S): 100 INJECTION SUBCUTANEOUS at 11:47

## 2023-12-05 RX ADMIN — Medication 3 MILLILITER(S): at 20:23

## 2023-12-05 RX ADMIN — Medication 40 MILLIGRAM(S): at 18:14

## 2023-12-05 RX ADMIN — Medication 3 MILLILITER(S): at 14:13

## 2023-12-05 RX ADMIN — Medication 3 MILLILITER(S): at 10:06

## 2023-12-05 NOTE — CHART NOTE - NSCHARTNOTEFT_GEN_A_CORE
HPI  Case of 63-year-old Female with PMHx of HLD, breast cancer in remission 2016, and severe asthma managed by Dr. Peña with hx of intubation for asthma exacerbation who presents to the ED with complaints of SOB.  SOB is of 3 days duration, has been progressively worsening and associated with chest pressure, sore throat, nasal congestion, and green productive cough that began two days ago and has since worsened.  Patient reports she has done 3 albuterol nebs and started on  PO steroids with no relief.   Denies fever, nausea, vomiting, abdominal pain, calf pain/swelling, hemoptysis, hx of DVT/PE, sick contacts or recent travel.  In the ED, vitals were stable  Labs wbc 11k and RSV positive  CXR no focal infiltrates   Given rocephin and azithro, solumedrol, duonebs and started on bipap  The pt has a history of being intubated for asthma and was admitted to the ICU for the management of an acute severe exacerbation of asthma due to RSV.    ICU course  The pt was treated with duonebs q1 initially and then was slowly weaned to duonebs q6 and PRN. She was additionally treated with High dose steroids and was weaned to solumedrol 40bid. The pt was initially placed on bipap continously and now uses only HFNC  40/40 and CPAP at 6 at night. Mild wheezing and rhonchi are still present but has improved since admission.  The pt is stable for downgrade to the SDU.    24H events:    Patient is a 63y old Female who presents with a chief complaint of   Primary diagnosis of Asthma exacerbation       Today is hospital day 5d.      PAST MEDICAL & SURGICAL HISTORY  Asthma    Breast cancer      SOCIAL HISTORY:  Negative for smoking/alcohol/drug use.     ALLERGIES:  contrast media (iodine-based) (Other (U))    MEDICATIONS:  STANDING MEDICATIONS  albuterol/ipratropium for Nebulization 3 milliLiter(s) Nebulizer every 6 hours  chlorhexidine 2% Cloths 1 Application(s) Topical <User Schedule>  enoxaparin Injectable 40 milliGRAM(s) SubCutaneous every 24 hours  methylPREDNISolone sodium succinate Injectable 40 milliGRAM(s) IV Push two times a day  OLANZapine 2.5 milliGRAM(s) Oral daily  pantoprazole    Tablet 40 milliGRAM(s) Oral before breakfast    PRN MEDICATIONS  albuterol/ipratropium for Nebulization 3 milliLiter(s) Nebulizer every 4 hours PRN  artificial  tears Solution 1 Drop(s) Both EYES three times a day PRN  diphenhydrAMINE Injectable 25 milliGRAM(s) IV Push at bedtime PRN  Guaifenesin/codeine 100mg/10mg per 5ml 10 milliLiter(s) 10 milliLiter(s) Oral every 6 hours PRN    VITALS:   T(F): 96.8  HR: 77  BP: 121/70  RR: 24  SpO2: 95%    LABS:                        14.4   9.22  )-----------( 315      ( 05 Dec 2023 05:50 )             43.3     12-05    138  |  101  |  29<H>  ----------------------------<  136<H>  4.6   |  30  |  0.5<L>    Ca    8.7      05 Dec 2023 05:50  Mg     2.6     12-05    TPro  6.5  /  Alb  4.0  /  TBili  0.3  /  DBili  x   /  AST  21  /  ALT  19  /  AlkPhos  69  12-04      Urinalysis Basic - ( 05 Dec 2023 05:50 )    Color: x / Appearance: x / SG: x / pH: x  Gluc: 136 mg/dL / Ketone: x  / Bili: x / Urobili: x   Blood: x / Protein: x / Nitrite: x   Leuk Esterase: x / RBC: x / WBC x   Sq Epi: x / Non Sq Epi: x / Bacteria: x                RADIOLOGY:    PHYSICAL EXAM:  GENERAL: NAD  NECK: Supple, No JVD, Normal thyroid  NERVOUS SYSTEM:  Alert & Oriented X3, Good concentration  CHEST/LUNG: Mild wheezing and rhonchi present  HEART: Regular rate and rhythm; No murmurs, rubs, or gallops  ABDOMEN: Soft, Nontender, Nondistended  EXTREMITIES:  + Peripheral Pulses      Assessment  #Acute severe asthma exacerbation due to RSV infection  #Hx of intubation  #Mild leukocytosis  - CXR no infiltrates  - continue alternating between CPAP and HFNC  - ABG grossly unremarkable   - Solumedrol 40bid  - Duonebs Q6 and PRN  -Low threshold for intubation  -D-dimer <150  -C/W DASH diet    #DLD  #Breast cancer  - Can resume PO meds    #DVT prophylaxis: lovenox 40mg  #GI prophylaxis: PO panto  #Activity: IAT

## 2023-12-05 NOTE — PROGRESS NOTE ADULT - ATTENDING COMMENTS
IMPRESSION:    Acute hypoxemic respiratory failure, improving   Asthma  exacerbation   RSV Infection   HO Severe asthma     Plan as outlined above
IMPRESSION:    Acute hypoxemic respiratory failure  Asthma exacerbation   RSV Infection   HO Severe asthma     Plan as outlined above

## 2023-12-05 NOTE — PROGRESS NOTE ADULT - SUBJECTIVE AND OBJECTIVE BOX
Patient is a 63y old  Female who presents with a chief complaint of       Over Night Events: tolerated CPAP overnight, remains on HFNC 40L40%, desated on NC, IV-locked       Vital Signs Last 24 Hrs  T(C): 36 (05 Dec 2023 08:00), Max: 36.8 (05 Dec 2023 00:00)  T(F): 96.8 (05 Dec 2023 08:00), Max: 98.2 (05 Dec 2023 00:00)  HR: 74 (05 Dec 2023 09:00) (59 - 112)  BP: 123/66 (05 Dec 2023 09:00) (87/54 - 142/77)  BP(mean): 90 (05 Dec 2023 09:00) (66 - 107)  RR: 25 (05 Dec 2023 09:00) (14 - 36)  SpO2: 95% (05 Dec 2023 09:00) (92% - 100%)    O2 Parameters below as of 05 Dec 2023 09:00  Patient On (Oxygen Delivery Method): nasal cannula, high flow  O2 Flow (L/min): 40  O2 Concentration (%): 40        CONSTITUTIONAL:   Ill appearing.  NAD    ENT:   Airway patent,   Mouth with normal mucosa.   No thrush    EYES:   Pupils equal,   Round and reactive to light.    CARDIAC:   Normal rate,   Regular rhythm.    No edema    RESPIRATORY:   Bilateral wheezing  Normal chest expansion  Not tachypneic,  No use of accessory muscles    GASTROINTESTINAL:  Abdomen soft,   Non-tender,   No guarding,   + BS    MUSCULOSKELETAL:   Range of motion is not limited    NEUROLOGICAL:   Alert and oriented       SKIN:   Skin normal color for race,   Warm and dry  No evidence of rash.    PSYCHIATRIC:   No apparent risk to self or others.          12-04-23 @ 07:01  -  12-05-23 @ 07:00  --------------------------------------------------------  IN:    Lactated Ringers: 150 mL    Oral Fluid: 360 mL  Total IN: 510 mL    OUT:    Voided (mL): 1700 mL  Total OUT: 1700 mL    Total NET: -1190 mL          LABS:                            14.4   9.22  )-----------( 315      ( 05 Dec 2023 05:50 )             43.3                                               12-05    138  |  101  |  29<H>  ----------------------------<  136<H>  4.6   |  30  |  0.5<L>    Ca    8.7      05 Dec 2023 05:50  Mg     2.6     12-05    TPro  6.5  /  Alb  4.0  /  TBili  0.3  /  DBili  x   /  AST  21  /  ALT  19  /  AlkPhos  69  12-04                                             Urinalysis Basic - ( 05 Dec 2023 05:50 )    Color: x / Appearance: x / SG: x / pH: x  Gluc: 136 mg/dL / Ketone: x  / Bili: x / Urobili: x   Blood: x / Protein: x / Nitrite: x   Leuk Esterase: x / RBC: x / WBC x   Sq Epi: x / Non Sq Epi: x / Bacteria: x                                                  LIVER FUNCTIONS - ( 04 Dec 2023 05:56 )  Alb: 4.0 g/dL / Pro: 6.5 g/dL / ALK PHOS: 69 U/L / ALT: 19 U/L / AST: 21 U/L / GGT: x                                                                                                                                       MEDICATIONS  (STANDING):  albuterol/ipratropium for Nebulization 3 milliLiter(s) Nebulizer every 6 hours  chlorhexidine 2% Cloths 1 Application(s) Topical <User Schedule>  enoxaparin Injectable 40 milliGRAM(s) SubCutaneous every 24 hours  methylPREDNISolone sodium succinate Injectable 40 milliGRAM(s) IV Push two times a day  OLANZapine 2.5 milliGRAM(s) Oral daily  pantoprazole  Injectable 40 milliGRAM(s) IV Push daily    MEDICATIONS  (PRN):  albuterol/ipratropium for Nebulization 3 milliLiter(s) Nebulizer every 4 hours PRN Shortness of Breath  artificial  tears Solution 1 Drop(s) Both EYES three times a day PRN Dry Eyes  diphenhydrAMINE Injectable 25 milliGRAM(s) IV Push at bedtime PRN Insomnia  Guaifenesin/codeine 100mg/10mg per 5ml 10 milliLiter(s) 10 milliLiter(s) Oral every 6 hours PRN cough      CXR reviewed      Patient is a 63y old  Female who presents with a chief complaint of       Over Night Events: tolerated CPAP overnight.  Remains on HFNC 40L40%.        Vital Signs Last 24 Hrs  T(C): 36 (05 Dec 2023 08:00), Max: 36.8 (05 Dec 2023 00:00)  T(F): 96.8 (05 Dec 2023 08:00), Max: 98.2 (05 Dec 2023 00:00)  HR: 74 (05 Dec 2023 09:00) (59 - 112)  BP: 123/66 (05 Dec 2023 09:00) (87/54 - 142/77)  BP(mean): 90 (05 Dec 2023 09:00) (66 - 107)  RR: 25 (05 Dec 2023 09:00) (14 - 36)  SpO2: 95% (05 Dec 2023 09:00) (92% - 100%)    O2 Parameters below as of 05 Dec 2023 09:00  Patient On (Oxygen Delivery Method): nasal cannula, high flow  O2 Flow (L/min): 40  O2 Concentration (%): 40        CONSTITUTIONAL:  In  NAD    ENT:   Airway patent,   Mouth with normal mucosa.   No thrush    EYES:   Pupils equal,   Round and reactive to light.    CARDIAC:   Normal rate,   Regular rhythm.    No edema    RESPIRATORY:   Bilateral wheezing  Normal chest expansion  Not tachypneic,  No use of accessory muscles    GASTROINTESTINAL:  Abdomen soft,   Non-tender,   No guarding,   + BS    MUSCULOSKELETAL:   Range of motion is not limited    NEUROLOGICAL:   Alert and oriented       SKIN:   Skin normal color for race,   Warm and dry  No evidence of rash.    PSYCHIATRIC:   No apparent risk to self or others.          12-04-23 @ 07:01  -  12-05-23 @ 07:00  --------------------------------------------------------  IN:    Lactated Ringers: 150 mL    Oral Fluid: 360 mL  Total IN: 510 mL    OUT:    Voided (mL): 1700 mL  Total OUT: 1700 mL    Total NET: -1190 mL          LABS:                            14.4   9.22  )-----------( 315      ( 05 Dec 2023 05:50 )             43.3                                               12-05    138  |  101  |  29<H>  ----------------------------<  136<H>  4.6   |  30  |  0.5<L>    Ca    8.7      05 Dec 2023 05:50  Mg     2.6     12-05    TPro  6.5  /  Alb  4.0  /  TBili  0.3  /  DBili  x   /  AST  21  /  ALT  19  /  AlkPhos  69  12-04                                             Urinalysis Basic - ( 05 Dec 2023 05:50 )    Color: x / Appearance: x / SG: x / pH: x  Gluc: 136 mg/dL / Ketone: x  / Bili: x / Urobili: x   Blood: x / Protein: x / Nitrite: x   Leuk Esterase: x / RBC: x / WBC x   Sq Epi: x / Non Sq Epi: x / Bacteria: x                                                  LIVER FUNCTIONS - ( 04 Dec 2023 05:56 )  Alb: 4.0 g/dL / Pro: 6.5 g/dL / ALK PHOS: 69 U/L / ALT: 19 U/L / AST: 21 U/L / GGT: x                                                                                                                                       MEDICATIONS  (STANDING):  albuterol/ipratropium for Nebulization 3 milliLiter(s) Nebulizer every 6 hours  chlorhexidine 2% Cloths 1 Application(s) Topical <User Schedule>  enoxaparin Injectable 40 milliGRAM(s) SubCutaneous every 24 hours  methylPREDNISolone sodium succinate Injectable 40 milliGRAM(s) IV Push two times a day  OLANZapine 2.5 milliGRAM(s) Oral daily  pantoprazole  Injectable 40 milliGRAM(s) IV Push daily    MEDICATIONS  (PRN):  albuterol/ipratropium for Nebulization 3 milliLiter(s) Nebulizer every 4 hours PRN Shortness of Breath  artificial  tears Solution 1 Drop(s) Both EYES three times a day PRN Dry Eyes  diphenhydrAMINE Injectable 25 milliGRAM(s) IV Push at bedtime PRN Insomnia  Guaifenesin/codeine 100mg/10mg per 5ml 10 milliLiter(s) 10 milliLiter(s) Oral every 6 hours PRN cough      CXR reviewed

## 2023-12-05 NOTE — PROGRESS NOTE ADULT - ASSESSMENT
IMPRESSION:    Acute hypoxemic respiratory failure, improving   Asthma exacerbation   RSV Infection   HO Severe asthma     PLAN:    CNS:  mental status at baseline, Zyprexa 2.5mg Q24H     HEENT: Oral care    PULMONARY:  HOB @ 45 degrees.  Aspiration precautions. Continue Solumedrol 40mg to BID.  Continue Nebs Q6H and PRN.  NIV QHS and PRN, alternate HFNC with NC, incentive spirometry     CARDIOVASCULAR:  2D-ECHO EF 73%, avoid volume overload    GI: GI prophylaxis.  Feeding off NIV.  Bowel regimen     RENAL:  Follow up lytes.  Correct as needed    INFECTIOUS DISEASE: Follow up cultures, monitoring off ABX Procal 0.1     HEMATOLOGICAL:  DVT prophylaxis.  Dimer negative    ENDOCRINE:  Follow up FS.  Insulin protocol if needed.    MUSCULOSKELETAL:  OOBTC    Downgrade to SDU  IMPRESSION:    Acute hypoxemic respiratory failure  Asthma exacerbation   RSV Infection   HO Severe asthma     PLAN:    CNS:  mental status at baseline, Zyprexa 2.5mg Q24H     HEENT: Oral care    PULMONARY:  HOB @ 45 degrees.  Aspiration precautions. Continue Solumedrol 40mg to BID.  Continue Nebs Q6H and PRN.  NIV QHS and PRN.  Wean O2 as tolerated.       CARDIOVASCULAR:  2D-ECHO EF 73%, avoid volume overload    GI: GI prophylaxis.  Feeding off NIV.  Bowel regimen     RENAL:  Follow up lytes.  Correct as needed    INFECTIOUS DISEASE: Follow up cultures, monitoring off ABX Procal 0.1     HEMATOLOGICAL:  DVT prophylaxis.  Dimer negative    ENDOCRINE:  Follow up FS.  Insulin protocol if needed.    MUSCULOSKELETAL:  OOBTC    Downgrade to SDU

## 2023-12-06 DIAGNOSIS — E78.5 HYPERLIPIDEMIA, UNSPECIFIED: ICD-10-CM

## 2023-12-06 DIAGNOSIS — C50.919 MALIGNANT NEOPLASM OF UNSPECIFIED SITE OF UNSPECIFIED FEMALE BREAST: ICD-10-CM

## 2023-12-06 DIAGNOSIS — J96.01 ACUTE RESPIRATORY FAILURE WITH HYPOXIA: ICD-10-CM

## 2023-12-06 DIAGNOSIS — Z79.899 OTHER LONG TERM (CURRENT) DRUG THERAPY: ICD-10-CM

## 2023-12-06 LAB
ALBUMIN SERPL ELPH-MCNC: 3.6 G/DL — SIGNIFICANT CHANGE UP (ref 3.5–5.2)
ALBUMIN SERPL ELPH-MCNC: 3.6 G/DL — SIGNIFICANT CHANGE UP (ref 3.5–5.2)
ALP SERPL-CCNC: 64 U/L — SIGNIFICANT CHANGE UP (ref 30–115)
ALP SERPL-CCNC: 64 U/L — SIGNIFICANT CHANGE UP (ref 30–115)
ALT FLD-CCNC: 26 U/L — SIGNIFICANT CHANGE UP (ref 0–41)
ALT FLD-CCNC: 26 U/L — SIGNIFICANT CHANGE UP (ref 0–41)
ANION GAP SERPL CALC-SCNC: 11 MMOL/L — SIGNIFICANT CHANGE UP (ref 7–14)
ANION GAP SERPL CALC-SCNC: 11 MMOL/L — SIGNIFICANT CHANGE UP (ref 7–14)
AST SERPL-CCNC: 24 U/L — SIGNIFICANT CHANGE UP (ref 0–41)
AST SERPL-CCNC: 24 U/L — SIGNIFICANT CHANGE UP (ref 0–41)
BASOPHILS # BLD AUTO: 0.02 K/UL — SIGNIFICANT CHANGE UP (ref 0–0.2)
BASOPHILS # BLD AUTO: 0.02 K/UL — SIGNIFICANT CHANGE UP (ref 0–0.2)
BASOPHILS NFR BLD AUTO: 0.3 % — SIGNIFICANT CHANGE UP (ref 0–1)
BASOPHILS NFR BLD AUTO: 0.3 % — SIGNIFICANT CHANGE UP (ref 0–1)
BILIRUB SERPL-MCNC: 0.4 MG/DL — SIGNIFICANT CHANGE UP (ref 0.2–1.2)
BILIRUB SERPL-MCNC: 0.4 MG/DL — SIGNIFICANT CHANGE UP (ref 0.2–1.2)
BUN SERPL-MCNC: 26 MG/DL — HIGH (ref 10–20)
BUN SERPL-MCNC: 26 MG/DL — HIGH (ref 10–20)
CALCIUM SERPL-MCNC: 8.7 MG/DL — SIGNIFICANT CHANGE UP (ref 8.4–10.5)
CALCIUM SERPL-MCNC: 8.7 MG/DL — SIGNIFICANT CHANGE UP (ref 8.4–10.5)
CHLORIDE SERPL-SCNC: 101 MMOL/L — SIGNIFICANT CHANGE UP (ref 98–110)
CHLORIDE SERPL-SCNC: 101 MMOL/L — SIGNIFICANT CHANGE UP (ref 98–110)
CO2 SERPL-SCNC: 26 MMOL/L — SIGNIFICANT CHANGE UP (ref 17–32)
CO2 SERPL-SCNC: 26 MMOL/L — SIGNIFICANT CHANGE UP (ref 17–32)
CREAT SERPL-MCNC: 0.6 MG/DL — LOW (ref 0.7–1.5)
CREAT SERPL-MCNC: 0.6 MG/DL — LOW (ref 0.7–1.5)
CULTURE RESULTS: SIGNIFICANT CHANGE UP
EGFR: 100 ML/MIN/1.73M2 — SIGNIFICANT CHANGE UP
EGFR: 100 ML/MIN/1.73M2 — SIGNIFICANT CHANGE UP
EOSINOPHIL # BLD AUTO: 0.03 K/UL — SIGNIFICANT CHANGE UP (ref 0–0.7)
EOSINOPHIL # BLD AUTO: 0.03 K/UL — SIGNIFICANT CHANGE UP (ref 0–0.7)
EOSINOPHIL NFR BLD AUTO: 0.4 % — SIGNIFICANT CHANGE UP (ref 0–8)
EOSINOPHIL NFR BLD AUTO: 0.4 % — SIGNIFICANT CHANGE UP (ref 0–8)
GLUCOSE SERPL-MCNC: 113 MG/DL — HIGH (ref 70–99)
GLUCOSE SERPL-MCNC: 113 MG/DL — HIGH (ref 70–99)
HCT VFR BLD CALC: 47.7 % — HIGH (ref 37–47)
HCT VFR BLD CALC: 47.7 % — HIGH (ref 37–47)
HGB BLD-MCNC: 15.7 G/DL — SIGNIFICANT CHANGE UP (ref 12–16)
HGB BLD-MCNC: 15.7 G/DL — SIGNIFICANT CHANGE UP (ref 12–16)
IMM GRANULOCYTES NFR BLD AUTO: 1 % — HIGH (ref 0.1–0.3)
IMM GRANULOCYTES NFR BLD AUTO: 1 % — HIGH (ref 0.1–0.3)
LYMPHOCYTES # BLD AUTO: 1.8 K/UL — SIGNIFICANT CHANGE UP (ref 1.2–3.4)
LYMPHOCYTES # BLD AUTO: 1.8 K/UL — SIGNIFICANT CHANGE UP (ref 1.2–3.4)
LYMPHOCYTES # BLD AUTO: 22.7 % — SIGNIFICANT CHANGE UP (ref 20.5–51.1)
LYMPHOCYTES # BLD AUTO: 22.7 % — SIGNIFICANT CHANGE UP (ref 20.5–51.1)
MCHC RBC-ENTMCNC: 29.5 PG — SIGNIFICANT CHANGE UP (ref 27–31)
MCHC RBC-ENTMCNC: 29.5 PG — SIGNIFICANT CHANGE UP (ref 27–31)
MCHC RBC-ENTMCNC: 32.9 G/DL — SIGNIFICANT CHANGE UP (ref 32–37)
MCHC RBC-ENTMCNC: 32.9 G/DL — SIGNIFICANT CHANGE UP (ref 32–37)
MCV RBC AUTO: 89.7 FL — SIGNIFICANT CHANGE UP (ref 81–99)
MCV RBC AUTO: 89.7 FL — SIGNIFICANT CHANGE UP (ref 81–99)
MONOCYTES # BLD AUTO: 0.84 K/UL — HIGH (ref 0.1–0.6)
MONOCYTES # BLD AUTO: 0.84 K/UL — HIGH (ref 0.1–0.6)
MONOCYTES NFR BLD AUTO: 10.6 % — HIGH (ref 1.7–9.3)
MONOCYTES NFR BLD AUTO: 10.6 % — HIGH (ref 1.7–9.3)
NEUTROPHILS # BLD AUTO: 5.16 K/UL — SIGNIFICANT CHANGE UP (ref 1.4–6.5)
NEUTROPHILS # BLD AUTO: 5.16 K/UL — SIGNIFICANT CHANGE UP (ref 1.4–6.5)
NEUTROPHILS NFR BLD AUTO: 65 % — SIGNIFICANT CHANGE UP (ref 42.2–75.2)
NEUTROPHILS NFR BLD AUTO: 65 % — SIGNIFICANT CHANGE UP (ref 42.2–75.2)
NRBC # BLD: 0 /100 WBCS — SIGNIFICANT CHANGE UP (ref 0–0)
NRBC # BLD: 0 /100 WBCS — SIGNIFICANT CHANGE UP (ref 0–0)
PLATELET # BLD AUTO: 299 K/UL — SIGNIFICANT CHANGE UP (ref 130–400)
PLATELET # BLD AUTO: 299 K/UL — SIGNIFICANT CHANGE UP (ref 130–400)
PMV BLD: 9.6 FL — SIGNIFICANT CHANGE UP (ref 7.4–10.4)
PMV BLD: 9.6 FL — SIGNIFICANT CHANGE UP (ref 7.4–10.4)
POTASSIUM SERPL-MCNC: 4.9 MMOL/L — SIGNIFICANT CHANGE UP (ref 3.5–5)
POTASSIUM SERPL-MCNC: 4.9 MMOL/L — SIGNIFICANT CHANGE UP (ref 3.5–5)
POTASSIUM SERPL-SCNC: 4.9 MMOL/L — SIGNIFICANT CHANGE UP (ref 3.5–5)
POTASSIUM SERPL-SCNC: 4.9 MMOL/L — SIGNIFICANT CHANGE UP (ref 3.5–5)
PROT SERPL-MCNC: 6.4 G/DL — SIGNIFICANT CHANGE UP (ref 6–8)
PROT SERPL-MCNC: 6.4 G/DL — SIGNIFICANT CHANGE UP (ref 6–8)
RBC # BLD: 5.32 M/UL — SIGNIFICANT CHANGE UP (ref 4.2–5.4)
RBC # BLD: 5.32 M/UL — SIGNIFICANT CHANGE UP (ref 4.2–5.4)
RBC # FLD: 14.6 % — HIGH (ref 11.5–14.5)
RBC # FLD: 14.6 % — HIGH (ref 11.5–14.5)
SODIUM SERPL-SCNC: 138 MMOL/L — SIGNIFICANT CHANGE UP (ref 135–146)
SODIUM SERPL-SCNC: 138 MMOL/L — SIGNIFICANT CHANGE UP (ref 135–146)
SPECIMEN SOURCE: SIGNIFICANT CHANGE UP
WBC # BLD: 7.93 K/UL — SIGNIFICANT CHANGE UP (ref 4.8–10.8)
WBC # BLD: 7.93 K/UL — SIGNIFICANT CHANGE UP (ref 4.8–10.8)
WBC # FLD AUTO: 7.93 K/UL — SIGNIFICANT CHANGE UP (ref 4.8–10.8)
WBC # FLD AUTO: 7.93 K/UL — SIGNIFICANT CHANGE UP (ref 4.8–10.8)

## 2023-12-06 PROCEDURE — 71045 X-RAY EXAM CHEST 1 VIEW: CPT | Mod: 26

## 2023-12-06 PROCEDURE — 99291 CRITICAL CARE FIRST HOUR: CPT

## 2023-12-06 PROCEDURE — 99233 SBSQ HOSP IP/OBS HIGH 50: CPT

## 2023-12-06 RX ORDER — ANASTROZOLE 1 MG/1
1 TABLET ORAL DAILY
Refills: 0 | Status: DISCONTINUED | OUTPATIENT
Start: 2023-12-06 | End: 2023-12-11

## 2023-12-06 RX ORDER — SIMVASTATIN 20 MG/1
20 TABLET, FILM COATED ORAL AT BEDTIME
Refills: 0 | Status: DISCONTINUED | OUTPATIENT
Start: 2023-12-06 | End: 2023-12-11

## 2023-12-06 RX ORDER — BUDESONIDE AND FORMOTEROL FUMARATE DIHYDRATE 160; 4.5 UG/1; UG/1
2 AEROSOL RESPIRATORY (INHALATION)
Refills: 0 | Status: DISCONTINUED | OUTPATIENT
Start: 2023-12-06 | End: 2023-12-11

## 2023-12-06 RX ORDER — MONTELUKAST 4 MG/1
0 TABLET, CHEWABLE ORAL
Qty: 0 | Refills: 0 | DISCHARGE

## 2023-12-06 RX ORDER — MONTELUKAST 4 MG/1
10 TABLET, CHEWABLE ORAL DAILY
Refills: 0 | Status: DISCONTINUED | OUTPATIENT
Start: 2023-12-06 | End: 2023-12-11

## 2023-12-06 RX ORDER — LANOLIN ALCOHOL/MO/W.PET/CERES
3 CREAM (GRAM) TOPICAL AT BEDTIME
Refills: 0 | Status: DISCONTINUED | OUTPATIENT
Start: 2023-12-06 | End: 2023-12-11

## 2023-12-06 RX ADMIN — ENOXAPARIN SODIUM 40 MILLIGRAM(S): 100 INJECTION SUBCUTANEOUS at 12:00

## 2023-12-06 RX ADMIN — OLANZAPINE 2.5 MILLIGRAM(S): 15 TABLET, FILM COATED ORAL at 12:00

## 2023-12-06 RX ADMIN — SIMVASTATIN 20 MILLIGRAM(S): 20 TABLET, FILM COATED ORAL at 21:18

## 2023-12-06 RX ADMIN — PANTOPRAZOLE SODIUM 40 MILLIGRAM(S): 20 TABLET, DELAYED RELEASE ORAL at 05:12

## 2023-12-06 RX ADMIN — Medication 40 MILLIGRAM(S): at 05:11

## 2023-12-06 RX ADMIN — ANASTROZOLE 1 MILLIGRAM(S): 1 TABLET ORAL at 17:49

## 2023-12-06 RX ADMIN — BUDESONIDE AND FORMOTEROL FUMARATE DIHYDRATE 2 PUFF(S): 160; 4.5 AEROSOL RESPIRATORY (INHALATION) at 17:51

## 2023-12-06 RX ADMIN — Medication 3 MILLILITER(S): at 14:20

## 2023-12-06 RX ADMIN — Medication 40 MILLIGRAM(S): at 17:50

## 2023-12-06 RX ADMIN — Medication 3 MILLILITER(S): at 10:29

## 2023-12-06 RX ADMIN — Medication 3 MILLILITER(S): at 19:43

## 2023-12-06 RX ADMIN — MONTELUKAST 10 MILLIGRAM(S): 4 TABLET, CHEWABLE ORAL at 17:48

## 2023-12-06 RX ADMIN — CHLORHEXIDINE GLUCONATE 1 APPLICATION(S): 213 SOLUTION TOPICAL at 05:11

## 2023-12-06 RX ADMIN — Medication 1 DROP(S): at 17:51

## 2023-12-06 NOTE — CHART NOTE - NSCHARTNOTEFT_GEN_A_CORE
[facility] and updated[facility]   - Trazodone 50mg QHS PRN  - Anastrozole 1mg qd  - simvastatin 20mg qHS  - albuterol inhaler PRN  - Singulair qd  - Advair 250-50 bid  - Montelukast 10 qd  - Spiriva 18 qd  - Nucala (mepolizumab) once a month on the 27th ( she missed November's dose because she wasn't feeling well) [facility] and updated.   - Trazodone 50mg QHS PRN  - Anastrozole 1mg qd  - simvastatin 20mg qHS  - albuterol inhaler PRN  - Singulair qd  - Advair 250-50 bid  - Montelukast 10 qd  - Spiriva 18 qd  - Nucala (mepolizumab) once a month on the 27th ( she missed November's dose because she wasn't feeling well)

## 2023-12-06 NOTE — PROGRESS NOTE ADULT - ASSESSMENT
IMPRESSION:    Acute hypoxemic respiratory failure improving  Asthma exacerbation   RSV Infection   HO Severe asthma     PLAN:    CNS:  Avoid CNS depressant    HEENT: Oral care    PULMONARY:  HOB @ 45 degrees.  Aspiration precautions. Continue Solumedrol 40mg to BID.  Continue Nebs Q6H and PRN.  NIV QHS and PRN.  Wean O2 as tolerated.   symbicort 2 puffs q 12    CARDIOVASCULAR:  2D-ECHO EF 73%, avoid volume overload    GI: GI prophylaxis.  Feeding off NIV.  Bowel regimen     RENAL:  Follow up lytes.  Correct as needed    INFECTIOUS DISEASE: procal noted    HEMATOLOGICAL:  DVT prophylaxis.  Dimer negative    ENDOCRINE:  Follow up FS.  Insulin protocol if needed.    MUSCULOSKELETAL:  OOBTC  SDU

## 2023-12-06 NOTE — PROGRESS NOTE ADULT - SUBJECTIVE AND OBJECTIVE BOX
Over Night Events: events noted, downgraded from MICU, on NC    PHYSICAL EXAM    ICU Vital Signs Last 24 Hrs  T(C): 36.1 (06 Dec 2023 06:15), Max: 36.1 (05 Dec 2023 16:00)  T(F): 96.9 (06 Dec 2023 06:15), Max: 97 (05 Dec 2023 16:00)  HR: 70 (06 Dec 2023 06:15) (63 - 83)  BP: 127/76 (06 Dec 2023 06:15) (93/56 - 142/77)  BP(mean): 96 (06 Dec 2023 06:15) (68 - 101)  RR: 33 (06 Dec 2023 06:15) (14 - 48)  SpO2: 96% (06 Dec 2023 06:15) (92% - 99%)    O2 Parameters below as of 06 Dec 2023 06:15  Patient On (Oxygen Delivery Method): nasal cannula  O2 Flow (L/min): 3          General: ILL looking  Lungs: dec bs both abses  Cardiovascular: Regular   Abdomen: Soft, Positive BS  Extremities: No clubbing   Skin: Warm  Neurological: Non focal       12-05-23 @ 07:01  -  12-06-23 @ 07:00  --------------------------------------------------------  IN:    Oral Fluid: 770 mL  Total IN: 770 mL    OUT:    Voided (mL): 2000 mL  Total OUT: 2000 mL    Total NET: -1230 mL          LABS:                          15.7   7.93  )-----------( 299      ( 06 Dec 2023 05:39 )             47.7                                               12-05    138  |  101  |  29<H>  ----------------------------<  136<H>  4.6   |  30  |  0.5<L>    Ca    8.7      05 Dec 2023 05:50  Mg     2.6     12-05                                               Urinalysis Basic - ( 05 Dec 2023 05:50 )    Color: x / Appearance: x / SG: x / pH: x  Gluc: 136 mg/dL / Ketone: x  / Bili: x / Urobili: x   Blood: x / Protein: x / Nitrite: x   Leuk Esterase: x / RBC: x / WBC x   Sq Epi: x / Non Sq Epi: x / Bacteria: x                                                                                                                                                                                MEDICATIONS  (STANDING):  albuterol/ipratropium for Nebulization 3 milliLiter(s) Nebulizer every 6 hours  chlorhexidine 2% Cloths 1 Application(s) Topical <User Schedule>  enoxaparin Injectable 40 milliGRAM(s) SubCutaneous every 24 hours  methylPREDNISolone sodium succinate Injectable 40 milliGRAM(s) IV Push two times a day  OLANZapine 2.5 milliGRAM(s) Oral daily  pantoprazole    Tablet 40 milliGRAM(s) Oral before breakfast    MEDICATIONS  (PRN):  albuterol/ipratropium for Nebulization 3 milliLiter(s) Nebulizer every 4 hours PRN Shortness of Breath  artificial  tears Solution 1 Drop(s) Both EYES three times a day PRN Dry Eyes  diphenhydrAMINE Injectable 25 milliGRAM(s) IV Push at bedtime PRN Insomnia  Guaifenesin/codeine 100mg/10mg per 5ml 10 milliLiter(s) 10 milliLiter(s) Oral every 6 hours PRN cough      cxr noted

## 2023-12-06 NOTE — PROGRESS NOTE ADULT - SUBJECTIVE AND OBJECTIVE BOX
INTERVAL HPI/OVERNIGHT EVENTS:    SUBJECTIVE: Patient seen and examined at bedside.     no cp, sob, abd pain, fever  no sob, orthopnea, pnd, cough    OBJECTIVE:    VITAL SIGNS:  Vital Signs Last 24 Hrs  T(C): 36.2 (06 Dec 2023 16:00), Max: 36.8 (06 Dec 2023 07:21)  T(F): 97.1 (06 Dec 2023 16:00), Max: 98.3 (06 Dec 2023 07:21)  HR: 86 (06 Dec 2023 16:00) (63 - 93)  BP: 115/79 (06 Dec 2023 16:00) (93/56 - 134/78)  BP(mean): 93 (06 Dec 2023 16:00) (68 - 101)  RR: 23 (06 Dec 2023 16:00) (14 - 48)  SpO2: 95% (06 Dec 2023 16:00) (93% - 99%)    Parameters below as of 06 Dec 2023 16:00  Patient On (Oxygen Delivery Method): nasal cannula  O2 Flow (L/min): 3        PHYSICAL EXAM:    General: NAD  HEENT: NC/AT; PERRL, clear conjunctiva  Neck: supple  Respiratory: CTA b/l  Cardiovascular: +S1/S2; RRR  Abdomen: soft, NT/ND; +BS x4  Extremities: WWP, 2+ peripheral pulses b/l; no LE edema  Skin: normal color and turgor; no rash  Neurological:    MEDICATIONS:  MEDICATIONS  (STANDING):  albuterol/ipratropium for Nebulization 3 milliLiter(s) Nebulizer every 6 hours  anastrozole 1 milliGRAM(s) Oral daily  budesonide 160 MICROgram(s)/formoterol 4.5 MICROgram(s) Inhaler 2 Puff(s) Inhalation two times a day  chlorhexidine 2% Cloths 1 Application(s) Topical <User Schedule>  enoxaparin Injectable 40 milliGRAM(s) SubCutaneous every 24 hours  methylPREDNISolone sodium succinate Injectable 40 milliGRAM(s) IV Push two times a day  montelukast 10 milliGRAM(s) Oral daily  OLANZapine 2.5 milliGRAM(s) Oral daily  pantoprazole    Tablet 40 milliGRAM(s) Oral before breakfast  simvastatin 20 milliGRAM(s) Oral at bedtime    MEDICATIONS  (PRN):  albuterol/ipratropium for Nebulization 3 milliLiter(s) Nebulizer every 4 hours PRN Shortness of Breath  artificial  tears Solution 1 Drop(s) Both EYES three times a day PRN Dry Eyes  diphenhydrAMINE Injectable 25 milliGRAM(s) IV Push at bedtime PRN Insomnia  Guaifenesin/codeine 100mg/10mg per 5ml 10 milliLiter(s) 10 milliLiter(s) Oral every 6 hours PRN cough  melatonin 3 milliGRAM(s) Oral at bedtime PRN Insomnia      ALLERGIES:  Allergies    contrast media (iodine-based) (Other (U))    Intolerances        LABS:                        15.7   7.93  )-----------( 299      ( 06 Dec 2023 05:39 )             47.7     Hemoglobin: 15.7 g/dL (12-06 @ 05:39)  Hemoglobin: 14.4 g/dL (12-05 @ 05:50)  Hemoglobin: 14.3 g/dL (12-04 @ 05:56)  Hemoglobin: 13.9 g/dL (12-03 @ 04:30)  Hemoglobin: 14.8 g/dL (12-02 @ 05:05)    CBC Full  -  ( 06 Dec 2023 05:39 )  WBC Count : 7.93 K/uL  RBC Count : 5.32 M/uL  Hemoglobin : 15.7 g/dL  Hematocrit : 47.7 %  Platelet Count - Automated : 299 K/uL  Mean Cell Volume : 89.7 fL  Mean Cell Hemoglobin : 29.5 pg  Mean Cell Hemoglobin Concentration : 32.9 g/dL  Auto Neutrophil # : 5.16 K/uL  Auto Lymphocyte # : 1.80 K/uL  Auto Monocyte # : 0.84 K/uL  Auto Eosinophil # : 0.03 K/uL  Auto Basophil # : 0.02 K/uL  Auto Neutrophil % : 65.0 %  Auto Lymphocyte % : 22.7 %  Auto Monocyte % : 10.6 %  Auto Eosinophil % : 0.4 %  Auto Basophil % : 0.3 %    12-06    138  |  101  |  26<H>  ----------------------------<  113<H>  4.9   |  26  |  0.6<L>    Ca    8.7      06 Dec 2023 05:39  Mg     2.6     12-05    TPro  6.4  /  Alb  3.6  /  TBili  0.4  /  DBili  x   /  AST  24  /  ALT  26  /  AlkPhos  64  12-06    Creatinine Trend: 0.6<--, 0.5<--, 0.6<--, 0.6<--, 0.6<--, 0.6<--  LIVER FUNCTIONS - ( 06 Dec 2023 05:39 )  Alb: 3.6 g/dL / Pro: 6.4 g/dL / ALK PHOS: 64 U/L / ALT: 26 U/L / AST: 24 U/L / GGT: x               hs Troponin:            Urinalysis Basic - ( 06 Dec 2023 05:39 )    Color: x / Appearance: x / SG: x / pH: x  Gluc: 113 mg/dL / Ketone: x  / Bili: x / Urobili: x   Blood: x / Protein: x / Nitrite: x   Leuk Esterase: x / RBC: x / WBC x   Sq Epi: x / Non Sq Epi: x / Bacteria: x      CSF:                      EKG:   MICROBIOLOGY:    IMAGING:      Labs, imaging, EKG personally reviewed    RADIOLOGY & ADDITIONAL TESTS: Reviewed.

## 2023-12-07 LAB
ALBUMIN SERPL ELPH-MCNC: 3.6 G/DL — SIGNIFICANT CHANGE UP (ref 3.5–5.2)
ALBUMIN SERPL ELPH-MCNC: 3.6 G/DL — SIGNIFICANT CHANGE UP (ref 3.5–5.2)
ALP SERPL-CCNC: 76 U/L — SIGNIFICANT CHANGE UP (ref 30–115)
ALP SERPL-CCNC: 76 U/L — SIGNIFICANT CHANGE UP (ref 30–115)
ALT FLD-CCNC: 39 U/L — SIGNIFICANT CHANGE UP (ref 0–41)
ALT FLD-CCNC: 39 U/L — SIGNIFICANT CHANGE UP (ref 0–41)
ANION GAP SERPL CALC-SCNC: 9 MMOL/L — SIGNIFICANT CHANGE UP (ref 7–14)
ANION GAP SERPL CALC-SCNC: 9 MMOL/L — SIGNIFICANT CHANGE UP (ref 7–14)
AST SERPL-CCNC: 22 U/L — SIGNIFICANT CHANGE UP (ref 0–41)
AST SERPL-CCNC: 22 U/L — SIGNIFICANT CHANGE UP (ref 0–41)
BASOPHILS # BLD AUTO: 0.04 K/UL — SIGNIFICANT CHANGE UP (ref 0–0.2)
BASOPHILS # BLD AUTO: 0.04 K/UL — SIGNIFICANT CHANGE UP (ref 0–0.2)
BASOPHILS NFR BLD AUTO: 0.4 % — SIGNIFICANT CHANGE UP (ref 0–1)
BASOPHILS NFR BLD AUTO: 0.4 % — SIGNIFICANT CHANGE UP (ref 0–1)
BILIRUB SERPL-MCNC: 0.3 MG/DL — SIGNIFICANT CHANGE UP (ref 0.2–1.2)
BILIRUB SERPL-MCNC: 0.3 MG/DL — SIGNIFICANT CHANGE UP (ref 0.2–1.2)
BUN SERPL-MCNC: 28 MG/DL — HIGH (ref 10–20)
BUN SERPL-MCNC: 28 MG/DL — HIGH (ref 10–20)
CALCIUM SERPL-MCNC: 8.8 MG/DL — SIGNIFICANT CHANGE UP (ref 8.4–10.5)
CALCIUM SERPL-MCNC: 8.8 MG/DL — SIGNIFICANT CHANGE UP (ref 8.4–10.5)
CHLORIDE SERPL-SCNC: 100 MMOL/L — SIGNIFICANT CHANGE UP (ref 98–110)
CHLORIDE SERPL-SCNC: 100 MMOL/L — SIGNIFICANT CHANGE UP (ref 98–110)
CO2 SERPL-SCNC: 28 MMOL/L — SIGNIFICANT CHANGE UP (ref 17–32)
CO2 SERPL-SCNC: 28 MMOL/L — SIGNIFICANT CHANGE UP (ref 17–32)
CREAT SERPL-MCNC: 0.6 MG/DL — LOW (ref 0.7–1.5)
CREAT SERPL-MCNC: 0.6 MG/DL — LOW (ref 0.7–1.5)
EGFR: 100 ML/MIN/1.73M2 — SIGNIFICANT CHANGE UP
EGFR: 100 ML/MIN/1.73M2 — SIGNIFICANT CHANGE UP
EOSINOPHIL # BLD AUTO: 0.12 K/UL — SIGNIFICANT CHANGE UP (ref 0–0.7)
EOSINOPHIL # BLD AUTO: 0.12 K/UL — SIGNIFICANT CHANGE UP (ref 0–0.7)
EOSINOPHIL NFR BLD AUTO: 1.1 % — SIGNIFICANT CHANGE UP (ref 0–8)
EOSINOPHIL NFR BLD AUTO: 1.1 % — SIGNIFICANT CHANGE UP (ref 0–8)
GLUCOSE SERPL-MCNC: 133 MG/DL — HIGH (ref 70–99)
GLUCOSE SERPL-MCNC: 133 MG/DL — HIGH (ref 70–99)
HCT VFR BLD CALC: 45.3 % — SIGNIFICANT CHANGE UP (ref 37–47)
HCT VFR BLD CALC: 45.3 % — SIGNIFICANT CHANGE UP (ref 37–47)
HGB BLD-MCNC: 15.1 G/DL — SIGNIFICANT CHANGE UP (ref 12–16)
HGB BLD-MCNC: 15.1 G/DL — SIGNIFICANT CHANGE UP (ref 12–16)
IMM GRANULOCYTES NFR BLD AUTO: 1.1 % — HIGH (ref 0.1–0.3)
IMM GRANULOCYTES NFR BLD AUTO: 1.1 % — HIGH (ref 0.1–0.3)
LYMPHOCYTES # BLD AUTO: 2.23 K/UL — SIGNIFICANT CHANGE UP (ref 1.2–3.4)
LYMPHOCYTES # BLD AUTO: 2.23 K/UL — SIGNIFICANT CHANGE UP (ref 1.2–3.4)
LYMPHOCYTES # BLD AUTO: 21.1 % — SIGNIFICANT CHANGE UP (ref 20.5–51.1)
LYMPHOCYTES # BLD AUTO: 21.1 % — SIGNIFICANT CHANGE UP (ref 20.5–51.1)
MAGNESIUM SERPL-MCNC: 2.2 MG/DL — SIGNIFICANT CHANGE UP (ref 1.8–2.4)
MAGNESIUM SERPL-MCNC: 2.2 MG/DL — SIGNIFICANT CHANGE UP (ref 1.8–2.4)
MCHC RBC-ENTMCNC: 29.3 PG — SIGNIFICANT CHANGE UP (ref 27–31)
MCHC RBC-ENTMCNC: 29.3 PG — SIGNIFICANT CHANGE UP (ref 27–31)
MCHC RBC-ENTMCNC: 33.3 G/DL — SIGNIFICANT CHANGE UP (ref 32–37)
MCHC RBC-ENTMCNC: 33.3 G/DL — SIGNIFICANT CHANGE UP (ref 32–37)
MCV RBC AUTO: 88 FL — SIGNIFICANT CHANGE UP (ref 81–99)
MCV RBC AUTO: 88 FL — SIGNIFICANT CHANGE UP (ref 81–99)
MONOCYTES # BLD AUTO: 0.89 K/UL — HIGH (ref 0.1–0.6)
MONOCYTES # BLD AUTO: 0.89 K/UL — HIGH (ref 0.1–0.6)
MONOCYTES NFR BLD AUTO: 8.4 % — SIGNIFICANT CHANGE UP (ref 1.7–9.3)
MONOCYTES NFR BLD AUTO: 8.4 % — SIGNIFICANT CHANGE UP (ref 1.7–9.3)
NEUTROPHILS # BLD AUTO: 7.15 K/UL — HIGH (ref 1.4–6.5)
NEUTROPHILS # BLD AUTO: 7.15 K/UL — HIGH (ref 1.4–6.5)
NEUTROPHILS NFR BLD AUTO: 67.9 % — SIGNIFICANT CHANGE UP (ref 42.2–75.2)
NEUTROPHILS NFR BLD AUTO: 67.9 % — SIGNIFICANT CHANGE UP (ref 42.2–75.2)
NRBC # BLD: 0 /100 WBCS — SIGNIFICANT CHANGE UP (ref 0–0)
NRBC # BLD: 0 /100 WBCS — SIGNIFICANT CHANGE UP (ref 0–0)
PLATELET # BLD AUTO: 312 K/UL — SIGNIFICANT CHANGE UP (ref 130–400)
PLATELET # BLD AUTO: 312 K/UL — SIGNIFICANT CHANGE UP (ref 130–400)
PMV BLD: 9.7 FL — SIGNIFICANT CHANGE UP (ref 7.4–10.4)
PMV BLD: 9.7 FL — SIGNIFICANT CHANGE UP (ref 7.4–10.4)
POTASSIUM SERPL-MCNC: 4.3 MMOL/L — SIGNIFICANT CHANGE UP (ref 3.5–5)
POTASSIUM SERPL-MCNC: 4.3 MMOL/L — SIGNIFICANT CHANGE UP (ref 3.5–5)
POTASSIUM SERPL-SCNC: 4.3 MMOL/L — SIGNIFICANT CHANGE UP (ref 3.5–5)
POTASSIUM SERPL-SCNC: 4.3 MMOL/L — SIGNIFICANT CHANGE UP (ref 3.5–5)
PROT SERPL-MCNC: 6.1 G/DL — SIGNIFICANT CHANGE UP (ref 6–8)
PROT SERPL-MCNC: 6.1 G/DL — SIGNIFICANT CHANGE UP (ref 6–8)
RBC # BLD: 5.15 M/UL — SIGNIFICANT CHANGE UP (ref 4.2–5.4)
RBC # BLD: 5.15 M/UL — SIGNIFICANT CHANGE UP (ref 4.2–5.4)
RBC # FLD: 14.5 % — SIGNIFICANT CHANGE UP (ref 11.5–14.5)
RBC # FLD: 14.5 % — SIGNIFICANT CHANGE UP (ref 11.5–14.5)
SODIUM SERPL-SCNC: 137 MMOL/L — SIGNIFICANT CHANGE UP (ref 135–146)
SODIUM SERPL-SCNC: 137 MMOL/L — SIGNIFICANT CHANGE UP (ref 135–146)
WBC # BLD: 10.55 K/UL — SIGNIFICANT CHANGE UP (ref 4.8–10.8)
WBC # BLD: 10.55 K/UL — SIGNIFICANT CHANGE UP (ref 4.8–10.8)
WBC # FLD AUTO: 10.55 K/UL — SIGNIFICANT CHANGE UP (ref 4.8–10.8)
WBC # FLD AUTO: 10.55 K/UL — SIGNIFICANT CHANGE UP (ref 4.8–10.8)

## 2023-12-07 PROCEDURE — 99233 SBSQ HOSP IP/OBS HIGH 50: CPT

## 2023-12-07 RX ADMIN — Medication 40 MILLIGRAM(S): at 17:53

## 2023-12-07 RX ADMIN — Medication 3 MILLILITER(S): at 07:41

## 2023-12-07 RX ADMIN — MONTELUKAST 10 MILLIGRAM(S): 4 TABLET, CHEWABLE ORAL at 11:29

## 2023-12-07 RX ADMIN — Medication 40 MILLIGRAM(S): at 06:04

## 2023-12-07 RX ADMIN — Medication 3 MILLILITER(S): at 13:42

## 2023-12-07 RX ADMIN — ENOXAPARIN SODIUM 40 MILLIGRAM(S): 100 INJECTION SUBCUTANEOUS at 11:30

## 2023-12-07 RX ADMIN — PANTOPRAZOLE SODIUM 40 MILLIGRAM(S): 20 TABLET, DELAYED RELEASE ORAL at 06:05

## 2023-12-07 RX ADMIN — Medication 3 MILLILITER(S): at 20:53

## 2023-12-07 RX ADMIN — ANASTROZOLE 1 MILLIGRAM(S): 1 TABLET ORAL at 11:29

## 2023-12-07 RX ADMIN — Medication 3 MILLILITER(S): at 18:07

## 2023-12-07 RX ADMIN — OLANZAPINE 2.5 MILLIGRAM(S): 15 TABLET, FILM COATED ORAL at 11:29

## 2023-12-07 RX ADMIN — CHLORHEXIDINE GLUCONATE 1 APPLICATION(S): 213 SOLUTION TOPICAL at 06:09

## 2023-12-07 RX ADMIN — BUDESONIDE AND FORMOTEROL FUMARATE DIHYDRATE 2 PUFF(S): 160; 4.5 AEROSOL RESPIRATORY (INHALATION) at 17:55

## 2023-12-07 RX ADMIN — SIMVASTATIN 20 MILLIGRAM(S): 20 TABLET, FILM COATED ORAL at 22:02

## 2023-12-07 RX ADMIN — BUDESONIDE AND FORMOTEROL FUMARATE DIHYDRATE 2 PUFF(S): 160; 4.5 AEROSOL RESPIRATORY (INHALATION) at 06:05

## 2023-12-07 NOTE — PROGRESS NOTE ADULT - ASSESSMENT
IMPRESSION:    Acute hypoxemic respiratory failure improving  Asthma exacerbation   RSV Infection   HO Severe asthma     PLAN:    CNS:  Avoid CNS depressant    HEENT: Oral care    PULMONARY:  HOB @ 45 degrees.  Aspiration precautions. Continue Solumedrol 40mg to BID.  Continue Nebs Q6H and PRN.  NIV QHS and PRN.  Wean O2 as tolerated.   symbicort 2 puffs q 12/montelukast, PF q shift    CARDIOVASCULAR:  2D-ECHO EF 73%, avoid volume overload    GI: GI prophylaxis.  Feeding off NIV.  Bowel regimen     RENAL:  Follow up lytes.  Correct as needed    INFECTIOUS DISEASE: procal noted    HEMATOLOGICAL:  DVT prophylaxis.    ENDOCRINE:  Follow up FS.  Insulin protocol if needed.    MUSCULOSKELETAL:  OOBTC  SDU

## 2023-12-07 NOTE — PROGRESS NOTE ADULT - SUBJECTIVE AND OBJECTIVE BOX
INTERVAL HPI/OVERNIGHT EVENTS:    SUBJECTIVE: Patient seen and examined at bedside.     no cp, sob, abd pain, fever  no sob, orthopnea, pnd, cough    OBJECTIVE:    VITAL SIGNS:  Vital Signs Last 24 Hrs  T(C): 36.5 (07 Dec 2023 07:00), Max: 36.7 (07 Dec 2023 00:03)  T(F): 97.7 (07 Dec 2023 07:00), Max: 98 (07 Dec 2023 00:03)  HR: 71 (07 Dec 2023 07:00) (67 - 93)  BP: 116/66 (07 Dec 2023 07:00) (92/60 - 119/75)  BP(mean): 85 (07 Dec 2023 07:00) (70 - 94)  RR: 22 (07 Dec 2023 07:00) (21 - 28)  SpO2: 95% (07 Dec 2023 07:00) (93% - 100%)    Parameters below as of 07 Dec 2023 07:00  Patient On (Oxygen Delivery Method): nasal cannula  O2 Flow (L/min): 3        PHYSICAL EXAM:    General: NAD  HEENT: NC/AT; PERRL, clear conjunctiva  Neck: supple  Respiratory: bl crackles  Cardiovascular: +S1/S2; RRR  Abdomen: soft, NT/ND; +BS x4  Extremities: WWP, 2+ peripheral pulses b/l; no LE edema  Skin: normal color and turgor; no rash  Neurological:    MEDICATIONS:  MEDICATIONS  (STANDING):  albuterol/ipratropium for Nebulization 3 milliLiter(s) Nebulizer every 6 hours  anastrozole 1 milliGRAM(s) Oral daily  budesonide 160 MICROgram(s)/formoterol 4.5 MICROgram(s) Inhaler 2 Puff(s) Inhalation two times a day  chlorhexidine 2% Cloths 1 Application(s) Topical <User Schedule>  enoxaparin Injectable 40 milliGRAM(s) SubCutaneous every 24 hours  methylPREDNISolone sodium succinate Injectable 40 milliGRAM(s) IV Push two times a day  montelukast 10 milliGRAM(s) Oral daily  OLANZapine 2.5 milliGRAM(s) Oral daily  pantoprazole    Tablet 40 milliGRAM(s) Oral before breakfast  simvastatin 20 milliGRAM(s) Oral at bedtime    MEDICATIONS  (PRN):  albuterol/ipratropium for Nebulization 3 milliLiter(s) Nebulizer every 4 hours PRN Shortness of Breath  artificial  tears Solution 1 Drop(s) Both EYES three times a day PRN Dry Eyes  diphenhydrAMINE Injectable 25 milliGRAM(s) IV Push at bedtime PRN Insomnia  Guaifenesin/codeine 100mg/10mg per 5ml 10 milliLiter(s) 10 milliLiter(s) Oral every 6 hours PRN cough  melatonin 3 milliGRAM(s) Oral at bedtime PRN Insomnia      ALLERGIES:  Allergies    contrast media (iodine-based) (Other (U))    Intolerances        LABS:                        15.1   10.55 )-----------( 312      ( 07 Dec 2023 04:40 )             45.3     Hemoglobin: 15.1 g/dL (12-07 @ 04:40)  Hemoglobin: 15.7 g/dL (12-06 @ 05:39)  Hemoglobin: 14.4 g/dL (12-05 @ 05:50)  Hemoglobin: 14.3 g/dL (12-04 @ 05:56)  Hemoglobin: 13.9 g/dL (12-03 @ 04:30)    CBC Full  -  ( 07 Dec 2023 04:40 )  WBC Count : 10.55 K/uL  RBC Count : 5.15 M/uL  Hemoglobin : 15.1 g/dL  Hematocrit : 45.3 %  Platelet Count - Automated : 312 K/uL  Mean Cell Volume : 88.0 fL  Mean Cell Hemoglobin : 29.3 pg  Mean Cell Hemoglobin Concentration : 33.3 g/dL  Auto Neutrophil # : 7.15 K/uL  Auto Lymphocyte # : 2.23 K/uL  Auto Monocyte # : 0.89 K/uL  Auto Eosinophil # : 0.12 K/uL  Auto Basophil # : 0.04 K/uL  Auto Neutrophil % : 67.9 %  Auto Lymphocyte % : 21.1 %  Auto Monocyte % : 8.4 %  Auto Eosinophil % : 1.1 %  Auto Basophil % : 0.4 %    12-07    137  |  100  |  28<H>  ----------------------------<  133<H>  4.3   |  28  |  0.6<L>    Ca    8.8      07 Dec 2023 04:40  Mg     2.2     12-07    TPro  6.1  /  Alb  3.6  /  TBili  0.3  /  DBili  x   /  AST  22  /  ALT  39  /  AlkPhos  76  12-07    Creatinine Trend: 0.6<--, 0.6<--, 0.5<--, 0.6<--, 0.6<--, 0.6<--  LIVER FUNCTIONS - ( 07 Dec 2023 04:40 )  Alb: 3.6 g/dL / Pro: 6.1 g/dL / ALK PHOS: 76 U/L / ALT: 39 U/L / AST: 22 U/L / GGT: x               hs Troponin:            Urinalysis Basic - ( 07 Dec 2023 04:40 )    Color: x / Appearance: x / SG: x / pH: x  Gluc: 133 mg/dL / Ketone: x  / Bili: x / Urobili: x   Blood: x / Protein: x / Nitrite: x   Leuk Esterase: x / RBC: x / WBC x   Sq Epi: x / Non Sq Epi: x / Bacteria: x      CSF:                      EKG:   MICROBIOLOGY:    IMAGING:      Labs, imaging, EKG personally reviewed    RADIOLOGY & ADDITIONAL TESTS: Reviewed.

## 2023-12-07 NOTE — PROGRESS NOTE ADULT - SUBJECTIVE AND OBJECTIVE BOX
Over Night Events: events noted, on NIV overnight    ICU Vital Signs Last 24 Hrs  T(C): 35.8 (07 Dec 2023 04:00), Max: 36.8 (06 Dec 2023 07:21)  T(F): 96.5 (07 Dec 2023 04:00), Max: 98.3 (06 Dec 2023 07:21)  HR: 67 (07 Dec 2023 04:00) (65 - 93)  BP: 105/65 (07 Dec 2023 04:00) (92/60 - 132/74)  BP(mean): 78 (07 Dec 2023 04:00) (70 - 98)  RR: 27 (07 Dec 2023 04:00) (16 - 28)  SpO2: 99% (07 Dec 2023 04:00) (93% - 100%)    O2 Parameters below as of 07 Dec 2023 04:00  Patient On (Oxygen Delivery Method): BiPAP/CPAP            General: ill looking  Lungs: dec bs both bases  Cardiovascular: Regular   Abdomen: Soft, Positive BS  Extremities: No clubbing   Skin: Warm  Neurological: Non focal       12-05-23 @ 07:01  -  12-06-23 @ 07:00  --------------------------------------------------------  IN:    Oral Fluid: 770 mL  Total IN: 770 mL    OUT:    Voided (mL): 2000 mL  Total OUT: 2000 mL    Total NET: -1230 mL      12-06-23 @ 07:01  -  12-07-23 @ 06:51  --------------------------------------------------------  IN:  Total IN: 0 mL    OUT:    Voided (mL): 1100 mL  Total OUT: 1100 mL    Total NET: -1100 mL          LABS:                          15.1   10.55 )-----------( 312      ( 07 Dec 2023 04:40 )             45.3                                               12-07    137  |  100  |  28<H>  ----------------------------<  133<H>  4.3   |  28  |  0.6<L>    Ca    8.8      07 Dec 2023 04:40  Mg     2.2     12-07    TPro  6.1  /  Alb  3.6  /  TBili  0.3  /  DBili  x   /  AST  22  /  ALT  39  /  AlkPhos  76  12-07                                             Urinalysis Basic - ( 07 Dec 2023 04:40 )    Color: x / Appearance: x / SG: x / pH: x  Gluc: 133 mg/dL / Ketone: x  / Bili: x / Urobili: x   Blood: x / Protein: x / Nitrite: x   Leuk Esterase: x / RBC: x / WBC x   Sq Epi: x / Non Sq Epi: x / Bacteria: x                                                  LIVER FUNCTIONS - ( 07 Dec 2023 04:40 )  Alb: 3.6 g/dL / Pro: 6.1 g/dL / ALK PHOS: 76 U/L / ALT: 39 U/L / AST: 22 U/L / GGT: x                                                                                                                                       MEDICATIONS  (STANDING):  albuterol/ipratropium for Nebulization 3 milliLiter(s) Nebulizer every 6 hours  anastrozole 1 milliGRAM(s) Oral daily  budesonide 160 MICROgram(s)/formoterol 4.5 MICROgram(s) Inhaler 2 Puff(s) Inhalation two times a day  chlorhexidine 2% Cloths 1 Application(s) Topical <User Schedule>  enoxaparin Injectable 40 milliGRAM(s) SubCutaneous every 24 hours  methylPREDNISolone sodium succinate Injectable 40 milliGRAM(s) IV Push two times a day  montelukast 10 milliGRAM(s) Oral daily  OLANZapine 2.5 milliGRAM(s) Oral daily  pantoprazole    Tablet 40 milliGRAM(s) Oral before breakfast  simvastatin 20 milliGRAM(s) Oral at bedtime    MEDICATIONS  (PRN):  albuterol/ipratropium for Nebulization 3 milliLiter(s) Nebulizer every 4 hours PRN Shortness of Breath  artificial  tears Solution 1 Drop(s) Both EYES three times a day PRN Dry Eyes  diphenhydrAMINE Injectable 25 milliGRAM(s) IV Push at bedtime PRN Insomnia  Guaifenesin/codeine 100mg/10mg per 5ml 10 milliLiter(s) 10 milliLiter(s) Oral every 6 hours PRN cough  melatonin 3 milliGRAM(s) Oral at bedtime PRN Insomnia

## 2023-12-07 NOTE — PROGRESS NOTE ADULT - ASSESSMENT
63F PMHx HLD, breast ca 2016, asthma (c/b h/o intubation) here with acute hypoxic resp failure, due to RSV.

## 2023-12-08 PROCEDURE — 99232 SBSQ HOSP IP/OBS MODERATE 35: CPT

## 2023-12-08 PROCEDURE — 99233 SBSQ HOSP IP/OBS HIGH 50: CPT

## 2023-12-08 RX ORDER — POLYETHYLENE GLYCOL 3350 17 G/17G
17 POWDER, FOR SOLUTION ORAL DAILY
Refills: 0 | Status: DISCONTINUED | OUTPATIENT
Start: 2023-12-08 | End: 2023-12-11

## 2023-12-08 RX ORDER — SENNA PLUS 8.6 MG/1
2 TABLET ORAL AT BEDTIME
Refills: 0 | Status: DISCONTINUED | OUTPATIENT
Start: 2023-12-08 | End: 2023-12-11

## 2023-12-08 RX ADMIN — Medication 3 MILLILITER(S): at 01:26

## 2023-12-08 RX ADMIN — ANASTROZOLE 1 MILLIGRAM(S): 1 TABLET ORAL at 11:32

## 2023-12-08 RX ADMIN — SIMVASTATIN 20 MILLIGRAM(S): 20 TABLET, FILM COATED ORAL at 21:39

## 2023-12-08 RX ADMIN — Medication 3 MILLILITER(S): at 20:42

## 2023-12-08 RX ADMIN — MONTELUKAST 10 MILLIGRAM(S): 4 TABLET, CHEWABLE ORAL at 11:31

## 2023-12-08 RX ADMIN — CHLORHEXIDINE GLUCONATE 1 APPLICATION(S): 213 SOLUTION TOPICAL at 06:17

## 2023-12-08 RX ADMIN — PANTOPRAZOLE SODIUM 40 MILLIGRAM(S): 20 TABLET, DELAYED RELEASE ORAL at 06:21

## 2023-12-08 RX ADMIN — OLANZAPINE 2.5 MILLIGRAM(S): 15 TABLET, FILM COATED ORAL at 11:32

## 2023-12-08 RX ADMIN — Medication 3 MILLILITER(S): at 14:09

## 2023-12-08 RX ADMIN — Medication 3 MILLILITER(S): at 10:24

## 2023-12-08 RX ADMIN — Medication 3 MILLILITER(S): at 07:27

## 2023-12-08 RX ADMIN — Medication 40 MILLIGRAM(S): at 06:17

## 2023-12-08 RX ADMIN — ENOXAPARIN SODIUM 40 MILLIGRAM(S): 100 INJECTION SUBCUTANEOUS at 11:32

## 2023-12-08 NOTE — PROGRESS NOTE ADULT - ASSESSMENT
IMPRESSION:    Acute hypoxemic respiratory failure improving  Asthma exacerbation   RSV Infection   HO Severe asthma     PLAN:    CNS:  Avoid CNS depressant    HEENT: Oral care    PULMONARY:  HOB @ 45 degrees.  Aspiration precautions.   Continue Nebs Q6H and PRN.  NIV QHS and PRN.  Wean O2 as tolerated.   symbicort 2 puffs q 12/montelukast, PF q shift, steroids taper    CARDIOVASCULAR:  2D-ECHO EF 73%, avoid volume overload    GI: GI prophylaxis.  Feeding off NIV.  Bowel regimen     RENAL:  Follow up lytes.  Correct as needed    INFECTIOUS DISEASE: procal noted    HEMATOLOGICAL:  DVT prophylaxis. LE doppler    ENDOCRINE:  Follow up FS.  Insulin protocol if needed.    MUSCULOSKELETAL:  OOBTC  floor

## 2023-12-08 NOTE — PROGRESS NOTE ADULT - NSPROGADDITIONALINFOA_GEN_ALL_CORE
#Progress Note Handoff:  Pending (specify):  Consults_________, Tests________, Test Results_______, Other_____hypoxia____  Family discussion: d/w pt at bedside  Disposition: Home___/SNF___/Other________/Unknown at this time___x_____

## 2023-12-08 NOTE — PROGRESS NOTE ADULT - SUBJECTIVE AND OBJECTIVE BOX
INTERVAL HPI/OVERNIGHT EVENTS:    SUBJECTIVE: Patient seen and examined at bedside.     no cp, abd pain, fever  sob improving, no cough, orthopnea, pnd    OBJECTIVE:    VITAL SIGNS:  Vital Signs Last 24 Hrs  T(C): 36.1 (08 Dec 2023 07:30), Max: 36.8 (07 Dec 2023 20:00)  T(F): 97 (08 Dec 2023 07:30), Max: 98.2 (07 Dec 2023 20:00)  HR: 79 (08 Dec 2023 07:30) (78 - 85)  BP: 117/78 (08 Dec 2023 07:30) (103/64 - 117/78)  BP(mean): 94 (08 Dec 2023 07:30) (79 - 94)  RR: 27 (08 Dec 2023 07:30) (15 - 27)  SpO2: 95% (08 Dec 2023 07:30) (95% - 99%)    Parameters below as of 08 Dec 2023 04:00  Patient On (Oxygen Delivery Method): nasal cannula  O2 Flow (L/min): 3        PHYSICAL EXAM:    General: NAD  HEENT: NC/AT; PERRL, clear conjunctiva  Neck: supple  Respiratory: CTA b/l  Cardiovascular: +S1/S2; RRR  Abdomen: soft, NT/ND; +BS x4  Extremities: WWP, 2+ peripheral pulses b/l; no LE edema  Skin: normal color and turgor; no rash  Neurological:    MEDICATIONS:  MEDICATIONS  (STANDING):  albuterol/ipratropium for Nebulization 3 milliLiter(s) Nebulizer every 6 hours  anastrozole 1 milliGRAM(s) Oral daily  budesonide 160 MICROgram(s)/formoterol 4.5 MICROgram(s) Inhaler 2 Puff(s) Inhalation two times a day  chlorhexidine 2% Cloths 1 Application(s) Topical <User Schedule>  enoxaparin Injectable 40 milliGRAM(s) SubCutaneous every 24 hours  montelukast 10 milliGRAM(s) Oral daily  OLANZapine 2.5 milliGRAM(s) Oral daily  pantoprazole    Tablet 40 milliGRAM(s) Oral before breakfast  predniSONE   Tablet 40 milliGRAM(s) Oral daily  predniSONE   Tablet   Oral   simvastatin 20 milliGRAM(s) Oral at bedtime    MEDICATIONS  (PRN):  albuterol/ipratropium for Nebulization 3 milliLiter(s) Nebulizer every 4 hours PRN Shortness of Breath  artificial  tears Solution 1 Drop(s) Both EYES three times a day PRN Dry Eyes  diphenhydrAMINE Injectable 25 milliGRAM(s) IV Push at bedtime PRN Insomnia  Guaifenesin/codeine 100mg/10mg per 5ml 10 milliLiter(s) 10 milliLiter(s) Oral every 6 hours PRN cough  melatonin 3 milliGRAM(s) Oral at bedtime PRN Insomnia  polyethylene glycol 3350 17 Gram(s) Oral daily PRN Constipation  senna 2 Tablet(s) Oral at bedtime PRN Constipation      ALLERGIES:  Allergies    contrast media (iodine-based) (Other (U))    Intolerances        LABS:                        15.1   10.55 )-----------( 312      ( 07 Dec 2023 04:40 )             45.3     Hemoglobin: 15.1 g/dL (12-07 @ 04:40)  Hemoglobin: 15.7 g/dL (12-06 @ 05:39)  Hemoglobin: 14.4 g/dL (12-05 @ 05:50)  Hemoglobin: 14.3 g/dL (12-04 @ 05:56)    CBC Full  -  ( 07 Dec 2023 04:40 )  WBC Count : 10.55 K/uL  RBC Count : 5.15 M/uL  Hemoglobin : 15.1 g/dL  Hematocrit : 45.3 %  Platelet Count - Automated : 312 K/uL  Mean Cell Volume : 88.0 fL  Mean Cell Hemoglobin : 29.3 pg  Mean Cell Hemoglobin Concentration : 33.3 g/dL  Auto Neutrophil # : 7.15 K/uL  Auto Lymphocyte # : 2.23 K/uL  Auto Monocyte # : 0.89 K/uL  Auto Eosinophil # : 0.12 K/uL  Auto Basophil # : 0.04 K/uL  Auto Neutrophil % : 67.9 %  Auto Lymphocyte % : 21.1 %  Auto Monocyte % : 8.4 %  Auto Eosinophil % : 1.1 %  Auto Basophil % : 0.4 %    12-07    137  |  100  |  28<H>  ----------------------------<  133<H>  4.3   |  28  |  0.6<L>    Ca    8.8      07 Dec 2023 04:40  Mg     2.2     12-07    TPro  6.1  /  Alb  3.6  /  TBili  0.3  /  DBili  x   /  AST  22  /  ALT  39  /  AlkPhos  76  12-07    Creatinine Trend: 0.6<--, 0.6<--, 0.5<--, 0.6<--, 0.6<--, 0.6<--  LIVER FUNCTIONS - ( 07 Dec 2023 04:40 )  Alb: 3.6 g/dL / Pro: 6.1 g/dL / ALK PHOS: 76 U/L / ALT: 39 U/L / AST: 22 U/L / GGT: x               hs Troponin:            Urinalysis Basic - ( 07 Dec 2023 04:40 )    Color: x / Appearance: x / SG: x / pH: x  Gluc: 133 mg/dL / Ketone: x  / Bili: x / Urobili: x   Blood: x / Protein: x / Nitrite: x   Leuk Esterase: x / RBC: x / WBC x   Sq Epi: x / Non Sq Epi: x / Bacteria: x      CSF:                      EKG:   MICROBIOLOGY:    IMAGING:      Labs, imaging, EKG personally reviewed    RADIOLOGY & ADDITIONAL TESTS: Reviewed.

## 2023-12-08 NOTE — PROGRESS NOTE ADULT - PROBLEM SELECTOR PROBLEM 4
On deep vein thrombosis (DVT) prophylaxis

## 2023-12-08 NOTE — CHART NOTE - NSCHARTNOTEFT_GEN_A_CORE
Transfer from medical SDU to medical Floor.  Sign out given to:    Hospital Course:  HPI  Case of 63-year-old Female with PMHx of HLD, breast cancer in remission 2016, and severe asthma managed by Dr. Peña with hx of intubation for asthma exacerbation who presents to the ED with complaints of SOB.  SOB is of 3 days duration, has been progressively worsening and associated with chest pressure, sore throat, nasal congestion, and green productive cough that began two days ago and has since worsened.  In the ED, vitals were stable, Labs wbc 11k and RSV positive, CXR no focal infiltrates, Given rocephin and azithro, solumedrol, duonebs and started on bipap. The pt has a history of being intubated for asthma and was admitted to the ICU for the management of an acute severe exacerbation of asthma due to RSV.    ICU course Starting Nov 30:  The pt was treated with duonebs q1 initially and then was slowly weaned to duonebs q6 and PRN. She was additionally treated with High dose steroids and was weaned to solumedrol 40bid. The pt was initially placed on bipap continously and now uses only HFNC  40/40 and CPAP at 6 at night. Mild wheezing and rhonchi are still present but has improved since admission.  The pt is stable for downgrade to the SDU on Dec 5.    SDU course starting Dec 5:  Patient was stable on 6L NC --> weaned down to 3L NC.  remained on IV solumedrol 40mg bid until 12/7 --> 12/8 started 10 day prednisone taper.  Labs and vitals stable. no other active issues.   Medrec confirmed.   Patient stable to be downgraded to floor on Dec 8.     Follow up: monitor resp status, wean off oxygen. cw prednisone taper, plan for discharge.

## 2023-12-08 NOTE — PROGRESS NOTE ADULT - SUBJECTIVE AND OBJECTIVE BOX
Over Night Events: Events noted, on NIV overnight, NC during day, afebrile    PHYSICAL EXAM    ICU Vital Signs Last 24 Hrs  T(C): 36.7 (08 Dec 2023 04:00), Max: 36.8 (07 Dec 2023 20:00)  T(F): 98.1 (08 Dec 2023 04:00), Max: 98.2 (07 Dec 2023 20:00)  HR: 78 (08 Dec 2023 04:00) (71 - 85)  BP: 114/79 (08 Dec 2023 04:00) (103/64 - 132/79)  BP(mean): 89 (08 Dec 2023 04:00) (79 - 100)  RR: 15 (08 Dec 2023 04:00) (15 - 24)  SpO2: 99% (08 Dec 2023 04:00) (95% - 99%)    O2 Parameters below as of 08 Dec 2023 04:00  Patient On (Oxygen Delivery Method): nasal cannula  O2 Flow (L/min): 3          General: ill looking  Lungs: dec bs diffusely  Cardiovascular: Regular   Abdomen: Soft, Positive BS  Extremities: No clubbing   Skin: Warm  Neurological: Non focal       12-06-23 @ 07:01  -  12-07-23 @ 07:00  --------------------------------------------------------  IN:  Total IN: 0 mL    OUT:    Voided (mL): 1100 mL  Total OUT: 1100 mL    Total NET: -1100 mL      12-07-23 @ 07:01  -  12-08-23 @ 06:53  --------------------------------------------------------  IN:  Total IN: 0 mL    OUT:    Voided (mL): 1425 mL  Total OUT: 1425 mL    Total NET: -1425 mL          LABS:                          15.1   10.55 )-----------( 312      ( 07 Dec 2023 04:40 )             45.3                                               12-07    137  |  100  |  28<H>  ----------------------------<  133<H>  4.3   |  28  |  0.6<L>    Ca    8.8      07 Dec 2023 04:40  Mg     2.2     12-07    TPro  6.1  /  Alb  3.6  /  TBili  0.3  /  DBili  x   /  AST  22  /  ALT  39  /  AlkPhos  76  12-07                                             Urinalysis Basic - ( 07 Dec 2023 04:40 )    Color: x / Appearance: x / SG: x / pH: x  Gluc: 133 mg/dL / Ketone: x  / Bili: x / Urobili: x   Blood: x / Protein: x / Nitrite: x   Leuk Esterase: x / RBC: x / WBC x   Sq Epi: x / Non Sq Epi: x / Bacteria: x                                                  LIVER FUNCTIONS - ( 07 Dec 2023 04:40 )  Alb: 3.6 g/dL / Pro: 6.1 g/dL / ALK PHOS: 76 U/L / ALT: 39 U/L / AST: 22 U/L / GGT: x                                                                                                                                       MEDICATIONS  (STANDING):  albuterol/ipratropium for Nebulization 3 milliLiter(s) Nebulizer every 6 hours  anastrozole 1 milliGRAM(s) Oral daily  budesonide 160 MICROgram(s)/formoterol 4.5 MICROgram(s) Inhaler 2 Puff(s) Inhalation two times a day  chlorhexidine 2% Cloths 1 Application(s) Topical <User Schedule>  enoxaparin Injectable 40 milliGRAM(s) SubCutaneous every 24 hours  montelukast 10 milliGRAM(s) Oral daily  OLANZapine 2.5 milliGRAM(s) Oral daily  pantoprazole    Tablet 40 milliGRAM(s) Oral before breakfast  predniSONE   Tablet   Oral   predniSONE   Tablet 40 milliGRAM(s) Oral daily  simvastatin 20 milliGRAM(s) Oral at bedtime    MEDICATIONS  (PRN):  albuterol/ipratropium for Nebulization 3 milliLiter(s) Nebulizer every 4 hours PRN Shortness of Breath  artificial  tears Solution 1 Drop(s) Both EYES three times a day PRN Dry Eyes  diphenhydrAMINE Injectable 25 milliGRAM(s) IV Push at bedtime PRN Insomnia  Guaifenesin/codeine 100mg/10mg per 5ml 10 milliLiter(s) 10 milliLiter(s) Oral every 6 hours PRN cough  melatonin 3 milliGRAM(s) Oral at bedtime PRN Insomnia

## 2023-12-08 NOTE — PROGRESS NOTE ADULT - PROBLEM SELECTOR PLAN 1
due to asthma exacerbation/ rsv  cxr clear  off hfnc  solumedrol 40 iv bid  symbicort, duoneb q6, singulair 10  nc to keep spo2 >90  bipap prn, qhs
due to asthma exacerbation/ rsv  cxr clear  off hfnc  solumedrol 40 iv bid  symbicort, duoneb q6, singulair 10  nc to keep spo2 >90  bipap prn, qhs
due to asthma exacerbation/ rsv  cxr clear  off hfnc  transitioned to prednisone taper  symbicort, duoneb q6, singulair 10  nc to keep spo2 >90  bipap prn, qhs

## 2023-12-09 ENCOUNTER — TRANSCRIPTION ENCOUNTER (OUTPATIENT)
Age: 63
End: 2023-12-09

## 2023-12-09 LAB
APPEARANCE UR: CLEAR — SIGNIFICANT CHANGE UP
BILIRUB UR-MCNC: NEGATIVE — SIGNIFICANT CHANGE UP
COLOR SPEC: YELLOW — SIGNIFICANT CHANGE UP
DIFF PNL FLD: ABNORMAL
GLUCOSE BLDC GLUCOMTR-MCNC: 233 MG/DL — HIGH (ref 70–99)
GLUCOSE BLDC GLUCOMTR-MCNC: 233 MG/DL — HIGH (ref 70–99)
GLUCOSE UR QL: NEGATIVE MG/DL — SIGNIFICANT CHANGE UP
KETONES UR-MCNC: NEGATIVE MG/DL — SIGNIFICANT CHANGE UP
LEUKOCYTE ESTERASE UR-ACNC: ABNORMAL
NITRITE UR-MCNC: NEGATIVE — SIGNIFICANT CHANGE UP
PH UR: 6 — SIGNIFICANT CHANGE UP (ref 5–8)
PH UR: 6 — SIGNIFICANT CHANGE UP (ref 5–8)
PH UR: 6.5 — SIGNIFICANT CHANGE UP (ref 5–8)
PH UR: 6.5 — SIGNIFICANT CHANGE UP (ref 5–8)
PROT UR-MCNC: NEGATIVE MG/DL — SIGNIFICANT CHANGE UP
SP GR SPEC: 1.01 — SIGNIFICANT CHANGE UP (ref 1–1.03)
SP GR SPEC: 1.01 — SIGNIFICANT CHANGE UP (ref 1–1.03)
SP GR SPEC: 1.02 — SIGNIFICANT CHANGE UP (ref 1–1.03)
SP GR SPEC: 1.02 — SIGNIFICANT CHANGE UP (ref 1–1.03)
UROBILINOGEN FLD QL: 0.2 MG/DL — SIGNIFICANT CHANGE UP (ref 0.2–1)

## 2023-12-09 PROCEDURE — 99232 SBSQ HOSP IP/OBS MODERATE 35: CPT

## 2023-12-09 RX ORDER — PANTOPRAZOLE SODIUM 20 MG/1
1 TABLET, DELAYED RELEASE ORAL
Qty: 0 | Refills: 0 | DISCHARGE
Start: 2023-12-09

## 2023-12-09 RX ORDER — SENNA PLUS 8.6 MG/1
2 TABLET ORAL
Qty: 60 | Refills: 0
Start: 2023-12-09 | End: 2024-01-07

## 2023-12-09 RX ORDER — INFLUENZA VIRUS VACCINE 15; 15; 15; 15 UG/.5ML; UG/.5ML; UG/.5ML; UG/.5ML
0.5 SUSPENSION INTRAMUSCULAR ONCE
Refills: 0 | Status: DISCONTINUED | OUTPATIENT
Start: 2023-12-09 | End: 2023-12-11

## 2023-12-09 RX ADMIN — Medication 40 MILLIGRAM(S): at 06:13

## 2023-12-09 RX ADMIN — OLANZAPINE 2.5 MILLIGRAM(S): 15 TABLET, FILM COATED ORAL at 11:53

## 2023-12-09 RX ADMIN — SIMVASTATIN 20 MILLIGRAM(S): 20 TABLET, FILM COATED ORAL at 21:47

## 2023-12-09 RX ADMIN — Medication 1 DROP(S): at 16:15

## 2023-12-09 RX ADMIN — PANTOPRAZOLE SODIUM 40 MILLIGRAM(S): 20 TABLET, DELAYED RELEASE ORAL at 06:14

## 2023-12-09 RX ADMIN — ENOXAPARIN SODIUM 40 MILLIGRAM(S): 100 INJECTION SUBCUTANEOUS at 11:53

## 2023-12-09 RX ADMIN — Medication 3 MILLILITER(S): at 20:26

## 2023-12-09 RX ADMIN — ANASTROZOLE 1 MILLIGRAM(S): 1 TABLET ORAL at 11:54

## 2023-12-09 RX ADMIN — Medication 3 MILLILITER(S): at 13:09

## 2023-12-09 RX ADMIN — Medication 3 MILLILITER(S): at 08:04

## 2023-12-09 RX ADMIN — CHLORHEXIDINE GLUCONATE 1 APPLICATION(S): 213 SOLUTION TOPICAL at 06:16

## 2023-12-09 RX ADMIN — MONTELUKAST 10 MILLIGRAM(S): 4 TABLET, CHEWABLE ORAL at 11:54

## 2023-12-09 NOTE — PROGRESS NOTE ADULT - SUBJECTIVE AND OBJECTIVE BOX
MAXIMINO IVAN 63y Female  MRN#: [id number]       SUBJECTIVE  Patient is a 63y old Female who presents with a chief complaint of SOB (08 Dec 2023 06:52)  Currently admitted to medicine with the primary diagnosis of Asthma exacerbation  Denies any pain or shortness of breath at rest complains of shortness of breath when she walks to the bathroom reports new dysuria started yesterday denies any fever or chills no overnight events patient on nasal cannula oxygen    OBJECTIVE  PAST MEDICAL & SURGICAL HISTORY  Asthma    Breast cancer      ALLERGIES:  contrast media (iodine-based) (Other (U))    MEDICATIONS:  STANDING MEDICATIONS  albuterol/ipratropium for Nebulization 3 milliLiter(s) Nebulizer every 6 hours  anastrozole 1 milliGRAM(s) Oral daily  budesonide 160 MICROgram(s)/formoterol 4.5 MICROgram(s) Inhaler 2 Puff(s) Inhalation two times a day  chlorhexidine 2% Cloths 1 Application(s) Topical <User Schedule>  enoxaparin Injectable 40 milliGRAM(s) SubCutaneous every 24 hours  influenza   Vaccine 0.5 milliLiter(s) IntraMuscular once  montelukast 10 milliGRAM(s) Oral daily  OLANZapine 2.5 milliGRAM(s) Oral daily  pantoprazole    Tablet 40 milliGRAM(s) Oral before breakfast  predniSONE   Tablet   Oral   predniSONE   Tablet 40 milliGRAM(s) Oral daily  simvastatin 20 milliGRAM(s) Oral at bedtime    PRN MEDICATIONS  albuterol/ipratropium for Nebulization 3 milliLiter(s) Nebulizer every 4 hours PRN  artificial  tears Solution 1 Drop(s) Both EYES three times a day PRN  diphenhydrAMINE Injectable 25 milliGRAM(s) IV Push at bedtime PRN  Guaifenesin/codeine 100mg/10mg per 5ml 10 milliLiter(s) 10 milliLiter(s) Oral every 6 hours PRN  melatonin 3 milliGRAM(s) Oral at bedtime PRN  polyethylene glycol 3350 17 Gram(s) Oral daily PRN  senna 2 Tablet(s) Oral at bedtime PRN      VITAL SIGNS: Last 24 Hours  T(C): 35.8 (09 Dec 2023 04:40), Max: 36.6 (09 Dec 2023 00:00)  T(F): 96.5 (09 Dec 2023 04:40), Max: 97.9 (09 Dec 2023 00:00)  HR: 81 (09 Dec 2023 04:40) (81 - 104)  BP: 135/79 (09 Dec 2023 04:40) (113/96 - 135/79)  BP(mean): 100 (09 Dec 2023 04:40) (92 - 101)  RR: 18 (09 Dec 2023 04:40) (18 - 35)  SpO2: 100% (09 Dec 2023 04:40) (95% - 100%)    LABS:        RADIOLOGY:      PHYSICAL EXAM:    GENERAL: not in distress  HEENT:  No JVD  PULMONARY: Clear to auscultation bilaterally  CARDIOVASCULAR: Regular rate and rhythm  GASTROINTESTINAL: Soft, Nontender, Nondistended  MUSCULOSKELETAL:  No clubbing, cyanosis, or edema  NEUROLOGY: AAOx3  SKIN: No rashes or lesions   MAXIMINO IVAN 63y Female  MRN#: [id number]       SUBJECTIVE  Patient is a 63y old Female who presents with a chief complaint of SOB (08 Dec 2023 06:52)  Currently admitted to medicine with the primary diagnosis of Asthma exacerbation  Denies any pain or shortness of breath at rest complains of shortness of breath when she walks to the bathroom reports new dysuria started yesterday denies any fever or chills no overnight events patient on nasal cannula oxygen    OBJECTIVE  PAST MEDICAL & SURGICAL HISTORY  Asthma    Breast cancer      ALLERGIES:  contrast media (iodine-based) (Other (U))    MEDICATIONS:  STANDING MEDICATIONS  albuterol/ipratropium for Nebulization 3 milliLiter(s) Nebulizer every 6 hours  anastrozole 1 milliGRAM(s) Oral daily  budesonide 160 MICROgram(s)/formoterol 4.5 MICROgram(s) Inhaler 2 Puff(s) Inhalation two times a day  chlorhexidine 2% Cloths 1 Application(s) Topical <User Schedule>  enoxaparin Injectable 40 milliGRAM(s) SubCutaneous every 24 hours  influenza   Vaccine 0.5 milliLiter(s) IntraMuscular once  montelukast 10 milliGRAM(s) Oral daily  OLANZapine 2.5 milliGRAM(s) Oral daily  pantoprazole    Tablet 40 milliGRAM(s) Oral before breakfast  predniSONE   Tablet   Oral   predniSONE   Tablet 40 milliGRAM(s) Oral daily  simvastatin 20 milliGRAM(s) Oral at bedtime    PRN MEDICATIONS  albuterol/ipratropium for Nebulization 3 milliLiter(s) Nebulizer every 4 hours PRN  artificial  tears Solution 1 Drop(s) Both EYES three times a day PRN  diphenhydrAMINE Injectable 25 milliGRAM(s) IV Push at bedtime PRN  Guaifenesin/codeine 100mg/10mg per 5ml 10 milliLiter(s) 10 milliLiter(s) Oral every 6 hours PRN  melatonin 3 milliGRAM(s) Oral at bedtime PRN  polyethylene glycol 3350 17 Gram(s) Oral daily PRN  senna 2 Tablet(s) Oral at bedtime PRN      VITAL SIGNS: Last 24 Hours  T(C): 35.8 (09 Dec 2023 04:40), Max: 36.6 (09 Dec 2023 00:00)  T(F): 96.5 (09 Dec 2023 04:40), Max: 97.9 (09 Dec 2023 00:00)  HR: 81 (09 Dec 2023 04:40) (81 - 104)  BP: 135/79 (09 Dec 2023 04:40) (113/96 - 135/79)  BP(mean): 100 (09 Dec 2023 04:40) (92 - 101)  RR: 18 (09 Dec 2023 04:40) (18 - 35)  SpO2: 100% (09 Dec 2023 04:40) (95% - 100%)    LABS:        RADIOLOGY:      PHYSICAL EXAM:    GENERAL: not in distress  HEENT:  No JVD  PULMONARY: Clear to auscultation bilaterally  CARDIOVASCULAR: Regular rate and rhythm  GASTROINTESTINAL: Soft, Nontender, Nondistended  MUSCULOSKELETAL:  No clubbing, cyanosis, or edema  NEUROLOGY: AAOx3  SKIN: No rashes or lesions

## 2023-12-09 NOTE — DISCHARGE NOTE PROVIDER - PROVIDER TOKENS
PROVIDER:[TOKEN:[[id number]],FOLLOWUP:[1 week]],PROVIDER:[TOKEN:[[id number]],FOLLOWUP:[1 week]] PROVIDER:[TOKEN:[[id number]],FOLLOWUP:[1 week]],PROVIDER:[TOKEN:[[id number]],FOLLOWUP:[1 week]]

## 2023-12-09 NOTE — DISCHARGE NOTE PROVIDER - CARE PROVIDER_API CALL
Charly Alvarado .  Internal Medicine  [address] [place] [address]  Phone: [phone]  Fax: [phone]  Follow Up Time: 1 week    Go Pozo  Pulmonary Disease  [address] [place] [address] [address]  [place] [address]  Phone: [phone]  Fax: [phone]  Follow Up Time: 1 week   Charly Alvarado .  Internal Medicine  [address] [place] [address]  Phone: [phone]  Fax: [phone]  Follow Up Time: 1 week    Go Pozo  Pulmonary Disease  [address] [place] [address] [address]  [place] [address]  Phone: [phone]  Fax: [phone]  Follow Up Time: 1 week

## 2023-12-09 NOTE — PROGRESS NOTE ADULT - ASSESSMENT
63F PMHx HLD, breast ca 2016, asthma (c/b h/o intubation) here with acute hypoxic resp failure, due to RSV.      Acute respiratory failure with hypoxia.   Asthma exacerbation/ rsv  cxr clear  off hfnc  transitioned to prednisone taper  symbicort, duoneb q6, singulair 10  nc to keep spo2 >90, wean off oxygen   bipap prn, qhs.    # Dysuria  check UA      HLD (hyperlipidemia).   outpt f/u.    H/o Breast cancer.    anastrozole 1.    (DVT) prophylaxis.     Pending Wean off oxygen check saturation on ambulation prepare for discharge tomorrow

## 2023-12-09 NOTE — DISCHARGE NOTE PROVIDER - CARE PROVIDERS DIRECT ADDRESSES
,[email or web address],DirectAddress_Unknown ,[email or web address],DirectAddress_Unknown

## 2023-12-09 NOTE — DISCHARGE NOTE PROVIDER - NSDCCPCAREPLAN_GEN_ALL_CORE_FT
PRINCIPAL DISCHARGE DIAGNOSIS  Diagnosis: Asthma exacerbation  Assessment and Plan of Treatment: You came for asthma exacerbation secondary to a virus called RSV.   There is no specific treatment for this virus in your case.   Supportive therapy including steroids were given to you.   - Follow up with primary care physician in 1 week  - Follow up with the lung doctor in 1 week.   - Continue prednisone as prescribed.  If you experience chest pain, shortness of breath, whezzing, fever, or have any other major concern; seek immediate medical attention or call 911.        SECONDARY DISCHARGE DIAGNOSES  Diagnosis: Viral URI  Assessment and Plan of Treatment:      PRINCIPAL DISCHARGE DIAGNOSIS  Diagnosis: Asthma exacerbation  Assessment and Plan of Treatment: You came for asthma exacerbation secondary to a virus called RSV.   There is no specific treatment for this virus in your case.   Supportive therapy including steroids were given to you. Continue prednisone as prescribed.   - Take your asthma medicines exactly as prescribed. Do this even if you feel that your asthma is under control.  - Always use correct inhaler methods for your prescribed medicine delivery system. Refer to the information that came with your medicine from the . Or call your healthcare provider if you have any questions about how to use the device.  - Stay away from triggers that cause asthma flareups or symptoms.  - Learn how to watch your asthma. Some people watch for early changes of symptoms getting worse.   - Always have a quick-relief inhaler with you.  - Follow up with primary care physician in 1 week  - Follow up with the lung doctor in 1 week.   - Continue prednisone as prescribed.  If you experience chest pain, shortness of breath, whezzing, fever, or have any other major concern; seek immediate medical attention or call 911.        SECONDARY DISCHARGE DIAGNOSES  Diagnosis: Viral URI  Assessment and Plan of Treatment:

## 2023-12-09 NOTE — DISCHARGE NOTE PROVIDER - NSDCMRMEDTOKEN_GEN_ALL_CORE_FT
Advair Diskus 250 mcg-50 mcg inhalation powder: 1 puff(s) inhaled 2 times a day  Albuterol (Eqv-ProAir HFA) 90 mcg/inh inhalation aerosol: 2 puff(s) inhaled every 6 hours as needed for  shortness of breath and/or wheezing  anastrozole 1 mg oral tablet: 1 tab(s) orally once a day  mepolizumab 100 mg/mL subcutaneous solution: subcutaneously once a month  montelukast 10 mg oral tablet: 1 tab(s) orally once a day  ocular lubricant ophthalmic solution: 1 drop(s) to each affected eye 3 times a day As needed Dry Eyes  pantoprazole 40 mg oral delayed release tablet: 1 tab(s) orally once a day (before a meal)  predniSONE 10 mg oral tablet: 1 tab(s) orally take 40 mg (4 tablets daily in the morning)- until 12/11/2023  then take 20 mg (2 tablets daily in the morning) for 3 days- from 12/12/2023 until 12/14/2023  then take 10 mg (1 tablet daily in the morning) for 2 days- from 12/15/2023 until 12/16/2023  then take 5 mg (half tablet daily in the morning) for 1 day on 12/17/2023  senna leaf extract oral tablet: 2 tab(s) orally once a day (at bedtime) as needed for Constipation  simvastatin 20 mg oral tablet: 1 tab(s) orally once a day (at bedtime)  Spiriva HandiHaler 18 mcg inhalation capsule: 1 cap(s) inhaled once a day  traZODone 50 mg oral tablet: 1 tab(s) orally once a day (at bedtime) as needed for  insomnia   Advair Diskus 250 mcg-50 mcg inhalation powder: 1 puff(s) inhaled 2 times a day  Albuterol (Eqv-ProAir HFA) 90 mcg/inh inhalation aerosol: 2 puff(s) inhaled every 6 hours as needed for  shortness of breath and/or wheezing  anastrozole 1 mg oral tablet: 1 tab(s) orally once a day  mepolizumab 100 mg/mL subcutaneous solution: subcutaneously once a month  montelukast 10 mg oral tablet: 1 tab(s) orally once a day  ocular lubricant ophthalmic solution: 1 drop(s) to each affected eye 3 times a day As needed Dry Eyes  pantoprazole 40 mg oral delayed release tablet: 1 tab(s) orally once a day (before a meal)  predniSONE 10 mg oral tablet: 1 tab(s) orally once a day take 20 mg (2 tablets daily in the morning) for 3 days- from 12/12/2023 until 12/14/2023  then take 10 mg (1 tablet daily in the morning) for 3 days- from 12/15/2023 until 12/17/2023  then stop it  senna leaf extract oral tablet: 2 tab(s) orally once a day (at bedtime) as needed for Constipation  simvastatin 20 mg oral tablet: 1 tab(s) orally once a day (at bedtime)  Spiriva HandiHaler 18 mcg inhalation capsule: 1 cap(s) inhaled once a day  traZODone 50 mg oral tablet: 1 tab(s) orally once a day (at bedtime) as needed for  insomnia

## 2023-12-09 NOTE — DISCHARGE NOTE PROVIDER - ATTENDING DISCHARGE PHYSICAL EXAMINATION:
Patient seen and examined on 12/11/23.   On PHYSICAL EXAM:    GENERAL: A&O x3,  in NAD/P, anxious  NECK: No Swelling.   CHEST/LUNG: Good air entry, No wheezing, No crackles  HEART: RRR, No murmurs  ABDOMEN: Soft, Nontender  EXTREMITIES:  No clubbing    Discussed with patient regarding the need to follow-up closely with primary care physician and/or Pulmonary specialist as outpatient because of advanced underlying asthma and severity of current RSV infection. Patient expressed understanding and willingness to follow-up. Referred to home PT due to physical weakness and unsteadiness.     I provided Direct clinical care, patient education at bedside,  coordination with nursing and case management/social work teams (auth forms for walker), review of tests and scheduled medications,  and resident supervision of discharge documentation and medication reconciliation.

## 2023-12-09 NOTE — DISCHARGE NOTE PROVIDER - HOSPITAL COURSE
Case of 63-year-old Female with PMHx of HLD, breast cancer in remission 2016, and severe asthma managed by Dr. Peña with hx of intubation for asthma exacerbation who presents to the ED with complaints of SOB.  SOB is of 3 days duration, has been progressively worsening and associated with chest pressure, sore throat, nasal congestion, and green productive cough that began two days ago and has since worsened.  In the ED, vitals were stable, Labs wbc 11k and RSV positive, CXR no focal infiltrates, Given rocephin and azithro, solumedrol, duonebs and started on bipap. The pt has a history of being intubated for asthma and was admitted to the ICU for the management of an acute severe exacerbation of asthma due to RSV.    ICU course Starting Nov 30:  The pt was treated with duonebs q1 initially and then was slowly weaned to duonebs q6 and PRN. She was additionally treated with High dose steroids and was weaned to solumedrol 40bid. The pt was initially placed on bipap continously and now uses only HFNC  40/40 and CPAP at 6 at night. Mild wheezing and rhonchi are still present but has improved since admission.  The pt is stable for downgrade to the SDU on Dec 5.    SDU course starting Dec 5:  Patient was stable on 6L NC --> weaned down to 3L NC.  remained on IV solumedrol 40mg bid until 12/7 --> 12/8 started 10 day prednisone taper.  Labs and vitals stable. no other active issues.   Medrec confirmed.   Patient stable to be downgraded to floor on Dec 8.      Patient is medically cleared for discharge.     - Follow up with primary care physician in 1 week  - Follow up with the lung doctor in 1 week.   - Continue prednisone as prescribed. Case of 63-year-old Female with PMHx of HLD, breast cancer in remission 2016, and severe asthma managed by Dr. Peña with hx of intubation for asthma exacerbation who presents to the ED with complaints of SOB.  SOB is of 3 days duration prior to admission, has been progressively worsening and associated with chest pressure, sore throat, nasal congestion, and green productive cough that began two days ago and has since worsened.  In the ED, vitals were stable, Labs wbc 11k and RSV positive, CXR no focal infiltrates.   Given rocephin and azithro, solumedrol, duonebs and started on bipap.   The pt has a history of being intubated for asthma and was admitted to the ICU for the management of an acute severe exacerbation of asthma due to RSV.    ICU course Starting Nov 30:  The pt was treated with duonebs q1 initially and then was slowly weaned to duonebs q6 and PRN. She was additionally treated with High dose steroids and was weaned to solumedrol 40bid. The pt was initially placed on bipap continously and now uses only HFNC  40/40 and CPAP at 6 at night. Mild wheezing and rhonchi are still present but has improved since admission.  The pt was then downgraded to the SDU on Dec 5.    SDU course starting Dec 5:  Patient was stable on 6L NC --> weaned down to 3L NC.  remained on IV solumedrol 40mg bid until 12/7 --> 12/8 started 10 day prednisone taper.  Labs and vitals stable. no other active issues.     Patient was then transferred to regular floor on Dec 8 and weaned off oxygen. .  Patient was seen by the physical therapist who recommended home PT.     Patient is medically cleared for discharge.     - Follow up with primary care physician in 1 week  - Follow up with the lung doctor in 1 week.   - Continue physical therapy at home.   - Continue prednisone as prescribed.   - Continue your home medications.

## 2023-12-09 NOTE — DISCHARGE NOTE PROVIDER - NSDCFUADDINST_GEN_ALL_CORE_FT
- Follow up with primary care physician in 1 week  - Follow up with the lung doctor in 1 week.   - Continue prednisone as prescribed. - Follow up with primary care physician in 1 week  - Follow up with the lung doctor in 1 week.   - Continue physical therapy at home.   - Continue prednisone as prescribed.   - Continue your home medications.

## 2023-12-10 LAB
ALBUMIN SERPL ELPH-MCNC: 3.4 G/DL — LOW (ref 3.5–5.2)
ALBUMIN SERPL ELPH-MCNC: 3.4 G/DL — LOW (ref 3.5–5.2)
ALP SERPL-CCNC: 62 U/L — SIGNIFICANT CHANGE UP (ref 30–115)
ALP SERPL-CCNC: 62 U/L — SIGNIFICANT CHANGE UP (ref 30–115)
ALT FLD-CCNC: 43 U/L — HIGH (ref 0–41)
ALT FLD-CCNC: 43 U/L — HIGH (ref 0–41)
ANION GAP SERPL CALC-SCNC: 13 MMOL/L — SIGNIFICANT CHANGE UP (ref 7–14)
ANION GAP SERPL CALC-SCNC: 13 MMOL/L — SIGNIFICANT CHANGE UP (ref 7–14)
AST SERPL-CCNC: 20 U/L — SIGNIFICANT CHANGE UP (ref 0–41)
AST SERPL-CCNC: 20 U/L — SIGNIFICANT CHANGE UP (ref 0–41)
BASOPHILS # BLD AUTO: 0 K/UL — SIGNIFICANT CHANGE UP (ref 0–0.2)
BASOPHILS # BLD AUTO: 0 K/UL — SIGNIFICANT CHANGE UP (ref 0–0.2)
BASOPHILS NFR BLD AUTO: 0 % — SIGNIFICANT CHANGE UP (ref 0–1)
BASOPHILS NFR BLD AUTO: 0 % — SIGNIFICANT CHANGE UP (ref 0–1)
BILIRUB SERPL-MCNC: 0.4 MG/DL — SIGNIFICANT CHANGE UP (ref 0.2–1.2)
BILIRUB SERPL-MCNC: 0.4 MG/DL — SIGNIFICANT CHANGE UP (ref 0.2–1.2)
BUN SERPL-MCNC: 34 MG/DL — HIGH (ref 10–20)
BUN SERPL-MCNC: 34 MG/DL — HIGH (ref 10–20)
CALCIUM SERPL-MCNC: 8.7 MG/DL — SIGNIFICANT CHANGE UP (ref 8.4–10.5)
CALCIUM SERPL-MCNC: 8.7 MG/DL — SIGNIFICANT CHANGE UP (ref 8.4–10.5)
CHLORIDE SERPL-SCNC: 100 MMOL/L — SIGNIFICANT CHANGE UP (ref 98–110)
CHLORIDE SERPL-SCNC: 100 MMOL/L — SIGNIFICANT CHANGE UP (ref 98–110)
CO2 SERPL-SCNC: 27 MMOL/L — SIGNIFICANT CHANGE UP (ref 17–32)
CO2 SERPL-SCNC: 27 MMOL/L — SIGNIFICANT CHANGE UP (ref 17–32)
CREAT SERPL-MCNC: 0.6 MG/DL — LOW (ref 0.7–1.5)
CREAT SERPL-MCNC: 0.6 MG/DL — LOW (ref 0.7–1.5)
CULTURE RESULTS: SIGNIFICANT CHANGE UP
CULTURE RESULTS: SIGNIFICANT CHANGE UP
EGFR: 100 ML/MIN/1.73M2 — SIGNIFICANT CHANGE UP
EGFR: 100 ML/MIN/1.73M2 — SIGNIFICANT CHANGE UP
EOSINOPHIL # BLD AUTO: 0.14 K/UL — SIGNIFICANT CHANGE UP (ref 0–0.7)
EOSINOPHIL # BLD AUTO: 0.14 K/UL — SIGNIFICANT CHANGE UP (ref 0–0.7)
EOSINOPHIL NFR BLD AUTO: 0.8 % — SIGNIFICANT CHANGE UP (ref 0–8)
EOSINOPHIL NFR BLD AUTO: 0.8 % — SIGNIFICANT CHANGE UP (ref 0–8)
GLUCOSE SERPL-MCNC: 103 MG/DL — HIGH (ref 70–99)
GLUCOSE SERPL-MCNC: 103 MG/DL — HIGH (ref 70–99)
HCT VFR BLD CALC: 43 % — SIGNIFICANT CHANGE UP (ref 37–47)
HCT VFR BLD CALC: 43 % — SIGNIFICANT CHANGE UP (ref 37–47)
HGB BLD-MCNC: 14.4 G/DL — SIGNIFICANT CHANGE UP (ref 12–16)
HGB BLD-MCNC: 14.4 G/DL — SIGNIFICANT CHANGE UP (ref 12–16)
LYMPHOCYTES # BLD AUTO: 19 % — LOW (ref 20.5–51.1)
LYMPHOCYTES # BLD AUTO: 19 % — LOW (ref 20.5–51.1)
LYMPHOCYTES # BLD AUTO: 3.21 K/UL — SIGNIFICANT CHANGE UP (ref 1.2–3.4)
LYMPHOCYTES # BLD AUTO: 3.21 K/UL — SIGNIFICANT CHANGE UP (ref 1.2–3.4)
MAGNESIUM SERPL-MCNC: 2 MG/DL — SIGNIFICANT CHANGE UP (ref 1.8–2.4)
MAGNESIUM SERPL-MCNC: 2 MG/DL — SIGNIFICANT CHANGE UP (ref 1.8–2.4)
MCHC RBC-ENTMCNC: 28.9 PG — SIGNIFICANT CHANGE UP (ref 27–31)
MCHC RBC-ENTMCNC: 28.9 PG — SIGNIFICANT CHANGE UP (ref 27–31)
MCHC RBC-ENTMCNC: 33.5 G/DL — SIGNIFICANT CHANGE UP (ref 32–37)
MCHC RBC-ENTMCNC: 33.5 G/DL — SIGNIFICANT CHANGE UP (ref 32–37)
MCV RBC AUTO: 86.3 FL — SIGNIFICANT CHANGE UP (ref 81–99)
MCV RBC AUTO: 86.3 FL — SIGNIFICANT CHANGE UP (ref 81–99)
MONOCYTES # BLD AUTO: 1.6 K/UL — HIGH (ref 0.1–0.6)
MONOCYTES # BLD AUTO: 1.6 K/UL — HIGH (ref 0.1–0.6)
MONOCYTES NFR BLD AUTO: 9.5 % — HIGH (ref 1.7–9.3)
MONOCYTES NFR BLD AUTO: 9.5 % — HIGH (ref 1.7–9.3)
NEUTROPHILS # BLD AUTO: 11.34 K/UL — HIGH (ref 1.4–6.5)
NEUTROPHILS # BLD AUTO: 11.34 K/UL — HIGH (ref 1.4–6.5)
NEUTROPHILS NFR BLD AUTO: 66.4 % — SIGNIFICANT CHANGE UP (ref 42.2–75.2)
NEUTROPHILS NFR BLD AUTO: 66.4 % — SIGNIFICANT CHANGE UP (ref 42.2–75.2)
PLATELET # BLD AUTO: 373 K/UL — SIGNIFICANT CHANGE UP (ref 130–400)
PLATELET # BLD AUTO: 373 K/UL — SIGNIFICANT CHANGE UP (ref 130–400)
PMV BLD: 9.6 FL — SIGNIFICANT CHANGE UP (ref 7.4–10.4)
PMV BLD: 9.6 FL — SIGNIFICANT CHANGE UP (ref 7.4–10.4)
POTASSIUM SERPL-MCNC: 4.1 MMOL/L — SIGNIFICANT CHANGE UP (ref 3.5–5)
POTASSIUM SERPL-MCNC: 4.1 MMOL/L — SIGNIFICANT CHANGE UP (ref 3.5–5)
POTASSIUM SERPL-SCNC: 4.1 MMOL/L — SIGNIFICANT CHANGE UP (ref 3.5–5)
POTASSIUM SERPL-SCNC: 4.1 MMOL/L — SIGNIFICANT CHANGE UP (ref 3.5–5)
PROT SERPL-MCNC: 6 G/DL — SIGNIFICANT CHANGE UP (ref 6–8)
PROT SERPL-MCNC: 6 G/DL — SIGNIFICANT CHANGE UP (ref 6–8)
RBC # BLD: 4.98 M/UL — SIGNIFICANT CHANGE UP (ref 4.2–5.4)
RBC # BLD: 4.98 M/UL — SIGNIFICANT CHANGE UP (ref 4.2–5.4)
RBC # FLD: 14.6 % — HIGH (ref 11.5–14.5)
RBC # FLD: 14.6 % — HIGH (ref 11.5–14.5)
SODIUM SERPL-SCNC: 140 MMOL/L — SIGNIFICANT CHANGE UP (ref 135–146)
SODIUM SERPL-SCNC: 140 MMOL/L — SIGNIFICANT CHANGE UP (ref 135–146)
SPECIMEN SOURCE: SIGNIFICANT CHANGE UP
SPECIMEN SOURCE: SIGNIFICANT CHANGE UP
WBC # BLD: 16.88 K/UL — HIGH (ref 4.8–10.8)
WBC # BLD: 16.88 K/UL — HIGH (ref 4.8–10.8)
WBC # FLD AUTO: 16.88 K/UL — HIGH (ref 4.8–10.8)
WBC # FLD AUTO: 16.88 K/UL — HIGH (ref 4.8–10.8)

## 2023-12-10 PROCEDURE — 99232 SBSQ HOSP IP/OBS MODERATE 35: CPT

## 2023-12-10 RX ADMIN — ENOXAPARIN SODIUM 40 MILLIGRAM(S): 100 INJECTION SUBCUTANEOUS at 11:45

## 2023-12-10 RX ADMIN — OLANZAPINE 2.5 MILLIGRAM(S): 15 TABLET, FILM COATED ORAL at 11:45

## 2023-12-10 RX ADMIN — BUDESONIDE AND FORMOTEROL FUMARATE DIHYDRATE 2 PUFF(S): 160; 4.5 AEROSOL RESPIRATORY (INHALATION) at 08:00

## 2023-12-10 RX ADMIN — MONTELUKAST 10 MILLIGRAM(S): 4 TABLET, CHEWABLE ORAL at 11:45

## 2023-12-10 RX ADMIN — CHLORHEXIDINE GLUCONATE 1 APPLICATION(S): 213 SOLUTION TOPICAL at 06:12

## 2023-12-10 RX ADMIN — Medication 3 MILLILITER(S): at 14:07

## 2023-12-10 RX ADMIN — Medication 3 MILLILITER(S): at 20:12

## 2023-12-10 RX ADMIN — SIMVASTATIN 20 MILLIGRAM(S): 20 TABLET, FILM COATED ORAL at 21:28

## 2023-12-10 RX ADMIN — Medication 3 MILLILITER(S): at 08:44

## 2023-12-10 RX ADMIN — ANASTROZOLE 1 MILLIGRAM(S): 1 TABLET ORAL at 11:46

## 2023-12-10 RX ADMIN — Medication 40 MILLIGRAM(S): at 06:12

## 2023-12-10 RX ADMIN — PANTOPRAZOLE SODIUM 40 MILLIGRAM(S): 20 TABLET, DELAYED RELEASE ORAL at 06:13

## 2023-12-10 NOTE — PROGRESS NOTE ADULT - ASSESSMENT
63F PMHx HLD, breast ca 2016, asthma (c/b h/o intubation) here with acute hypoxic resp failure, due to RSV.    # Acute respiratory failure with hypoxia.   # Asthma exacerbation/ rsv  - cxr clear (12/6)  - off hfnc  - ECHO shows normal EF    Plan:  - on prednisone taper  - c/w symbicort, duoneb q6, singulair 10  - nc to keep spo2 >90, wean off oxygen   - bipap prn, qhs.  - repeat CXR in AM     # Debility likely from prolonged hospitalization and asthma exacerbation  - pt has difficulty walking without rollator  - pt lives at home with . Has a son (schizophrenic) at  and another son in [place]; Pt does not believe she can take care of herself in her current condition at home. I recommended rehab, pt will think about it.  - PT/physiatry consult     # Dysuria, resolved  - UA negative    # HLD (hyperlipidemia).   outpt f/u.    # H/o Breast cancer s/p chemo and RT  - on anastrozole    # DVT prophylaxis.     Handoff: Pending Wean off oxygen, check saturation on ambulation, PT/Physiatry eval. Possible home vs rehab.

## 2023-12-10 NOTE — PROGRESS NOTE ADULT - SUBJECTIVE AND OBJECTIVE BOX
MAXIMINO IVAN 63y Female  MRN#: [id number]     SUBJECTIVE  Patient is a 63y old Female who presents with a chief complaint of SOB (08 Dec 2023 06:52)  Today is hospital day 10d, and this morning she is lying in bed without distress.   No acute overnight events.   She states she is feeling better than on admission but still feels weak and SOB on exertion.    OBJECTIVE  PAST MEDICAL & SURGICAL HISTORY  Asthma    Breast cancer      ALLERGIES:  contrast media (iodine-based) (Other (U))    MEDICATIONS:  STANDING MEDICATIONS  albuterol/ipratropium for Nebulization 3 milliLiter(s) Nebulizer every 6 hours  anastrozole 1 milliGRAM(s) Oral daily  budesonide 160 MICROgram(s)/formoterol 4.5 MICROgram(s) Inhaler 2 Puff(s) Inhalation two times a day  chlorhexidine 2% Cloths 1 Application(s) Topical <User Schedule>  enoxaparin Injectable 40 milliGRAM(s) SubCutaneous every 24 hours  influenza   Vaccine 0.5 milliLiter(s) IntraMuscular once  montelukast 10 milliGRAM(s) Oral daily  OLANZapine 2.5 milliGRAM(s) Oral daily  pantoprazole    Tablet 40 milliGRAM(s) Oral before breakfast  predniSONE   Tablet   Oral   predniSONE   Tablet 40 milliGRAM(s) Oral daily  simvastatin 20 milliGRAM(s) Oral at bedtime    PRN MEDICATIONS  albuterol/ipratropium for Nebulization 3 milliLiter(s) Nebulizer every 4 hours PRN  artificial  tears Solution 1 Drop(s) Both EYES three times a day PRN  diphenhydrAMINE Injectable 25 milliGRAM(s) IV Push at bedtime PRN  Guaifenesin/codeine 100mg/10mg per 5ml 10 milliLiter(s) 10 milliLiter(s) Oral every 6 hours PRN  melatonin 3 milliGRAM(s) Oral at bedtime PRN  polyethylene glycol 3350 17 Gram(s) Oral daily PRN  senna 2 Tablet(s) Oral at bedtime PRN    HOME MEDICATIONS  Home Medications:  Advair Diskus 250 mcg-50 mcg inhalation powder: 1 puff(s) inhaled 2 times a day (06 Dec 2023 14:56)  Albuterol (Eqv-ProAir HFA) 90 mcg/inh inhalation aerosol: 2 puff(s) inhaled every 6 hours as needed for  shortness of breath and/or wheezing (06 Dec 2023 14:55)  anastrozole 1 mg oral tablet: 1 tab(s) orally once a day (06 Dec 2023 14:55)  mepolizumab 100 mg/mL subcutaneous solution: subcutaneously once a month (06 Dec 2023 14:55)  montelukast 10 mg oral tablet: 1 tab(s) orally once a day (06 Dec 2023 14:55)  ocular lubricant ophthalmic solution: 1 drop(s) to each affected eye 3 times a day As needed Dry Eyes (09 Dec 2023 12:36)  pantoprazole 40 mg oral delayed release tablet: 1 tab(s) orally once a day (before a meal) (09 Dec 2023 12:36)  simvastatin 20 mg oral tablet: 1 tab(s) orally once a day (at bedtime) (06 Dec 2023 14:56)  Spiriva HandiHaler 18 mcg inhalation capsule: 1 cap(s) inhaled once a day (06 Dec 2023 14:56)  traZODone 50 mg oral tablet: 1 tab(s) orally once a day (at bedtime) as needed for  insomnia (06 Dec 2023 14:56)      VITAL SIGNS: Last 24 Hours  T(C): 35.6 (10 Dec 2023 04:49), Max: 36.5 (09 Dec 2023 13:02)  T(F): 96 (10 Dec 2023 04:49), Max: 97.7 (09 Dec 2023 13:02)  HR: 72 (10 Dec 2023 04:49) (72 - 97)  BP: 116/66 (10 Dec 2023 04:49) (114/73 - 123/71)  BP(mean): 86 (10 Dec 2023 04:49) (86 - 87)  RR: 18 (10 Dec 2023 04:49) (16 - 24)  SpO2: --      LABS:                        14.4   16.88 )-----------( 373      ( 10 Dec 2023 06:11 )             43.0     12-10    140  |  100  |  34<H>  ----------------------------<  103<H>  4.1   |  27  |  0.6<L>    Ca    8.7      10 Dec 2023 06:11  Mg     2.0     12-10    TPro  6.0  /  Alb  3.4<L>  /  TBili  0.4  /  DBili  x   /  AST  20  /  ALT  43<H>  /  AlkPhos  62  12-10    LIVER FUNCTIONS - ( 10 Dec 2023 06:11 )  Alb: 3.4 g/dL / Pro: 6.0 g/dL / ALK PHOS: 62 U/L / ALT: 43 U/L / AST: 20 U/L / GGT: x             Urinalysis Basic - ( 10 Dec 2023 06:11 )    Color: x / Appearance: x / SG: x / pH: x  Gluc: 103 mg/dL / Ketone: x  / Bili: x / Urobili: x   Blood: x / Protein: x / Nitrite: x   Leuk Esterase: x / RBC: x / WBC x   Sq Epi: x / Non Sq Epi: x / Bacteria: x                CAPILLARY BLOOD GLUCOSE      POCT Blood Glucose.: 233 mg/dL (09 Dec 2023 11:31)      RADIOLOGY:    PHYSICAL EXAM:  GENERAL: NAD, AAO x 4, 63y F  HEAD:  Atraumatic, Normocephalic  EYES: EOMI, conjunctivae clear and sclerae white  NECK: Supple, No JVD  CHEST/LUNG: Expiratory rhonchi b/l lung fields. No accessory muscles used  HEART: Regular rate and rhythm; No murmurs;   ABDOMEN: Soft, Nontender, Nondistended; Bowel sounds present; No guarding  EXTREMITIES:  2+ Peripheral Pulses, No cyanosis or edema  NEUROLOGY: non-focal    ADMISSION SUMMARY  Patient is a 63y old Female who presents with a chief complaint of SOB (08 Dec 2023 06:52)    MAXIMINO IVAN 63y Female  MRN#: [id number]     SUBJECTIVE  Patient is a 63y old Female who presents with a chief complaint of SOB (08 Dec 2023 06:52)  Today is hospital day 10d, and this morning she is lying in bed without distress.   No acute overnight events.   She states she is feeling better than on admission but still feels weak and SOB on exertion.    OBJECTIVE  PAST MEDICAL & SURGICAL HISTORY  Asthma    Breast cancer      ALLERGIES:  contrast media (iodine-based) (Other (U))    MEDICATIONS:  STANDING MEDICATIONS  albuterol/ipratropium for Nebulization 3 milliLiter(s) Nebulizer every 6 hours  anastrozole 1 milliGRAM(s) Oral daily  budesonide 160 MICROgram(s)/formoterol 4.5 MICROgram(s) Inhaler 2 Puff(s) Inhalation two times a day  chlorhexidine 2% Cloths 1 Application(s) Topical <User Schedule>  enoxaparin Injectable 40 milliGRAM(s) SubCutaneous every 24 hours  influenza   Vaccine 0.5 milliLiter(s) IntraMuscular once  montelukast 10 milliGRAM(s) Oral daily  OLANZapine 2.5 milliGRAM(s) Oral daily  pantoprazole    Tablet 40 milliGRAM(s) Oral before breakfast  predniSONE   Tablet   Oral   predniSONE   Tablet 40 milliGRAM(s) Oral daily  simvastatin 20 milliGRAM(s) Oral at bedtime    PRN MEDICATIONS  albuterol/ipratropium for Nebulization 3 milliLiter(s) Nebulizer every 4 hours PRN  artificial  tears Solution 1 Drop(s) Both EYES three times a day PRN  diphenhydrAMINE Injectable 25 milliGRAM(s) IV Push at bedtime PRN  Guaifenesin/codeine 100mg/10mg per 5ml 10 milliLiter(s) 10 milliLiter(s) Oral every 6 hours PRN  melatonin 3 milliGRAM(s) Oral at bedtime PRN  polyethylene glycol 3350 17 Gram(s) Oral daily PRN  senna 2 Tablet(s) Oral at bedtime PRN    HOME MEDICATIONS  Home Medications:  Advair Diskus 250 mcg-50 mcg inhalation powder: 1 puff(s) inhaled 2 times a day (06 Dec 2023 14:56)  Albuterol (Eqv-ProAir HFA) 90 mcg/inh inhalation aerosol: 2 puff(s) inhaled every 6 hours as needed for  shortness of breath and/or wheezing (06 Dec 2023 14:55)  anastrozole 1 mg oral tablet: 1 tab(s) orally once a day (06 Dec 2023 14:55)  mepolizumab 100 mg/mL subcutaneous solution: subcutaneously once a month (06 Dec 2023 14:55)  montelukast 10 mg oral tablet: 1 tab(s) orally once a day (06 Dec 2023 14:55)  ocular lubricant ophthalmic solution: 1 drop(s) to each affected eye 3 times a day As needed Dry Eyes (09 Dec 2023 12:36)  pantoprazole 40 mg oral delayed release tablet: 1 tab(s) orally once a day (before a meal) (09 Dec 2023 12:36)  simvastatin 20 mg oral tablet: 1 tab(s) orally once a day (at bedtime) (06 Dec 2023 14:56)  Spiriva HandiHaler 18 mcg inhalation capsule: 1 cap(s) inhaled once a day (06 Dec 2023 14:56)  traZODone 50 mg oral tablet: 1 tab(s) orally once a day (at bedtime) as needed for  insomnia (06 Dec 2023 14:56)      VITAL SIGNS: Last 24 Hours  T(C): 35.6 (10 Dec 2023 04:49), Max: 36.5 (09 Dec 2023 13:02)  T(F): 96 (10 Dec 2023 04:49), Max: 97.7 (09 Dec 2023 13:02)  HR: 72 (10 Dec 2023 04:49) (72 - 97)  BP: 116/66 (10 Dec 2023 04:49) (114/73 - 123/71)  BP(mean): 86 (10 Dec 2023 04:49) (86 - 87)  RR: 18 (10 Dec 2023 04:49) (16 - 24)  SpO2: --      LABS:                        14.4   16.88 )-----------( 373      ( 10 Dec 2023 06:11 )             43.0     12-10    140  |  100  |  34<H>  ----------------------------<  103<H>  4.1   |  27  |  0.6<L>    Ca    8.7      10 Dec 2023 06:11  Mg     2.0     12-10    TPro  6.0  /  Alb  3.4<L>  /  TBili  0.4  /  DBili  x   /  AST  20  /  ALT  43<H>  /  AlkPhos  62  12-10    LIVER FUNCTIONS - ( 10 Dec 2023 06:11 )  Alb: 3.4 g/dL / Pro: 6.0 g/dL / ALK PHOS: 62 U/L / ALT: 43 U/L / AST: 20 U/L / GGT: x             Urinalysis Basic - ( 10 Dec 2023 06:11 )    Color: x / Appearance: x / SG: x / pH: x  Gluc: 103 mg/dL / Ketone: x  / Bili: x / Urobili: x   Blood: x / Protein: x / Nitrite: x   Leuk Esterase: x / RBC: x / WBC x   Sq Epi: x / Non Sq Epi: x / Bacteria: x                CAPILLARY BLOOD GLUCOSE      POCT Blood Glucose.: 233 mg/dL (09 Dec 2023 11:31)      RADIOLOGY:    PHYSICAL EXAM:  GENERAL: NAD, AAO x 4, 63y F  HEAD:  Atraumatic, Normocephalic  EYES: EOMI, conjunctivae clear and sclerae white  NECK: Supple, No JVD  CHEST/LUNG: Expiratory rhonchi b/l lung fields. No accessory muscles used  HEART: Regular rate and rhythm; No murmurs;   ABDOMEN: Soft, Nontender, Nondistended; Bowel sounds present; No guarding  EXTREMITIES:  2+ Peripheral Pulses, No cyanosis or edema  NEUROLOGY: non-focal    ADMISSION SUMMARY  Patient is a 63y old Female who presents with a chief complaint of SOB (08 Dec 2023 06:52)

## 2023-12-10 NOTE — PROGRESS NOTE ADULT - PROVIDER SPECIALTY LIST ADULT
Critical Care
Hospitalist
Critical Care
Hospitalist
MICU
Critical Care
Critical Care
MICU
Critical Care
Internal Medicine

## 2023-12-10 NOTE — CONSULT NOTE ADULT - ASSESSMENT
IMPRESSION: Rehab of deconditioning / Acute respiratory failure with hypoxia. rsv / HLD, breast ca 2016, asthma    PRECAUTIONS: [   ] Cardiac  [   ] Respiratory  [   ] Seizures [   ] Contact Isolation  [   ] Droplet Isolation  [   ] Other    Weight Bearing Status:     RECOMMENDATION:    Out of Bed to Chair     DVT/Decubiti Prophylaxis    REHAB PLAN:     [  x  ] Bedside P/T 3-5 times a week   [    ]   Bedside O/T  2-3 times a week             [    ] Speech Therapy               [    ]  No Rehab Therapy Indicated   Conditioning/ROM                                    ADL  Bed Mobility                                               Conditioning/ROM  Transfers                                                     Bed Mobility  Sitting /Standing Balance                         Transfers                                        Gait Training                                               Sitting/Standing Balance  Stair Training [   ]Applicable                    Home equipment Eval                                                                        Splinting  [   ] Only      GOALS:   ADL   [    ]   Independent                    Transfers  [ x   ] Independent                          Ambulation  [  x  ] Independent     [  x   ] With device                            [    ]  CG                                                         [    ]  CG                                                                  [    ] CG                            [    ] Min A                                                   [    ] Min A                                                              [    ] Min  A          DISCHARGE PLAN:   [    ]  Good candidate for Intensive Rehabilitation/Hospital based                                             Will tolerate 3hrs Intensive Rehab Daily                                       [  x   ]  Short Term Rehab in Skilled Nursing Facility                               vs        [   x  ]  Home with Outpatient or VN services                                         [     ]  Possible Candidate for Intensive Hospital based Rehab

## 2023-12-10 NOTE — CONSULT NOTE ADULT - SUBJECTIVE AND OBJECTIVE BOX
HPI:  Case of 63-year-old Female with PMHx of HLD, breast cancer in remission 2016, and severe asthma managed by Dr. Peña with hx of intubation for asthma exacerbation who presents to the ED with complaints of SOB.    SOB is of 3 days duration, has been progressively worsening and associated with chest pressure, sore throat, nasal congestion, and green productive cough that began two days ago and has since worsened.    Patient reports she has done 3 albuterol nebs and started on  PO streoids with no relief.     Denies fever, nausea, vomiting, abdominal pain, calf pain/swelling, hemoptysis, hx of DVT/PE, sick contacts or recent travel.    In the ED, vitals were stable  Labs wbc 11k and RSV positive    CXR no focal infiltrates (fu official result)    Given rocephin and azithro, solumedrol, duonebs and started on bipap     # Acute respiratory failure with hypoxia.   # Asthma exacerbation/ rsv  - ECHO shows normal EF  - on prednisone taper  - c/w symbicort, duoneb q6, singulair 10  - nc to keep spo2 >90, wean off oxygen   - bipap prn, qhs.      PAST MEDICAL & SURGICAL HISTORY:  Asthma      Breast cancer          Hospital Course:    TODAY'S SUBJECTIVE & REVIEW OF SYMPTOMS:     Constitutional WNL   Cardio WNL   Resp sob    GI WNL  Heme WNL  Endo WNL  Skin WNL  MSK Weakness   Neuro WNL  Cognitive WNL  Psych WNL      MEDICATIONS  (STANDING):  albuterol/ipratropium for Nebulization 3 milliLiter(s) Nebulizer every 6 hours  anastrozole 1 milliGRAM(s) Oral daily  budesonide 160 MICROgram(s)/formoterol 4.5 MICROgram(s) Inhaler 2 Puff(s) Inhalation two times a day  chlorhexidine 2% Cloths 1 Application(s) Topical <User Schedule>  enoxaparin Injectable 40 milliGRAM(s) SubCutaneous every 24 hours  influenza   Vaccine 0.5 milliLiter(s) IntraMuscular once  montelukast 10 milliGRAM(s) Oral daily  OLANZapine 2.5 milliGRAM(s) Oral daily  pantoprazole    Tablet 40 milliGRAM(s) Oral before breakfast  predniSONE   Tablet 40 milliGRAM(s) Oral daily  predniSONE   Tablet   Oral   simvastatin 20 milliGRAM(s) Oral at bedtime    MEDICATIONS  (PRN):  albuterol/ipratropium for Nebulization 3 milliLiter(s) Nebulizer every 4 hours PRN Shortness of Breath  artificial  tears Solution 1 Drop(s) Both EYES three times a day PRN Dry Eyes  diphenhydrAMINE Injectable 25 milliGRAM(s) IV Push at bedtime PRN Insomnia  Guaifenesin/codeine 100mg/10mg per 5ml 10 milliLiter(s) 10 milliLiter(s) Oral every 6 hours PRN cough  melatonin 3 milliGRAM(s) Oral at bedtime PRN Insomnia  polyethylene glycol 3350 17 Gram(s) Oral daily PRN Constipation  senna 2 Tablet(s) Oral at bedtime PRN Constipation      FAMILY HISTORY:      Allergies    contrast media (iodine-based) (Other (U))    Intolerances        SOCIAL HISTORY:    [  ] Etoh  [  ] Smoking  [  ] Substance abuse     Home Environment:  [   ] Home Alone  [ x  ] Lives with Family  [   ] Home Health Aid    Dwelling:  [   ] Apartment  [ x  ] Private House  [   ] Adult Home  [   ] Skilled Nursing Facility      [   ] Short Term  [   ] Long Term  [ x] Stairs       Elevator [   ]    FUNCTIONAL STATUS PTA: (Check all that apply)  Ambulation: [ x   ]Independent    [   ] Dependent     [   ] Non-Ambulatory  Assistive Device: [   ] SA Cane  [   ]  Q Cane  [   ] Walker  [   ]  Wheelchair  ADL : [ x  ] Independent  [    ]  Dependent       Vital Signs Last 24 Hrs  T(C): 36.1 (10 Dec 2023 13:10), Max: 36.2 (09 Dec 2023 20:23)  T(F): 96.9 (10 Dec 2023 13:10), Max: 97.2 (09 Dec 2023 20:23)  HR: 92 (10 Dec 2023 13:10) (72 - 92)  BP: 119/68 (10 Dec 2023 13:10) (114/73 - 119/68)  BP(mean): 86 (10 Dec 2023 04:49) (86 - 87)  RR: 20 (10 Dec 2023 13:10) (16 - 20)  SpO2: --    Parameters below as of 10 Dec 2023 13:10  Patient On (Oxygen Delivery Method): nasal cannula          PHYSICAL EXAM: Awake & Alert  GENERAL: NAD  HEAD:  Normocephalic  CHEST/LUNG: Clear   HEART: S1S2+  ABDOMEN: Soft, Nontender  EXTREMITIES:  no calf tenderness    NERVOUS SYSTEM:  Cranial Nerves 2-12 intact [   ] Abnormal  [   ]  ROM: WFL all extremities [  x ]  Abnormal [   ]  Motor Strength: WFL all extremities  [  x ]  Abnormal [   ]  Sensation: intact to light touch [ x  ] Abnormal [   ]    FUNCTIONAL STATUS:  Bed Mobility: Independent [   ]  Supervision [   ]  Needs Assistance [  x ]  N/A [   ]  Transfers: Independent [   ]  Supervision [   ]  Needs Assistance [  x ]  N/A [   ]   Ambulation: Independent [   ]  Supervision [   ]  Needs Assistance [ x  ]  N/A [   ]  ADL: Independent [   ] Requires Assistance [   ] N/A [   ]      LABS:                        14.4   16.88 )-----------( 373      ( 10 Dec 2023 06:11 )             43.0     12-10    140  |  100  |  34<H>  ----------------------------<  103<H>  4.1   |  27  |  0.6<L>    Ca    8.7      10 Dec 2023 06:11  Mg     2.0     12-10    TPro  6.0  /  Alb  3.4<L>  /  TBili  0.4  /  DBili  x   /  AST  20  /  ALT  43<H>  /  AlkPhos  62  12-10      Urinalysis Basic - ( 10 Dec 2023 06:11 )    Color: x / Appearance: x / SG: x / pH: x  Gluc: 103 mg/dL / Ketone: x  / Bili: x / Urobili: x   Blood: x / Protein: x / Nitrite: x   Leuk Esterase: x / RBC: x / WBC x   Sq Epi: x / Non Sq Epi: x / Bacteria: x        RADIOLOGY & ADDITIONAL STUDIES:

## 2023-12-11 ENCOUNTER — TRANSCRIPTION ENCOUNTER (OUTPATIENT)
Age: 63
End: 2023-12-11

## 2023-12-11 VITALS
OXYGEN SATURATION: 97 % | HEART RATE: 87 BPM | DIASTOLIC BLOOD PRESSURE: 73 MMHG | SYSTOLIC BLOOD PRESSURE: 122 MMHG | TEMPERATURE: 97 F | RESPIRATION RATE: 22 BRPM

## 2023-12-11 LAB
ALBUMIN SERPL ELPH-MCNC: 3.5 G/DL — SIGNIFICANT CHANGE UP (ref 3.5–5.2)
ALBUMIN SERPL ELPH-MCNC: 3.5 G/DL — SIGNIFICANT CHANGE UP (ref 3.5–5.2)
ALP SERPL-CCNC: 57 U/L — SIGNIFICANT CHANGE UP (ref 30–115)
ALP SERPL-CCNC: 57 U/L — SIGNIFICANT CHANGE UP (ref 30–115)
ALT FLD-CCNC: 44 U/L — HIGH (ref 0–41)
ALT FLD-CCNC: 44 U/L — HIGH (ref 0–41)
ANION GAP SERPL CALC-SCNC: 8 MMOL/L — SIGNIFICANT CHANGE UP (ref 7–14)
ANION GAP SERPL CALC-SCNC: 8 MMOL/L — SIGNIFICANT CHANGE UP (ref 7–14)
AST SERPL-CCNC: 21 U/L — SIGNIFICANT CHANGE UP (ref 0–41)
AST SERPL-CCNC: 21 U/L — SIGNIFICANT CHANGE UP (ref 0–41)
BASOPHILS # BLD AUTO: 0.07 K/UL — SIGNIFICANT CHANGE UP (ref 0–0.2)
BASOPHILS # BLD AUTO: 0.07 K/UL — SIGNIFICANT CHANGE UP (ref 0–0.2)
BASOPHILS NFR BLD AUTO: 0.5 % — SIGNIFICANT CHANGE UP (ref 0–1)
BASOPHILS NFR BLD AUTO: 0.5 % — SIGNIFICANT CHANGE UP (ref 0–1)
BILIRUB SERPL-MCNC: 0.3 MG/DL — SIGNIFICANT CHANGE UP (ref 0.2–1.2)
BILIRUB SERPL-MCNC: 0.3 MG/DL — SIGNIFICANT CHANGE UP (ref 0.2–1.2)
BUN SERPL-MCNC: 36 MG/DL — HIGH (ref 10–20)
BUN SERPL-MCNC: 36 MG/DL — HIGH (ref 10–20)
CALCIUM SERPL-MCNC: 8.7 MG/DL — SIGNIFICANT CHANGE UP (ref 8.4–10.4)
CALCIUM SERPL-MCNC: 8.7 MG/DL — SIGNIFICANT CHANGE UP (ref 8.4–10.4)
CHLORIDE SERPL-SCNC: 102 MMOL/L — SIGNIFICANT CHANGE UP (ref 98–110)
CHLORIDE SERPL-SCNC: 102 MMOL/L — SIGNIFICANT CHANGE UP (ref 98–110)
CO2 SERPL-SCNC: 29 MMOL/L — SIGNIFICANT CHANGE UP (ref 17–32)
CO2 SERPL-SCNC: 29 MMOL/L — SIGNIFICANT CHANGE UP (ref 17–32)
CREAT SERPL-MCNC: 0.7 MG/DL — SIGNIFICANT CHANGE UP (ref 0.7–1.5)
CREAT SERPL-MCNC: 0.7 MG/DL — SIGNIFICANT CHANGE UP (ref 0.7–1.5)
EGFR: 97 ML/MIN/1.73M2 — SIGNIFICANT CHANGE UP
EGFR: 97 ML/MIN/1.73M2 — SIGNIFICANT CHANGE UP
EOSINOPHIL # BLD AUTO: 0.17 K/UL — SIGNIFICANT CHANGE UP (ref 0–0.7)
EOSINOPHIL # BLD AUTO: 0.17 K/UL — SIGNIFICANT CHANGE UP (ref 0–0.7)
EOSINOPHIL NFR BLD AUTO: 1.1 % — SIGNIFICANT CHANGE UP (ref 0–8)
EOSINOPHIL NFR BLD AUTO: 1.1 % — SIGNIFICANT CHANGE UP (ref 0–8)
GLUCOSE SERPL-MCNC: 105 MG/DL — HIGH (ref 70–99)
GLUCOSE SERPL-MCNC: 105 MG/DL — HIGH (ref 70–99)
HCT VFR BLD CALC: 40.8 % — SIGNIFICANT CHANGE UP (ref 37–47)
HCT VFR BLD CALC: 40.8 % — SIGNIFICANT CHANGE UP (ref 37–47)
HGB BLD-MCNC: 13.8 G/DL — SIGNIFICANT CHANGE UP (ref 12–16)
HGB BLD-MCNC: 13.8 G/DL — SIGNIFICANT CHANGE UP (ref 12–16)
IMM GRANULOCYTES NFR BLD AUTO: 4 % — HIGH (ref 0.1–0.3)
IMM GRANULOCYTES NFR BLD AUTO: 4 % — HIGH (ref 0.1–0.3)
LYMPHOCYTES # BLD AUTO: 25.1 % — SIGNIFICANT CHANGE UP (ref 20.5–51.1)
LYMPHOCYTES # BLD AUTO: 25.1 % — SIGNIFICANT CHANGE UP (ref 20.5–51.1)
LYMPHOCYTES # BLD AUTO: 3.72 K/UL — HIGH (ref 1.2–3.4)
LYMPHOCYTES # BLD AUTO: 3.72 K/UL — HIGH (ref 1.2–3.4)
MAGNESIUM SERPL-MCNC: 2 MG/DL — SIGNIFICANT CHANGE UP (ref 1.8–2.4)
MAGNESIUM SERPL-MCNC: 2 MG/DL — SIGNIFICANT CHANGE UP (ref 1.8–2.4)
MCHC RBC-ENTMCNC: 29.6 PG — SIGNIFICANT CHANGE UP (ref 27–31)
MCHC RBC-ENTMCNC: 29.6 PG — SIGNIFICANT CHANGE UP (ref 27–31)
MCHC RBC-ENTMCNC: 33.8 G/DL — SIGNIFICANT CHANGE UP (ref 32–37)
MCHC RBC-ENTMCNC: 33.8 G/DL — SIGNIFICANT CHANGE UP (ref 32–37)
MCV RBC AUTO: 87.4 FL — SIGNIFICANT CHANGE UP (ref 81–99)
MCV RBC AUTO: 87.4 FL — SIGNIFICANT CHANGE UP (ref 81–99)
MONOCYTES # BLD AUTO: 1.63 K/UL — HIGH (ref 0.1–0.6)
MONOCYTES # BLD AUTO: 1.63 K/UL — HIGH (ref 0.1–0.6)
MONOCYTES NFR BLD AUTO: 11 % — HIGH (ref 1.7–9.3)
MONOCYTES NFR BLD AUTO: 11 % — HIGH (ref 1.7–9.3)
NEUTROPHILS # BLD AUTO: 8.63 K/UL — HIGH (ref 1.4–6.5)
NEUTROPHILS # BLD AUTO: 8.63 K/UL — HIGH (ref 1.4–6.5)
NEUTROPHILS NFR BLD AUTO: 58.3 % — SIGNIFICANT CHANGE UP (ref 42.2–75.2)
NEUTROPHILS NFR BLD AUTO: 58.3 % — SIGNIFICANT CHANGE UP (ref 42.2–75.2)
NRBC # BLD: 0 /100 WBCS — SIGNIFICANT CHANGE UP (ref 0–0)
NRBC # BLD: 0 /100 WBCS — SIGNIFICANT CHANGE UP (ref 0–0)
PLATELET # BLD AUTO: 348 K/UL — SIGNIFICANT CHANGE UP (ref 130–400)
PLATELET # BLD AUTO: 348 K/UL — SIGNIFICANT CHANGE UP (ref 130–400)
PMV BLD: 9.5 FL — SIGNIFICANT CHANGE UP (ref 7.4–10.4)
PMV BLD: 9.5 FL — SIGNIFICANT CHANGE UP (ref 7.4–10.4)
POTASSIUM SERPL-MCNC: 4 MMOL/L — SIGNIFICANT CHANGE UP (ref 3.5–5)
POTASSIUM SERPL-MCNC: 4 MMOL/L — SIGNIFICANT CHANGE UP (ref 3.5–5)
POTASSIUM SERPL-SCNC: 4 MMOL/L — SIGNIFICANT CHANGE UP (ref 3.5–5)
POTASSIUM SERPL-SCNC: 4 MMOL/L — SIGNIFICANT CHANGE UP (ref 3.5–5)
PROT SERPL-MCNC: 5.6 G/DL — LOW (ref 6–8)
PROT SERPL-MCNC: 5.6 G/DL — LOW (ref 6–8)
RBC # BLD: 4.67 M/UL — SIGNIFICANT CHANGE UP (ref 4.2–5.4)
RBC # BLD: 4.67 M/UL — SIGNIFICANT CHANGE UP (ref 4.2–5.4)
RBC # FLD: 14.6 % — HIGH (ref 11.5–14.5)
RBC # FLD: 14.6 % — HIGH (ref 11.5–14.5)
SODIUM SERPL-SCNC: 139 MMOL/L — SIGNIFICANT CHANGE UP (ref 135–146)
SODIUM SERPL-SCNC: 139 MMOL/L — SIGNIFICANT CHANGE UP (ref 135–146)
WBC # BLD: 14.81 K/UL — HIGH (ref 4.8–10.8)
WBC # BLD: 14.81 K/UL — HIGH (ref 4.8–10.8)
WBC # FLD AUTO: 14.81 K/UL — HIGH (ref 4.8–10.8)
WBC # FLD AUTO: 14.81 K/UL — HIGH (ref 4.8–10.8)

## 2023-12-11 PROCEDURE — 99239 HOSP IP/OBS DSCHRG MGMT >30: CPT

## 2023-12-11 PROCEDURE — 71045 X-RAY EXAM CHEST 1 VIEW: CPT | Mod: 26

## 2023-12-11 RX ADMIN — CHLORHEXIDINE GLUCONATE 1 APPLICATION(S): 213 SOLUTION TOPICAL at 05:36

## 2023-12-11 RX ADMIN — BUDESONIDE AND FORMOTEROL FUMARATE DIHYDRATE 2 PUFF(S): 160; 4.5 AEROSOL RESPIRATORY (INHALATION) at 08:49

## 2023-12-11 RX ADMIN — ENOXAPARIN SODIUM 40 MILLIGRAM(S): 100 INJECTION SUBCUTANEOUS at 11:44

## 2023-12-11 RX ADMIN — Medication 3 MILLILITER(S): at 07:40

## 2023-12-11 RX ADMIN — MONTELUKAST 10 MILLIGRAM(S): 4 TABLET, CHEWABLE ORAL at 11:44

## 2023-12-11 RX ADMIN — PANTOPRAZOLE SODIUM 40 MILLIGRAM(S): 20 TABLET, DELAYED RELEASE ORAL at 06:07

## 2023-12-11 RX ADMIN — Medication 3 MILLILITER(S): at 04:04

## 2023-12-11 RX ADMIN — Medication 40 MILLIGRAM(S): at 05:36

## 2023-12-11 RX ADMIN — ANASTROZOLE 1 MILLIGRAM(S): 1 TABLET ORAL at 11:44

## 2023-12-11 RX ADMIN — OLANZAPINE 2.5 MILLIGRAM(S): 15 TABLET, FILM COATED ORAL at 11:45

## 2023-12-11 NOTE — DISCHARGE NOTE NURSING/CASE MANAGEMENT/SOCIAL WORK - PATIENT PORTAL LINK FT
You can access the FollowMyHealth Patient Portal offered by [facility] by registering at the following website: [email or web address]. By joining [employer]’s FollowMyHealth portal, you will also be able to view your health information using other applications (apps) compatible with our system. You can access the FollowMyHealth Patient Portal offered by [facility] by registering at the following website: [email or web address]. By joining [employer]’s FollowMyHealth portal, you will also be able to view your health information using other applications (apps) compatible with our system.

## 2023-12-11 NOTE — CHART NOTE - NSCHARTNOTESELECT_GEN_ALL_CORE
Daily Note/Event Note
Medical Device/Event Note
Medical Note/Event Note
MedRec/Event Note
DGTF/Transfer Note
Transfer Note

## 2023-12-11 NOTE — CHART NOTE - NSCHARTNOTEFT_GEN_A_CORE
To Whom It May Concern     Mrs Dulce Maria Aguila has been admitted to our hospital since 11/30/2023 for need of acute inpatient care. Due to her advanced respiratory illness, she needs home rest post hospitalization. She is expected to be able to resume her professional duties starting 12/20/2023.     Han Kang MD

## 2023-12-11 NOTE — PHYSICAL THERAPY INITIAL EVALUATION ADULT - PERTINENT HX OF CURRENT PROBLEM, REHAB EVAL
Pt is a  63-year-old Female with PMHx of HLD, breast cancer in remission 2016, and severe asthma managed by Dr. Peña with hx of intubation for asthma exacerbation who presents to the ED with complaints of SOB. SOB is of 3 days duration, has been progressively worsening and associated with chest pressure, sore throat, nasal congestion, and green productive cough that began two days ago and has since worsened.  Patient reports she has done 3 albuterol nebs and started on  PO streoids with no relief.   Denies fever, nausea, vomiting, abdominal pain, calf pain/swelling, hemoptysis, hx of DVT/PE, sick contacts or recent travel.

## 2023-12-11 NOTE — CHART NOTE - NSCHARTNOTEFT_GEN_A_CORE
Patient was evaluated by our PT team and was found in need for rolling walker due to her unsteady balance. She was noted with decreased walt, decreased step length, decreased eight-shifting ability, along with decreased endurance and strength; all related to her advanced pulmonary disease and respiratory failure.

## 2023-12-11 NOTE — PHYSICAL THERAPY INITIAL EVALUATION ADULT - GAIT DISTANCE, PT EVAL
40 ft x 2 with RW contact guard - SPO2 96% on room air after ambulation with mild SOB relieved by rest. Pt then ambulated 10 ft x 2 with contact guard to w/c for stair assessment after rest break.

## 2023-12-11 NOTE — PHYSICAL THERAPY INITIAL EVALUATION ADULT - GENERAL OBSERVATIONS, REHAB EVAL
11:00-11:23 Pt encountered seated in b/s chair in NAD. Spouse at bedside. SPO2 on room air at rest 94%, post ambulation 96% with mild SOB relieved by rest. Pt reports hx of numbness to B feet from hx of chemo.

## 2023-12-11 NOTE — DISCHARGE NOTE NURSING/CASE MANAGEMENT/SOCIAL WORK - NSDCPEFALRISK_GEN_ALL_CORE
For information on Fall & Injury Prevention, visit: [email or web address] OR  [email or web address] OR  https://www.cdc.gov/steadi/patient.html For information on Fall & Injury Prevention, visit: [email or web address] OR  [email or web address] OR  https://www.cdc.gov/steadi/patient.html

## 2023-12-11 NOTE — PHYSICAL THERAPY INITIAL EVALUATION ADULT - ADDITIONAL COMMENTS
Pt lives in pvt home with spouse, has 1 flight of steps to bedroom, has bathroom on 1st floor. 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 Pt lives in pvt home with spouse, has 1 flight of steps to bedroom, has bathroom on 1st floor. 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

## 2023-12-11 NOTE — PHYSICAL THERAPY INITIAL EVALUATION ADULT - LEVEL OF INDEPENDENCE: SUPINE/SIT, REHAB EVAL
Post initial phone call made  Render Risk Assessment In Note?: no Detail Level: Simple Comment: Pt would like to pursue Efudex BID x 2 weeks to scalp but will start after his upcoming trip.  He would like to treat AK's on the face with LN2 at his Efudex f/u. pt already seated in b/s chair Comment: Schedule a MM skin check.

## 2023-12-21 DIAGNOSIS — Z85.3 PERSONAL HISTORY OF MALIGNANT NEOPLASM OF BREAST: ICD-10-CM

## 2023-12-21 DIAGNOSIS — E78.5 HYPERLIPIDEMIA, UNSPECIFIED: ICD-10-CM

## 2023-12-21 DIAGNOSIS — J45.901 UNSPECIFIED ASTHMA WITH (ACUTE) EXACERBATION: ICD-10-CM

## 2023-12-21 DIAGNOSIS — D72.829 ELEVATED WHITE BLOOD CELL COUNT, UNSPECIFIED: ICD-10-CM

## 2023-12-21 DIAGNOSIS — B97.4 RESPIRATORY SYNCYTIAL VIRUS AS THE CAUSE OF DISEASES CLASSIFIED ELSEWHERE: ICD-10-CM

## 2023-12-21 DIAGNOSIS — J96.01 ACUTE RESPIRATORY FAILURE WITH HYPOXIA: ICD-10-CM

## 2024-02-23 DIAGNOSIS — R91.8 OTHER NONSPECIFIC ABNORMAL FINDING OF LUNG FIELD: ICD-10-CM

## 2024-03-07 NOTE — PROGRESS NOTE ADULT - ASSESSMENT
Discharge Instructions Following Placement of Your Implanted Port    What is it?  An implanted port is a central venous access device that is inserted under the collarbone in a large blood vessel near the heart. The port and catheter are entirely under the skin. After the original incision has healed, you should only feel a small bump under the skin.    What is it used for?  By inserting a special needle through the skin into the port, IV fluids can be given or blood samples can be withdrawn. If your port will be used often, a catheter attached to a special needle will be left in the port. A sterile dressing will be applied to hold the catheter in place. The clinic will change the needle and dressing as needed.    What special care does the port need?  You may have some pain, swelling, and bruising for one to two weeks after your implanted port is inserted. Your shoulder on the port side may feel stiff and sore. To relieve some of these symptoms you may try:    ICE - Ice is best if started right after your port is inserted. Apply ice (crushed ice in a plastic bag covered by a towel) to the port area for 15 to 20 minutes every hour as long as you need it. Do not sleep with an ice pack on because it may cause frostbite.    HEAT - You can apply a warm compress (small towel dampened with hot water and placed in a plastic bag or heating pad set on low) to your port site after the first 24 to 48 hours as needed for comfort. Apply heat for 15 to 20 minutes every hour as long as you need it. Do not sleep with heat on because it may cause a burn.    MEDICATION - You can take over the counter medication (Tylenol, Motrin) as directed on the package to relieve pain and swelling as long as it does not interfere with any other medication that you are taking.    Because the catheter is in the blood vessel, the catheter will need to be flushed to prevent blood clots from forming. The nurse will flush the catheter after each use. If  the catheter is not used regularly, it will need to be flushed monthly to keep the catheter patent (open).  Check with your clinic nurse to see when it will be necessary to come in to have your port flushed.    Activities:  Since the port is entirely under the skin, most normal activities, including swimming and bathing, are permitted once the incision has healed completely.       Discharge instructions:  There may be stitches or adhesive strips holding your incision together. These will usually stay in place for 7 to 10 days. Your doctor will remove the stitches at this time. The adhesive strips will probably fall off on their own.     Do not take tub baths, use hot tubs or swim for the next 10 to 14 days.   Keep the incision site clean and dry at all times. You may shower, but if a dressing is in place, you need to put a waterproof covering over the dressing while in the shower.  No lifting, pushing or pulling over 10 pounds for the next 10 to 14 days.  Do not remove the original dressing. Your dressing will be changed at the clinic.    For the next 24 hours:  Do not drive.  Do not operate heavy or potentially harmful equipment.  Do not make legally binding decisions.  Do not drink alcohol, including beer.    Call your doctor or the Interventional Radiology Department at ([phone]) if you have any of the following:  Fever over 100.4 F  Redness, tenderness or warmth around the port site  Drainage from from the incision site  Swelling in your face or neck  Red streak from the port site up your chest  Pain in your shoulder, arms or neck that does not go away or gets worse  Movement of the port     63F PMHx HLD, breast ca 2016, asthma (c/b h/o intubation) here with acute hypoxic resp failure, due to RSV.

## 2024-03-30 ENCOUNTER — INPATIENT (INPATIENT)
Facility: HOSPITAL | Age: 64
LOS: 1 days | Discharge: HOME | DRG: 203 | End: 2024-04-01
Attending: INTERNAL MEDICINE | Admitting: STUDENT IN AN ORGANIZED HEALTH CARE EDUCATION/TRAINING PROGRAM
Payer: COMMERCIAL

## 2024-03-30 VITALS
WEIGHT: 149.91 LBS | SYSTOLIC BLOOD PRESSURE: 113 MMHG | HEIGHT: 66 IN | OXYGEN SATURATION: 94 % | DIASTOLIC BLOOD PRESSURE: 75 MMHG | HEART RATE: 80 BPM | TEMPERATURE: 98 F | RESPIRATION RATE: 20 BRPM

## 2024-03-30 DIAGNOSIS — J45.901 UNSPECIFIED ASTHMA WITH (ACUTE) EXACERBATION: ICD-10-CM

## 2024-03-30 LAB
ALBUMIN SERPL ELPH-MCNC: 4.3 G/DL — SIGNIFICANT CHANGE UP (ref 3.5–5.2)
ALP SERPL-CCNC: 78 U/L — SIGNIFICANT CHANGE UP (ref 30–115)
ALT FLD-CCNC: 21 U/L — SIGNIFICANT CHANGE UP (ref 0–41)
ANION GAP SERPL CALC-SCNC: 8 MMOL/L — SIGNIFICANT CHANGE UP (ref 7–14)
AST SERPL-CCNC: 43 U/L — HIGH (ref 0–41)
BASE EXCESS BLDV CALC-SCNC: 4.6 MMOL/L — HIGH (ref -2–3)
BASOPHILS # BLD AUTO: 0.05 K/UL — SIGNIFICANT CHANGE UP (ref 0–0.2)
BASOPHILS NFR BLD AUTO: 0.7 % — SIGNIFICANT CHANGE UP (ref 0–1)
BILIRUB SERPL-MCNC: <0.2 MG/DL — SIGNIFICANT CHANGE UP (ref 0.2–1.2)
BUN SERPL-MCNC: 13 MG/DL — SIGNIFICANT CHANGE UP (ref 10–20)
CA-I SERPL-SCNC: 1.18 MMOL/L — SIGNIFICANT CHANGE UP (ref 1.15–1.33)
CALCIUM SERPL-MCNC: 9.4 MG/DL — SIGNIFICANT CHANGE UP (ref 8.4–10.5)
CHLORIDE SERPL-SCNC: 103 MMOL/L — SIGNIFICANT CHANGE UP (ref 98–110)
CO2 SERPL-SCNC: 27 MMOL/L — SIGNIFICANT CHANGE UP (ref 17–32)
CREAT SERPL-MCNC: 0.8 MG/DL — SIGNIFICANT CHANGE UP (ref 0.7–1.5)
D DIMER BLD IA.RAPID-MCNC: <150 NG/ML DDU — SIGNIFICANT CHANGE UP
EGFR: 82 ML/MIN/1.73M2 — SIGNIFICANT CHANGE UP
EOSINOPHIL # BLD AUTO: 0.07 K/UL — SIGNIFICANT CHANGE UP (ref 0–0.7)
EOSINOPHIL NFR BLD AUTO: 1 % — SIGNIFICANT CHANGE UP (ref 0–8)
GAS PNL BLDV: 135 MMOL/L — LOW (ref 136–145)
GAS PNL BLDV: SIGNIFICANT CHANGE UP
GAS PNL BLDV: SIGNIFICANT CHANGE UP
GLUCOSE SERPL-MCNC: 122 MG/DL — HIGH (ref 70–99)
HCO3 BLDV-SCNC: 29 MMOL/L — SIGNIFICANT CHANGE UP (ref 22–29)
HCT VFR BLD CALC: 43.8 % — SIGNIFICANT CHANGE UP (ref 37–47)
HCT VFR BLDA CALC: 45 % — SIGNIFICANT CHANGE UP (ref 34.5–46.5)
HGB BLD CALC-MCNC: 14.9 G/DL — SIGNIFICANT CHANGE UP (ref 11.7–16.1)
HGB BLD-MCNC: 14.8 G/DL — SIGNIFICANT CHANGE UP (ref 12–16)
IMM GRANULOCYTES NFR BLD AUTO: 0.6 % — HIGH (ref 0.1–0.3)
LACTATE BLDV-MCNC: 1.9 MMOL/L — SIGNIFICANT CHANGE UP (ref 0.5–2)
LYMPHOCYTES # BLD AUTO: 1.87 K/UL — SIGNIFICANT CHANGE UP (ref 1.2–3.4)
LYMPHOCYTES # BLD AUTO: 27.5 % — SIGNIFICANT CHANGE UP (ref 20.5–51.1)
MCHC RBC-ENTMCNC: 29.9 PG — SIGNIFICANT CHANGE UP (ref 27–31)
MCHC RBC-ENTMCNC: 33.8 G/DL — SIGNIFICANT CHANGE UP (ref 32–37)
MCV RBC AUTO: 88.5 FL — SIGNIFICANT CHANGE UP (ref 81–99)
MONOCYTES # BLD AUTO: 0.88 K/UL — HIGH (ref 0.1–0.6)
MONOCYTES NFR BLD AUTO: 13 % — HIGH (ref 1.7–9.3)
NEUTROPHILS # BLD AUTO: 3.88 K/UL — SIGNIFICANT CHANGE UP (ref 1.4–6.5)
NEUTROPHILS NFR BLD AUTO: 57.2 % — SIGNIFICANT CHANGE UP (ref 42.2–75.2)
NRBC # BLD: 0 /100 WBCS — SIGNIFICANT CHANGE UP (ref 0–0)
NT-PROBNP SERPL-SCNC: 41 PG/ML — SIGNIFICANT CHANGE UP (ref 0–300)
PCO2 BLDV: 42 MMHG — SIGNIFICANT CHANGE UP (ref 39–42)
PH BLDV: 7.45 — HIGH (ref 7.32–7.43)
PLATELET # BLD AUTO: 322 K/UL — SIGNIFICANT CHANGE UP (ref 130–400)
PMV BLD: 9.3 FL — SIGNIFICANT CHANGE UP (ref 7.4–10.4)
PO2 BLDV: 47 MMHG — HIGH (ref 25–45)
POTASSIUM BLDV-SCNC: 3.7 MMOL/L — SIGNIFICANT CHANGE UP (ref 3.5–5.1)
POTASSIUM SERPL-MCNC: 5.7 MMOL/L — HIGH (ref 3.5–5)
POTASSIUM SERPL-SCNC: 5.7 MMOL/L — HIGH (ref 3.5–5)
PROT SERPL-MCNC: 6.9 G/DL — SIGNIFICANT CHANGE UP (ref 6–8)
RBC # BLD: 4.95 M/UL — SIGNIFICANT CHANGE UP (ref 4.2–5.4)
RBC # FLD: 13.8 % — SIGNIFICANT CHANGE UP (ref 11.5–14.5)
SAO2 % BLDV: 82.7 % — SIGNIFICANT CHANGE UP (ref 67–88)
SODIUM SERPL-SCNC: 138 MMOL/L — SIGNIFICANT CHANGE UP (ref 135–146)
TROPONIN T, HIGH SENSITIVITY RESULT: <6 NG/L — SIGNIFICANT CHANGE UP (ref 6–13)
WBC # BLD: 6.79 K/UL — SIGNIFICANT CHANGE UP (ref 4.8–10.8)
WBC # FLD AUTO: 6.79 K/UL — SIGNIFICANT CHANGE UP (ref 4.8–10.8)

## 2024-03-30 PROCEDURE — 71045 X-RAY EXAM CHEST 1 VIEW: CPT | Mod: 26

## 2024-03-30 PROCEDURE — 93010 ELECTROCARDIOGRAM REPORT: CPT

## 2024-03-30 PROCEDURE — 36415 COLL VENOUS BLD VENIPUNCTURE: CPT

## 2024-03-30 PROCEDURE — 93005 ELECTROCARDIOGRAM TRACING: CPT

## 2024-03-30 PROCEDURE — 85025 COMPLETE CBC W/AUTO DIFF WBC: CPT

## 2024-03-30 PROCEDURE — 99285 EMERGENCY DEPT VISIT HI MDM: CPT

## 2024-03-30 PROCEDURE — 83735 ASSAY OF MAGNESIUM: CPT

## 2024-03-30 PROCEDURE — 97161 PT EVAL LOW COMPLEX 20 MIN: CPT | Mod: GP

## 2024-03-30 PROCEDURE — 80048 BASIC METABOLIC PNL TOTAL CA: CPT

## 2024-03-30 PROCEDURE — 94640 AIRWAY INHALATION TREATMENT: CPT

## 2024-03-30 PROCEDURE — 80053 COMPREHEN METABOLIC PANEL: CPT

## 2024-03-30 RX ORDER — AZITHROMYCIN 500 MG/1
500 TABLET, FILM COATED ORAL ONCE
Refills: 0 | Status: COMPLETED | OUTPATIENT
Start: 2024-03-30 | End: 2024-03-30

## 2024-03-30 RX ORDER — MEPOLIZUMAB 100 MG/ML
0 INJECTION, SOLUTION SUBCUTANEOUS
Refills: 0 | DISCHARGE

## 2024-03-30 RX ORDER — ENOXAPARIN SODIUM 100 MG/ML
40 INJECTION SUBCUTANEOUS EVERY 24 HOURS
Refills: 0 | Status: DISCONTINUED | OUTPATIENT
Start: 2024-03-30 | End: 2024-04-01

## 2024-03-30 RX ORDER — MIRABEGRON 50 MG/1
1 TABLET, EXTENDED RELEASE ORAL
Refills: 0 | DISCHARGE

## 2024-03-30 RX ORDER — BUDESONIDE AND FORMOTEROL FUMARATE DIHYDRATE 160; 4.5 UG/1; UG/1
2 AEROSOL RESPIRATORY (INHALATION)
Refills: 0 | Status: DISCONTINUED | OUTPATIENT
Start: 2024-03-30 | End: 2024-04-01

## 2024-03-30 RX ORDER — MONTELUKAST 4 MG/1
1 TABLET, CHEWABLE ORAL
Refills: 0 | DISCHARGE

## 2024-03-30 RX ORDER — CHOLECALCIFEROL (VITAMIN D3) 125 MCG
2000 CAPSULE ORAL DAILY
Refills: 0 | Status: DISCONTINUED | OUTPATIENT
Start: 2024-03-30 | End: 2024-04-01

## 2024-03-30 RX ORDER — CHOLECALCIFEROL (VITAMIN D3) 125 MCG
1 CAPSULE ORAL
Refills: 0 | DISCHARGE

## 2024-03-30 RX ORDER — TRAZODONE HCL 50 MG
1 TABLET ORAL
Refills: 0 | DISCHARGE

## 2024-03-30 RX ORDER — ANASTROZOLE 1 MG/1
1 TABLET ORAL DAILY
Refills: 0 | Status: DISCONTINUED | OUTPATIENT
Start: 2024-03-30 | End: 2024-04-01

## 2024-03-30 RX ORDER — IPRATROPIUM/ALBUTEROL SULFATE 18-103MCG
3 AEROSOL WITH ADAPTER (GRAM) INHALATION EVERY 4 HOURS
Refills: 0 | Status: DISCONTINUED | OUTPATIENT
Start: 2024-03-30 | End: 2024-04-01

## 2024-03-30 RX ORDER — ANASTROZOLE 1 MG/1
1 TABLET ORAL
Refills: 0 | DISCHARGE

## 2024-03-30 RX ORDER — MONTELUKAST 4 MG/1
10 TABLET, CHEWABLE ORAL DAILY
Refills: 0 | Status: DISCONTINUED | OUTPATIENT
Start: 2024-03-30 | End: 2024-04-01

## 2024-03-30 RX ORDER — SIMVASTATIN 20 MG/1
20 TABLET, FILM COATED ORAL AT BEDTIME
Refills: 0 | Status: DISCONTINUED | OUTPATIENT
Start: 2024-03-30 | End: 2024-04-01

## 2024-03-30 RX ORDER — TIOTROPIUM BROMIDE 18 UG/1
2 CAPSULE ORAL; RESPIRATORY (INHALATION) DAILY
Refills: 0 | Status: DISCONTINUED | OUTPATIENT
Start: 2024-03-30 | End: 2024-04-01

## 2024-03-30 RX ORDER — ALBUTEROL 90 UG/1
2 AEROSOL, METERED ORAL EVERY 6 HOURS
Refills: 0 | Status: DISCONTINUED | OUTPATIENT
Start: 2024-03-30 | End: 2024-04-01

## 2024-03-30 RX ORDER — SIMVASTATIN 20 MG/1
1 TABLET, FILM COATED ORAL
Qty: 0 | Refills: 0 | DISCHARGE

## 2024-03-30 RX ORDER — LANOLIN ALCOHOL/MO/W.PET/CERES
5 CREAM (GRAM) TOPICAL ONCE
Refills: 0 | Status: COMPLETED | OUTPATIENT
Start: 2024-03-30 | End: 2024-03-30

## 2024-03-30 RX ORDER — IPRATROPIUM/ALBUTEROL SULFATE 18-103MCG
3 AEROSOL WITH ADAPTER (GRAM) INHALATION
Refills: 0 | Status: COMPLETED | OUTPATIENT
Start: 2024-03-30 | End: 2024-03-30

## 2024-03-30 RX ORDER — PANTOPRAZOLE SODIUM 20 MG/1
40 TABLET, DELAYED RELEASE ORAL
Refills: 0 | Status: DISCONTINUED | OUTPATIENT
Start: 2024-03-30 | End: 2024-04-01

## 2024-03-30 RX ORDER — MAGNESIUM SULFATE 500 MG/ML
2 VIAL (ML) INJECTION ONCE
Refills: 0 | Status: COMPLETED | OUTPATIENT
Start: 2024-03-30 | End: 2024-03-30

## 2024-03-30 RX ADMIN — Medication 3 MILLILITER(S): at 18:43

## 2024-03-30 RX ADMIN — Medication 125 MILLIGRAM(S): at 18:43

## 2024-03-30 RX ADMIN — Medication 150 GRAM(S): at 18:43

## 2024-03-30 RX ADMIN — Medication 3 MILLILITER(S): at 19:30

## 2024-03-30 RX ADMIN — Medication 5 MILLIGRAM(S): at 23:30

## 2024-03-30 RX ADMIN — Medication 3 MILLILITER(S): at 20:40

## 2024-03-30 RX ADMIN — AZITHROMYCIN 255 MILLIGRAM(S): 500 TABLET, FILM COATED ORAL at 20:14

## 2024-03-30 NOTE — ED PROVIDER NOTE - EKG/XRAY ADDITIONAL INFORMATION
ED CXR prelim, my independent interpretation - Dr. Gloria Sampson: [No PTX, No infiltrates, No Free air]  ED EKG: my independent interpretation - Dr. Gloria Sampson : [72 NSR, no STEMI]

## 2024-03-30 NOTE — H&P ADULT - ASSESSMENT
Pt is a 65 yo F, with PMHx of asthma s/p ICU admission 12/2023 (+bipap, no prior intubation), HLD, breast cancer s/p mastectomy/chemo/radiation presents to ED c/o SOB and wheezing c/w prior asthma exac not relieved by at home nebs/inhaler. no sick contacts or recent travel. Denies fever/chill/HA/dizziness/chest pain/palpitation/abd pain/n/v/d/ black stool/bloody stool/urinary sxs.    In the ED:  - Vitals: /75, HR 80, T 98F, Sp02 94% on RA  - Labs notable for: K 5.7 (hemolyzed), troponin negative  - CXR - no focal opacity (on my read)    She was given duonebs, magnesium, solumedrol 125mg and zithromax.     #asthma exacerbation  - s/p magnesium, solumedrol and duonebs in the ED  - start solumedrol 60mg BID  - duonebs q4 and PRN  - continue with singulair  - continue with home inhalers  - O2 PRN  - hold off on further abx for now   Pt is a 63 yo F, with PMHx of asthma s/p prior ICU admission in 12/2023 on BiPAP, no prior intubation, HLD, breast CA s/p mastectomy/chemo and radiation, presented to the ED complaining of 1 day history of persistent SOB, wheezing, and chest tightness.    #asthma exacerbation  #h/o ICU admission for asthma exacerbation, never intubated  - s/p magnesium, solumedrol, duonebs and zithromax in the ED  - c/w solumedrol 60mg BID and taper based on clinical improvement  - duonebs q4 and PRN  - monitor O2 sats  - continue with singulair  - continue with advair (symbicort here) and spiriva  - hold off on further abx for now- no opacity on cxr on my read, no leukocytosis or fevers    #hyperkalemia (hemolyzed)  - repeat BMP     #HLD  - continue with simvastatin    #breast CA s/p mastectomy/chemo/radiation  - continue with anastrazole 1mg qD  - Nucala injections qmonth  - OP Follow up    #Misc:  - DVT ppx: lovenox  - GI ppx: protonix  - Diet: DASH  - Activity: IAT  - Dispo: medicine

## 2024-03-30 NOTE — ED PROVIDER NOTE - ATTENDING APP SHARED VISIT CONTRIBUTION OF CARE
64-year-old female past medical history of asthma with ICU admission in December, no prior intubation but has had BiPAP, hyperlipidemia breast cancer presents with 2 days of shortness of breath wheezing tightness.  Did multiple nebulizers at home yesterday with little relief.  Patient thinks it might be allergy related because she has itchy eyes.  No fever cough runny nose sore throat.  No chest pain.  No leg pain swelling immobilization hormones hemoptysis.  Non-smoker.    On exam, AFVSS, Well appearing, No acute distress, NCAT, EOMI, PERRLA, MMM, Neck supple, LCTAB, RRR nl s1s2 No mrg, Abdomen Soft NTND, AAOx3, No Focal Deficits, No LE edema or calf TTP,    A/P; acute asthma exacerbation, lungs clear however patient is tight, will give nebs steroids mag, get labs chest x-ray EKG troponin BNP D-dimer VBG reeval

## 2024-03-30 NOTE — ED PROVIDER NOTE - OBJECTIVE STATEMENT
pt with pmhx asthma s/p ICU admission 12/2023 (+bipap, no prior intubation), HLD, breast cancer s/p mastectomy/chemo/radiation presents to ED c/o SOB and wheezing c/w prior asthma exac not relieved by at home nebs/inhaler. no sick contacts or recent travel. Denies fever/chill/HA/dizziness/chest pain/palpitation/abd pain/n/v/d/ black stool/bloody stool/urinary sxs

## 2024-03-30 NOTE — ED ADULT NURSE NOTE - NSFALLUNIVINTERV_ED_ALL_ED
Bed/Stretcher in lowest position, wheels locked, appropriate side rails in place/Call bell, personal items and telephone in reach/Instruct patient to call for assistance before getting out of bed/chair/stretcher/Non-slip footwear applied when patient is off stretcher/[place] to call system/Physically safe environment - no spills, clutter or unnecessary equipment/Purposeful proactive rounding/Room/bathroom lighting operational, light cord in reach

## 2024-03-30 NOTE — H&P ADULT - NSHPPHYSICALEXAM_GEN_ALL_CORE
T(C): 36.7 (03-30-24 @ 20:15), Max: 36.7 (03-30-24 @ 17:22)  HR: 76 (03-30-24 @ 20:15) (76 - 80)  BP: 130/74 (03-30-24 @ 20:15) (113/75 - 130/74)  RR: 20 (03-30-24 @ 20:15) (20 - 20)  SpO2: 95% (03-30-24 @ 20:15) (94% - 95%)    PHYSICAL EXAM:  GENERAL: patient appears well, no acute distress, appropriate, pleasant  EYES: sclera clear, no exudates  ENMT: oropharynx clear without erythema, no exudates, moist mucous membranes  NECK: supple, soft, no thyromegaly noted  LUNGS: mild wheezing at lung bases  HEART: soft S1/S2, regular rate and rhythm, no murmurs noted, no lower extremity edema  GASTROINTESTINAL: abdomen is soft, nontender, nondistended, normoactive bowel sounds, no palpable masses  INTEGUMENT: good skin turgor, no lesions noted  MUSCULOSKELETAL: no clubbing or cyanosis, no obvious deformity  NEUROLOGIC: awake, alert, oriented x3, good muscle tone in 4 extremities, no obvious sensory deficits

## 2024-03-30 NOTE — ED ADULT TRIAGE NOTE - IDEAL BODY WEIGHT(KG)
59 Cellcept Pregnancy And Lactation Text: This medication is Pregnancy Category D and isn't considered safe during pregnancy. It is unknown if this medication is excreted in breast milk.

## 2024-03-30 NOTE — ED ADULT NURSE NOTE - OBJECTIVE STATEMENT
Pt c/o SOB x 2 days and today started having chest pressure/ tightness. Constant pain with no radiation, no dizziness or syncopal episodes. Reports using her inhaler at home but not improving her sx. Denied any cough or fever.

## 2024-03-30 NOTE — H&P ADULT - NSHPREVIEWOFSYSTEMS_GEN_ALL_CORE
REVIEW OF SYSTEMS:    CONSTITUTIONAL: + weakness. No fevers or chills  EYES/ENT: No visual changes;  No vertigo or throat pain   NECK: No pain or stiffness  RESPIRATORY: + wheezing, SOB. No hemoptysis  CARDIOVASCULAR: + chest tightness  GASTROINTESTINAL: No abdominal or epigastric pain. No nausea, vomiting, or hematemesis; No diarrhea or constipation. No melena or hematochezia.  GENITOURINARY: No dysuria, frequency or hematuria  NEUROLOGICAL: No numbness. + weakness  SKIN: No itching, rashes

## 2024-03-30 NOTE — H&P ADULT - NSHPLABSRESULTS_GEN_ALL_CORE
14.8   6.79  )-----------( 322      ( 30 Mar 2024 18:41 )             43.8       03-30    138  |  103  |  13  ----------------------------<  122<H>  5.7<H>   |  27  |  0.8    Ca    9.4      30 Mar 2024 18:41    TPro  6.9  /  Alb  4.3  /  TBili  <0.2  /  DBili  x   /  AST  43<H>  /  ALT  21  /  AlkPhos  78  03-30              Urinalysis Basic - ( 30 Mar 2024 18:41 )    Color: x / Appearance: x / SG: x / pH: x  Gluc: 122 mg/dL / Ketone: x  / Bili: x / Urobili: x   Blood: x / Protein: x / Nitrite: x   Leuk Esterase: x / RBC: x / WBC x   Sq Epi: x / Non Sq Epi: x / Bacteria: x

## 2024-03-30 NOTE — ED PROVIDER NOTE - CLINICAL SUMMARY MEDICAL DECISION MAKING FREE TEXT BOX
Patient still feels tight with poor air entry would prefer to stay for further management of asthma exacerbation, will admit to medicine    Labs and EKG were ordered and reviewed.  Imaging was ordered and reviewed by me.  Appropriate medications for patient's presenting complaints were ordered and effects were reassessed.  Patient's records (prior hospital, ED visit, and/or nursing home notes if available) were reviewed.  Additional history was obtained from EMS, family, and/or PCP (where available).  Escalation to admission/observation was considered.  Patient requires inpatient hospitalization - monitored setting.      ED CXR prelim, my independent interpretation - Dr. Gloria Sampson: [No PTX, No infiltrates, No Free air]  ED EKG: my independent interpretation - Dr. Gloria Sampson : [72 NSR, no STEMI]

## 2024-03-30 NOTE — ED PROVIDER NOTE - IV ALTEPLASE ADMIN OUTSIDE HIDDEN
You have been seen for neck pain and concussion-like symptoms  Please take tylenol and motrin as needed for discomfort  Return to the emergency department if you develop worsening headaches, weakness/numbness/tingling, neck pain, persistent vomiting or any other symptoms of concern  Please follow up with your PCP and PT by calling the number provided  show

## 2024-03-30 NOTE — H&P ADULT - ATTENDING COMMENTS
65 yo F, with PMHx of asthma s/p prior ICU admission in 12/2023 on BiPAP, no prior intubation, HLD, breast CA s/p mastectomy/chemo and radiation, presented to the ED complaining of 1 day history of persistent SOB, wheezing, and chest tightness.    Vital Signs Last 24 Hrs  T(C): 36.3 (31 Mar 2024 05:00), Max: 36.7 (30 Mar 2024 17:22)  T(F): 97.3 (31 Mar 2024 05:00), Max: 98 (30 Mar 2024 17:22)  HR: 70 (31 Mar 2024 05:00) (70 - 80)  BP: 93/54 (31 Mar 2024 05:00) (93/54 - 130/74)  RR: 18 (31 Mar 2024 05:00) (18 - 20)  SpO2: 97% (31 Mar 2024 05:00) (94% - 97%)    Parameters below as of 30 Mar 2024 20:15  Patient On (Oxygen Delivery Method): room air    GEN: No acute distress  LUNGS: Clear to auscultation bilaterally   HEART: S1/S2 present. RRR.   ABD/ GI: Soft, non-tender, non-distended. Bowel sounds present  EXT: NC/NC/NE/2+PP/DUARTE  NEURO: AAOX3    Assesment:    Acute asthma exacerbation  Hyperkalemia  HLD  Breast ca s/p mastectomy, chemo and RT, on AI, and Nucala    Plan:    Continue solumedrol 60mg BID. Taper once clinically improved.   C/w duonebs, symbicort, spiriva  Pulm follow up   Monitor off abx  Monitor BMP  Hemonc f/u o/p; c/w anastrazole and Nucala as scheduled.     Pending: clinical improvement, BMP f/u  From home.    Pt seen in 3/31/24   *INCOMPLETE* 65 yo F, with PMHx of asthma s/p prior ICU admission in 12/2023 on BiPAP, no prior intubation, HLD, breast CA s/p mastectomy/chemo and radiation, presented to the ED complaining of 1 day history of persistent SOB, wheezing, and chest tightness.    Vital Signs Last 24 Hrs  T(C): 36.3 (31 Mar 2024 05:00), Max: 36.7 (30 Mar 2024 17:22)  T(F): 97.3 (31 Mar 2024 05:00), Max: 98 (30 Mar 2024 17:22)  HR: 70 (31 Mar 2024 05:00) (70 - 80)  BP: 93/54 (31 Mar 2024 05:00) (93/54 - 130/74)  RR: 18 (31 Mar 2024 05:00) (18 - 20)  SpO2: 97% (31 Mar 2024 05:00) (94% - 97%)    Parameters below as of 30 Mar 2024 20:15  Patient On (Oxygen Delivery Method): room air    GEN: No acute distress  LUNGS: Dec air entry b/l however no to little wheezing  HEART: S1/S2 present. RRR.   ABD/ GI: Soft, non-tender, non-distended. Bowel sounds present  EXT: No edema  NEURO: AAOX3    Assesment:    Acute asthma exacerbation  Hyperkalemia  HLD  Breast ca s/p mastectomy, chemo and RT, on AI, and Nucala    Plan:    Continue solumedrol 60mg BID. Taper once clinically improved. PPI with steroids  C/w duonebs, symbicort, spiriva  Pulm follow up if clinically not imrpoving  Monitor off abx  Monitor BMP  Hemonc f/u o/p; c/w anastrazole and Nucala as scheduled.     PT eval    Pending: clinical improvement, BMP f/u; Anticipate DC in 24-48 hours  From home.    Pt seen in 3/31/24

## 2024-03-30 NOTE — ED PROVIDER NOTE - PROGRESS NOTE DETAILS
pt does not feel comfortable going home, still feels tight. decreased air entry, given nebs/steroids/abx 2/2 ?R opacity, mag

## 2024-03-30 NOTE — ED PROVIDER NOTE - PHYSICAL EXAMINATION
CONSTITUTIONAL: Well-appearing; well-nourished; in no apparent distress.   NECK: Supple; non-tender; no cervical lymphadenopathy.   CARDIOVASCULAR: Normal S1, S2; no murmurs, rubs, or gallops.   RESPIRATORY: decreased air entry, but no obvious wheezing/rronchi/rales.   GI/: Normal bowel sounds; non-distended; non-tender; no palpable organomegaly.   MS: No evidence of trauma or deformity. Normal ROM in all four extremities; non-tender to palpation; distal pulses are normal.   SKIN: Normal for age and race; warm; dry; good turgor; no apparent lesions or exudate.   NEURO/PSYCH: A & O x 4; grossly unremarkable. mood and manner are appropriate.

## 2024-03-30 NOTE — H&P ADULT - HISTORY OF PRESENT ILLNESS
Pt is a 65 yo F, with PMHx of asthma s/p ICU admission 12/2023 (+bipap, no prior intubation), HLD, breast cancer s/p mastectomy/chemo/radiation presents to ED c/o SOB and wheezing c/w prior asthma exac not relieved by at home nebs/inhaler. no sick contacts or recent travel. Denies fever/chill/HA/dizziness/chest pain/palpitation/abd pain/n/v/d/ black stool/bloody stool/urinary sxs.    In the ED:  - Vitals: /75, HR 80, T 98F, Sp02 94% on RA  - Labs notable for:  Pt is a 63 yo F, with PMHx of asthma s/p ICU admission 12/2023 (+bipap, no prior intubation), HLD, breast cancer s/p mastectomy/chemo/radiation presents to ED c/o SOB and wheezing c/w prior asthma exac not relieved by at home nebs/inhaler. no sick contacts or recent travel. Denies fever/chill/HA/dizziness/chest pain/palpitation/abd pain/n/v/d/ black stool/bloody stool/urinary sxs.    In the ED:  - Vitals: /75, HR 80, T 98F, Sp02 94% on RA  - Labs notable for: K 5.7 (hemolyzed), troponin negative  - CXR - no focal opacity (on my read)    She was given duonebs, magnesium, solumedrol 125mg and zithromax.     Pt will be admitted to medicine for asthma exacerbation Pt is a 63 yo F, with PMHx of asthma s/p prior ICU admission in 12/2023 on BiPAP, no prior intubation, HLD, breast CA s/p mastectomy/chemo and radiation, presented to the ED complaining of 1 day history of persistent SOB, wheezing, and chest tightness. She reported that yesterday, she started developing chest and back tightness, nonradiating to the arm or neck, with associated SOB, and wheezing. She tried using her albuterol and nebulizer treatment multiple times with no improvement. Today, she felt a bit better but her symptoms persisted and she felt weak so she came into the ED. No recent sick contacts, travel, fevers, chills, palpitations, HA, abdominal pain, urinary symptoms, numbness, tingling or weakness. Pt reports that all of her symptoms she had was similar to all her prior asthma exacerbations.     In the ED:  - Vitals: /75, HR 80, T 98F, Sp02 94% on RA  - Labs notable for: K 5.7 (hemolyzed), troponin negative  - CXR - no focal opacity (on my read)    She was given duonebs, magnesium, solumedrol 125mg and zithromax.     Pt will be admitted to medicine for asthma exacerbation

## 2024-03-30 NOTE — ED ADULT NURSE NOTE - ALCOHOL PRE SCREEN (AUDIT - C)
This is an automatic interchange a s per hospital policy of standard influenza vaccine to high-dose influenza vaccine
Statement Selected

## 2024-03-31 LAB
ALBUMIN SERPL ELPH-MCNC: 4.5 G/DL — SIGNIFICANT CHANGE UP (ref 3.5–5.2)
ALP SERPL-CCNC: 91 U/L — SIGNIFICANT CHANGE UP (ref 30–115)
ALT FLD-CCNC: 18 U/L — SIGNIFICANT CHANGE UP (ref 0–41)
ANION GAP SERPL CALC-SCNC: 15 MMOL/L — HIGH (ref 7–14)
ANION GAP SERPL CALC-SCNC: 16 MMOL/L — HIGH (ref 7–14)
AST SERPL-CCNC: 16 U/L — SIGNIFICANT CHANGE UP (ref 0–41)
BASOPHILS # BLD AUTO: 0.01 K/UL — SIGNIFICANT CHANGE UP (ref 0–0.2)
BASOPHILS NFR BLD AUTO: 0.1 % — SIGNIFICANT CHANGE UP (ref 0–1)
BILIRUB SERPL-MCNC: 0.2 MG/DL — SIGNIFICANT CHANGE UP (ref 0.2–1.2)
BUN SERPL-MCNC: 11 MG/DL — SIGNIFICANT CHANGE UP (ref 10–20)
BUN SERPL-MCNC: 16 MG/DL — SIGNIFICANT CHANGE UP (ref 10–20)
CALCIUM SERPL-MCNC: 9.7 MG/DL — SIGNIFICANT CHANGE UP (ref 8.4–10.5)
CALCIUM SERPL-MCNC: 9.9 MG/DL — SIGNIFICANT CHANGE UP (ref 8.4–10.5)
CHLORIDE SERPL-SCNC: 101 MMOL/L — SIGNIFICANT CHANGE UP (ref 98–110)
CHLORIDE SERPL-SCNC: 103 MMOL/L — SIGNIFICANT CHANGE UP (ref 98–110)
CO2 SERPL-SCNC: 23 MMOL/L — SIGNIFICANT CHANGE UP (ref 17–32)
CO2 SERPL-SCNC: 24 MMOL/L — SIGNIFICANT CHANGE UP (ref 17–32)
CREAT SERPL-MCNC: 0.6 MG/DL — LOW (ref 0.7–1.5)
CREAT SERPL-MCNC: 0.8 MG/DL — SIGNIFICANT CHANGE UP (ref 0.7–1.5)
EGFR: 100 ML/MIN/1.73M2 — SIGNIFICANT CHANGE UP
EGFR: 82 ML/MIN/1.73M2 — SIGNIFICANT CHANGE UP
EOSINOPHIL # BLD AUTO: 0 K/UL — SIGNIFICANT CHANGE UP (ref 0–0.7)
EOSINOPHIL NFR BLD AUTO: 0 % — SIGNIFICANT CHANGE UP (ref 0–8)
GLUCOSE SERPL-MCNC: 148 MG/DL — HIGH (ref 70–99)
GLUCOSE SERPL-MCNC: 197 MG/DL — HIGH (ref 70–99)
HCT VFR BLD CALC: 45.1 % — SIGNIFICANT CHANGE UP (ref 37–47)
HGB BLD-MCNC: 15.6 G/DL — SIGNIFICANT CHANGE UP (ref 12–16)
IMM GRANULOCYTES NFR BLD AUTO: 0.6 % — HIGH (ref 0.1–0.3)
LYMPHOCYTES # BLD AUTO: 1.45 K/UL — SIGNIFICANT CHANGE UP (ref 1.2–3.4)
LYMPHOCYTES # BLD AUTO: 15.4 % — LOW (ref 20.5–51.1)
MAGNESIUM SERPL-MCNC: 2.3 MG/DL — SIGNIFICANT CHANGE UP (ref 1.8–2.4)
MCHC RBC-ENTMCNC: 29.8 PG — SIGNIFICANT CHANGE UP (ref 27–31)
MCHC RBC-ENTMCNC: 34.6 G/DL — SIGNIFICANT CHANGE UP (ref 32–37)
MCV RBC AUTO: 86.2 FL — SIGNIFICANT CHANGE UP (ref 81–99)
MONOCYTES # BLD AUTO: 0.05 K/UL — LOW (ref 0.1–0.6)
MONOCYTES NFR BLD AUTO: 0.5 % — LOW (ref 1.7–9.3)
NEUTROPHILS # BLD AUTO: 7.84 K/UL — HIGH (ref 1.4–6.5)
NEUTROPHILS NFR BLD AUTO: 83.4 % — HIGH (ref 42.2–75.2)
NRBC # BLD: 0 /100 WBCS — SIGNIFICANT CHANGE UP (ref 0–0)
PLATELET # BLD AUTO: 336 K/UL — SIGNIFICANT CHANGE UP (ref 130–400)
PMV BLD: 9.5 FL — SIGNIFICANT CHANGE UP (ref 7.4–10.4)
POTASSIUM SERPL-MCNC: 4.2 MMOL/L — SIGNIFICANT CHANGE UP (ref 3.5–5)
POTASSIUM SERPL-MCNC: 4.5 MMOL/L — SIGNIFICANT CHANGE UP (ref 3.5–5)
POTASSIUM SERPL-SCNC: 4.2 MMOL/L — SIGNIFICANT CHANGE UP (ref 3.5–5)
POTASSIUM SERPL-SCNC: 4.5 MMOL/L — SIGNIFICANT CHANGE UP (ref 3.5–5)
PROT SERPL-MCNC: 7.1 G/DL — SIGNIFICANT CHANGE UP (ref 6–8)
RBC # BLD: 5.23 M/UL — SIGNIFICANT CHANGE UP (ref 4.2–5.4)
RBC # FLD: 13.8 % — SIGNIFICANT CHANGE UP (ref 11.5–14.5)
SODIUM SERPL-SCNC: 140 MMOL/L — SIGNIFICANT CHANGE UP (ref 135–146)
SODIUM SERPL-SCNC: 142 MMOL/L — SIGNIFICANT CHANGE UP (ref 135–146)
WBC # BLD: 9.41 K/UL — SIGNIFICANT CHANGE UP (ref 4.8–10.8)
WBC # FLD AUTO: 9.41 K/UL — SIGNIFICANT CHANGE UP (ref 4.8–10.8)

## 2024-03-31 PROCEDURE — 99232 SBSQ HOSP IP/OBS MODERATE 35: CPT

## 2024-03-31 PROCEDURE — 93010 ELECTROCARDIOGRAM REPORT: CPT

## 2024-03-31 RX ORDER — INFLUENZA VIRUS VACCINE 15; 15; 15; 15 UG/.5ML; UG/.5ML; UG/.5ML; UG/.5ML
0.7 SUSPENSION INTRAMUSCULAR ONCE
Refills: 0 | Status: DISCONTINUED | OUTPATIENT
Start: 2024-03-31 | End: 2024-04-01

## 2024-03-31 RX ORDER — SALICYLIC ACID 0.5 %
1 CLEANSER (GRAM) TOPICAL EVERY 6 HOURS
Refills: 0 | Status: DISCONTINUED | OUTPATIENT
Start: 2024-03-31 | End: 2024-04-01

## 2024-03-31 RX ORDER — PETROLATUM,WHITE
1 JELLY (GRAM) TOPICAL
Refills: 0 | Status: DISCONTINUED | OUTPATIENT
Start: 2024-03-31 | End: 2024-04-01

## 2024-03-31 RX ADMIN — Medication 3 MILLILITER(S): at 12:04

## 2024-03-31 RX ADMIN — ENOXAPARIN SODIUM 40 MILLIGRAM(S): 100 INJECTION SUBCUTANEOUS at 05:40

## 2024-03-31 RX ADMIN — TIOTROPIUM BROMIDE 2 PUFF(S): 18 CAPSULE ORAL; RESPIRATORY (INHALATION) at 08:05

## 2024-03-31 RX ADMIN — Medication 60 MILLIGRAM(S): at 18:49

## 2024-03-31 RX ADMIN — PANTOPRAZOLE SODIUM 40 MILLIGRAM(S): 20 TABLET, DELAYED RELEASE ORAL at 05:39

## 2024-03-31 RX ADMIN — Medication 1 APPLICATION(S): at 18:48

## 2024-03-31 RX ADMIN — Medication 3 MILLILITER(S): at 16:16

## 2024-03-31 RX ADMIN — Medication 1 APPLICATION(S): at 18:49

## 2024-03-31 RX ADMIN — Medication 2000 UNIT(S): at 12:19

## 2024-03-31 RX ADMIN — Medication 3 MILLILITER(S): at 19:57

## 2024-03-31 RX ADMIN — Medication 1 DROP(S): at 12:20

## 2024-03-31 RX ADMIN — Medication 3 MILLILITER(S): at 12:30

## 2024-03-31 RX ADMIN — SIMVASTATIN 20 MILLIGRAM(S): 20 TABLET, FILM COATED ORAL at 23:56

## 2024-03-31 RX ADMIN — Medication 3 MILLILITER(S): at 07:38

## 2024-03-31 RX ADMIN — MONTELUKAST 10 MILLIGRAM(S): 4 TABLET, CHEWABLE ORAL at 12:19

## 2024-03-31 RX ADMIN — BUDESONIDE AND FORMOTEROL FUMARATE DIHYDRATE 2 PUFF(S): 160; 4.5 AEROSOL RESPIRATORY (INHALATION) at 08:48

## 2024-03-31 RX ADMIN — ANASTROZOLE 1 MILLIGRAM(S): 1 TABLET ORAL at 18:47

## 2024-03-31 RX ADMIN — Medication 1 APPLICATION(S): at 23:57

## 2024-03-31 RX ADMIN — Medication 60 MILLIGRAM(S): at 05:40

## 2024-03-31 NOTE — PATIENT PROFILE ADULT - FALL HARM RISK - HARM RISK INTERVENTIONS

## 2024-04-01 ENCOUNTER — TRANSCRIPTION ENCOUNTER (OUTPATIENT)
Age: 64
End: 2024-04-01

## 2024-04-01 VITALS
OXYGEN SATURATION: 94 % | RESPIRATION RATE: 18 BRPM | SYSTOLIC BLOOD PRESSURE: 111 MMHG | DIASTOLIC BLOOD PRESSURE: 59 MMHG | HEART RATE: 80 BPM | TEMPERATURE: 98 F

## 2024-04-01 LAB
ALBUMIN SERPL ELPH-MCNC: 4.3 G/DL — SIGNIFICANT CHANGE UP (ref 3.5–5.2)
ALP SERPL-CCNC: 90 U/L — SIGNIFICANT CHANGE UP (ref 30–115)
ALT FLD-CCNC: 16 U/L — SIGNIFICANT CHANGE UP (ref 0–41)
ANION GAP SERPL CALC-SCNC: 17 MMOL/L — HIGH (ref 7–14)
AST SERPL-CCNC: 14 U/L — SIGNIFICANT CHANGE UP (ref 0–41)
BASOPHILS # BLD AUTO: 0 K/UL — SIGNIFICANT CHANGE UP (ref 0–0.2)
BASOPHILS NFR BLD AUTO: 0 % — SIGNIFICANT CHANGE UP (ref 0–1)
BILIRUB SERPL-MCNC: <0.2 MG/DL — SIGNIFICANT CHANGE UP (ref 0.2–1.2)
BUN SERPL-MCNC: 24 MG/DL — HIGH (ref 10–20)
CALCIUM SERPL-MCNC: 9.6 MG/DL — SIGNIFICANT CHANGE UP (ref 8.4–10.5)
CHLORIDE SERPL-SCNC: 102 MMOL/L — SIGNIFICANT CHANGE UP (ref 98–110)
CO2 SERPL-SCNC: 22 MMOL/L — SIGNIFICANT CHANGE UP (ref 17–32)
CREAT SERPL-MCNC: 0.7 MG/DL — SIGNIFICANT CHANGE UP (ref 0.7–1.5)
EGFR: 96 ML/MIN/1.73M2 — SIGNIFICANT CHANGE UP
EOSINOPHIL # BLD AUTO: 0 K/UL — SIGNIFICANT CHANGE UP (ref 0–0.7)
EOSINOPHIL NFR BLD AUTO: 0 % — SIGNIFICANT CHANGE UP (ref 0–8)
GLUCOSE SERPL-MCNC: 120 MG/DL — HIGH (ref 70–99)
HCT VFR BLD CALC: 41 % — SIGNIFICANT CHANGE UP (ref 37–47)
HGB BLD-MCNC: 14.2 G/DL — SIGNIFICANT CHANGE UP (ref 12–16)
LYMPHOCYTES # BLD AUTO: 1.91 K/UL — SIGNIFICANT CHANGE UP (ref 1.2–3.4)
LYMPHOCYTES # BLD AUTO: 10.3 % — LOW (ref 20.5–51.1)
MAGNESIUM SERPL-MCNC: 2.3 MG/DL — SIGNIFICANT CHANGE UP (ref 1.8–2.4)
MANUAL SMEAR VERIFICATION: SIGNIFICANT CHANGE UP
MCHC RBC-ENTMCNC: 30 PG — SIGNIFICANT CHANGE UP (ref 27–31)
MCHC RBC-ENTMCNC: 34.6 G/DL — SIGNIFICANT CHANGE UP (ref 32–37)
MCV RBC AUTO: 86.5 FL — SIGNIFICANT CHANGE UP (ref 81–99)
MONOCYTES # BLD AUTO: 0.65 K/UL — HIGH (ref 0.1–0.6)
MONOCYTES NFR BLD AUTO: 3.5 % — SIGNIFICANT CHANGE UP (ref 1.7–9.3)
NEUTROPHILS # BLD AUTO: 15.95 K/UL — HIGH (ref 1.4–6.5)
NEUTROPHILS NFR BLD AUTO: 86.2 % — HIGH (ref 42.2–75.2)
PLAT MORPH BLD: NORMAL — SIGNIFICANT CHANGE UP
PLATELET # BLD AUTO: 377 K/UL — SIGNIFICANT CHANGE UP (ref 130–400)
PMV BLD: 9.6 FL — SIGNIFICANT CHANGE UP (ref 7.4–10.4)
POTASSIUM SERPL-MCNC: 4.5 MMOL/L — SIGNIFICANT CHANGE UP (ref 3.5–5)
POTASSIUM SERPL-SCNC: 4.5 MMOL/L — SIGNIFICANT CHANGE UP (ref 3.5–5)
PROT SERPL-MCNC: 6.9 G/DL — SIGNIFICANT CHANGE UP (ref 6–8)
RBC # BLD: 4.74 M/UL — SIGNIFICANT CHANGE UP (ref 4.2–5.4)
RBC # FLD: 14.1 % — SIGNIFICANT CHANGE UP (ref 11.5–14.5)
RBC BLD AUTO: NORMAL — SIGNIFICANT CHANGE UP
SODIUM SERPL-SCNC: 141 MMOL/L — SIGNIFICANT CHANGE UP (ref 135–146)
WBC # BLD: 18.5 K/UL — HIGH (ref 4.8–10.8)
WBC # FLD AUTO: 18.5 K/UL — HIGH (ref 4.8–10.8)

## 2024-04-01 PROCEDURE — 99239 HOSP IP/OBS DSCHRG MGMT >30: CPT

## 2024-04-01 RX ORDER — TIOTROPIUM BROMIDE 18 UG/1
1 CAPSULE ORAL; RESPIRATORY (INHALATION)
Qty: 1 | Refills: 0
Start: 2024-04-01 | End: 2024-05-30

## 2024-04-01 RX ORDER — TIOTROPIUM BROMIDE 18 UG/1
1 CAPSULE ORAL; RESPIRATORY (INHALATION)
Qty: 0 | Refills: 0 | DISCHARGE

## 2024-04-01 RX ORDER — ALBUTEROL 90 UG/1
2 AEROSOL, METERED ORAL
Refills: 0 | DISCHARGE

## 2024-04-01 RX ORDER — PANTOPRAZOLE SODIUM 20 MG/1
1 TABLET, DELAYED RELEASE ORAL
Qty: 7 | Refills: 0
Start: 2024-04-01 | End: 2024-04-07

## 2024-04-01 RX ORDER — FLUTICASONE PROPIONATE AND SALMETEROL 50; 250 UG/1; UG/1
1 POWDER ORAL; RESPIRATORY (INHALATION)
Qty: 0 | Refills: 0 | DISCHARGE

## 2024-04-01 RX ORDER — CHLORHEXIDINE GLUCONATE 213 G/1000ML
1 SOLUTION TOPICAL DAILY
Refills: 0 | Status: DISCONTINUED | OUTPATIENT
Start: 2024-04-01 | End: 2024-04-01

## 2024-04-01 RX ORDER — ALBUTEROL 90 UG/1
2 AEROSOL, METERED ORAL
Qty: 2 | Refills: 0
Start: 2024-04-01 | End: 2024-05-30

## 2024-04-01 RX ORDER — FLUTICASONE PROPIONATE AND SALMETEROL 50; 250 UG/1; UG/1
1 POWDER ORAL; RESPIRATORY (INHALATION)
Qty: 2 | Refills: 0
Start: 2024-04-01 | End: 2024-05-30

## 2024-04-01 RX ORDER — OMEPRAZOLE 10 MG/1
1 CAPSULE, DELAYED RELEASE ORAL
Refills: 0 | DISCHARGE

## 2024-04-01 RX ADMIN — TIOTROPIUM BROMIDE 2 PUFF(S): 18 CAPSULE ORAL; RESPIRATORY (INHALATION) at 10:00

## 2024-04-01 RX ADMIN — BUDESONIDE AND FORMOTEROL FUMARATE DIHYDRATE 2 PUFF(S): 160; 4.5 AEROSOL RESPIRATORY (INHALATION) at 12:30

## 2024-04-01 RX ADMIN — ANASTROZOLE 1 MILLIGRAM(S): 1 TABLET ORAL at 12:30

## 2024-04-01 RX ADMIN — Medication 2000 UNIT(S): at 12:29

## 2024-04-01 RX ADMIN — PANTOPRAZOLE SODIUM 40 MILLIGRAM(S): 20 TABLET, DELAYED RELEASE ORAL at 06:14

## 2024-04-01 RX ADMIN — Medication 1 APPLICATION(S): at 12:31

## 2024-04-01 RX ADMIN — Medication 3 MILLILITER(S): at 07:41

## 2024-04-01 RX ADMIN — Medication 1 APPLICATION(S): at 06:15

## 2024-04-01 RX ADMIN — Medication 60 MILLIGRAM(S): at 06:14

## 2024-04-01 RX ADMIN — Medication 1 APPLICATION(S): at 06:20

## 2024-04-01 RX ADMIN — Medication 1 DROP(S): at 06:14

## 2024-04-01 RX ADMIN — Medication 3 MILLILITER(S): at 12:41

## 2024-04-01 RX ADMIN — MONTELUKAST 10 MILLIGRAM(S): 4 TABLET, CHEWABLE ORAL at 12:29

## 2024-04-01 NOTE — DISCHARGE NOTE PROVIDER - CARE PROVIDER_API CALL
Charly Alvarado .  Internal Medicine  [address] [place] [address]  Phone: [phone]  Fax: [phone]  Follow Up Time: 2 weeks    Go Pozo  Pulmonary Disease  [address] [place] [address]  Phone: [phone]  Fax: [phone]  Follow Up Time: 2 weeks   Charly Alvarado .  Internal Medicine  [address] [place] [address]  Phone: [phone]  Fax: [phone]  Follow Up Time: 2 weeks    Go Pozo  Pulmonary Disease  [address] [place] [address] [address]  [place] [address]  Phone: [phone]  Fax: [phone]  Follow Up Time: 2 weeks    Fabiola Hernandez  Rheumatology  [address] [place] [address] [address]  [place] [address]  Phone: [phone]  Fax: [phone]  Follow Up Time: 1 month

## 2024-04-01 NOTE — DISCHARGE NOTE NURSING/CASE MANAGEMENT/SOCIAL WORK - PATIENT PORTAL LINK FT
You can access the FollowMyHealth Patient Portal offered by [facility] by registering at the following website: [email or web address]. By joining [employer]’s FollowMyHealth portal, you will also be able to view your health information using other applications (apps) compatible with our system.

## 2024-04-01 NOTE — DISCHARGE NOTE PROVIDER - CARE PROVIDERS DIRECT ADDRESSES
Pt was initially constipated now resolved Nausea No emesis   [email or web address]

## 2024-04-01 NOTE — DISCHARGE NOTE PROVIDER - NSDCCPCAREPLAN_GEN_ALL_CORE_FT
PRINCIPAL DISCHARGE DIAGNOSIS  Diagnosis: Acute asthma exacerbation  Assessment and Plan of Treatment: You presented to us with difficulty breathing and chest tightness. Blood work, imaging studies including chest xray were obtained. You were treated for asthma exacerbation. Your symptoms improved. Please continue to use the medications as prescribed and follow-up with pulmonology, oncology, and primary care physician as outpatient.  Return to ED in case symptoms worsen or recur.     PRINCIPAL DISCHARGE DIAGNOSIS  Diagnosis: Acute asthma exacerbation  Assessment and Plan of Treatment: You presented to us with difficulty breathing and chest tightness. Blood work, imaging studies including chest xray were obtained. You were treated for asthma exacerbation. Your symptoms improved. Please continue to use the medications as prescribed and follow-up with pulmonology, oncology, and primary care physician as outpatient.  Please follow-up with primary care physician and rheumatology as OP to evaluate further for dry eyes and rule out autoimmune conditions  Return to ED in case symptoms worsen or recur.

## 2024-04-01 NOTE — DISCHARGE NOTE PROVIDER - HOSPITAL COURSE
Pt is a 65 yo F, with PMHx of asthma s/p prior ICU admission in 12/2023 on BiPAP, no prior intubation, HLD, breast CA s/p mastectomy/chemo and radiation, presented to the ED complaining of 1 day history of persistent SOB, wheezing, and chest tightness.    For acute on chronic asthma exacerbation, patient was s/p magnesium, solumedrol, duonebs and zithromax in the ED. While admitted continued with solumedrol 60mg BID, duonebs q4 and PRN, continued with singulair, continue with advair (symbicort here) and spiriva. Was monitored off antibiotics. Symptoms improved. Patient will be d/paulette on po prednisone 40mg x 5 days. OP pulm Follow up    Patient will Follow up with onc as op for h/o Bca. Pt is a 65 yo F, with PMHx of asthma s/p prior ICU admission in 12/2023 on BiPAP, no prior intubation, HLD, breast CA s/p mastectomy/chemo and radiation, presented to the ED complaining of 1 day history of persistent SOB, wheezing, and chest tightness.    For acute on chronic asthma exacerbation, patient was s/p magnesium, solumedrol, duonebs and zithromax in the ED. While admitted continued with solumedrol 60mg BID, duonebs q4 and PRN, continued with singulair, continue with advair (symbicort here) and spiriva. Was monitored off antibiotics. Symptoms improved. Patient will be d/paulette on po prednisone 40mg x 5 days. OP pulm Follow up    Dry eyes, artificial tears, op eval    Patient will Follow up with onc as op for h/o Bca.

## 2024-04-01 NOTE — PHYSICAL THERAPY INITIAL EVALUATION ADULT - GAIT TRAINING, PT EVAL
Pt will be able to amb 100' indep in order to safely d/c. Pt. will be able to ascend and descend at least  4-5 steps using 1 HR Ind by D/C

## 2024-04-01 NOTE — PHYSICAL THERAPY INITIAL EVALUATION ADULT - PERTINENT HX OF CURRENT PROBLEM, REHAB EVAL
Pt is a 63 yo F, with PMHx of asthma s/p prior ICU admission in 12/2023 on BiPAP, no prior intubation, HLD, breast CA s/p mastectomy/chemo and radiation, presented to the ED complaining of 1 day history of persistent SOB, wheezing, and chest tightness. She reported that yesterday, she started developing chest and back tightness, nonradiating to the arm or neck, with associated SOB, and wheezing. She tried using her albuterol and nebulizer treatment multiple times with no improvement. Today, she felt a bit better but her symptoms persisted and she felt weak so she came into the ED. No recent sick contacts, travel, fevers, chills, palpitations, HA, abdominal pain, urinary symptoms, numbness, tingling or weakness. Pt reports that all of her symptoms she had was similar to all her prior asthma exacerbations.

## 2024-04-01 NOTE — DISCHARGE NOTE PROVIDER - NSDCMRMEDTOKEN_GEN_ALL_CORE_FT
Advair Diskus 250 mcg-50 mcg inhalation powder: 1 puff(s) inhaled 2 times a day  Albuterol (Eqv-ProAir HFA) 90 mcg/inh inhalation aerosol: 2 puff(s) inhaled every 6 hours as needed for  shortness of breath and/or wheezing  anastrozole 1 mg oral tablet: 1 tab(s) orally once a day  mepolizumab 100 mg/mL subcutaneous solution: subcutaneously once a month  montelukast 10 mg oral tablet: 1 tab(s) orally once a day  Myrbetriq 25 mg oral tablet, extended release: 1 tab(s) orally once a day  omeprazole 20 mg oral delayed release capsule: 1 cap(s) orally once a day  simvastatin 20 mg oral tablet: 1 tab(s) orally once a day (at bedtime)  Spiriva HandiHaler 18 mcg inhalation capsule: 1 cap(s) inhaled once a day  Vitamin D3 50 mcg (2000 intl units) oral tablet: 1 tab(s) orally once a day   Advair Diskus 250 mcg-50 mcg inhalation powder: 1 inhaled 2 times a day  Albuterol (Eqv-ProAir HFA) 90 mcg/inh inhalation aerosol: 2 puff(s) inhaled every 6 hours as needed for  shortness of breath and/or wheezing  anastrozole 1 mg oral tablet: 1 tab(s) orally once a day  Artificial Tears ophthalmic solution: 1 drop(s) in each eye 2 times a day both eyes  mepolizumab 100 mg/mL subcutaneous solution: subcutaneously once a month  montelukast 10 mg oral tablet: 1 tab(s) orally once a day  Myrbetriq 25 mg oral tablet, extended release: 1 tab(s) orally once a day  predniSONE 10 mg oral tablet: 4 tab(s) orally once a day until 4/3/24, followed by 3 tablets once a day until 4/5/24 followed by 2 tablets once a day for 2 days until 4/7/24 followed by 1 tablet once a day for 2 days until 4/9/24  Protonix 40 mg oral delayed release tablet: 1 tab(s) orally once a day  simvastatin 20 mg oral tablet: 1 tab(s) orally once a day (at bedtime)  Spiriva HandiHaler 18 mcg inhalation capsule: 1 cap(s) inhaled once a day  Vitamin D3 50 mcg (2000 intl units) oral tablet: 1 tab(s) orally once a day

## 2024-04-01 NOTE — PHYSICAL THERAPY INITIAL EVALUATION ADULT - ADDITIONAL COMMENTS
Pt reports living in a  with her . Pt has 3 steps to enter into her home. Pt was indep before being admitted to the hospital. Pt has a rolling walker at home which she prefers not to use. 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

## 2024-04-01 NOTE — DISCHARGE NOTE PROVIDER - ATTENDING DISCHARGE PHYSICAL EXAMINATION:
Vital Signs Last 24 Hrs  T(C): 36.5 (01 Apr 2024 12:53), Max: 36.7 (01 Apr 2024 05:00)  T(F): 97.7 (01 Apr 2024 12:53), Max: 98.1 (01 Apr 2024 05:00)  HR: 80 (01 Apr 2024 12:53) (70 - 97)  BP: 111/59 (01 Apr 2024 12:53) (111/59 - 120/58)  BP(mean): --  RR: 18 (01 Apr 2024 12:53) (18 - 18)  SpO2: 94% (01 Apr 2024 12:53) (94% - 98%)    GEN: NAD  Lungs: good A/E no wheezing   CV: NL S1/2  GI: +BS SOft NT ND  Ext: No e/c/c   MS: AOx3                          14.2   18.50 )-----------( 377      ( 01 Apr 2024 06:45 )             41.0   04-01    141  |  102  |  24<H>  ----------------------------<  120<H>  4.5   |  22  |  0.7    Ca    9.6      01 Apr 2024 06:45  Mg     2.3     04-01    TPro  6.9  /  Alb  4.3  /  TBili  <0.2  /  DBili  x   /  AST  14  /  ALT  16  /  AlkPhos  90  04-01

## 2024-04-01 NOTE — DISCHARGE NOTE PROVIDER - NSDCFUSCHEDAPPT_GEN_ALL_CORE_FT
Unknown, Doctor  [facility] PreAdmits  Scheduled Appointment: 04/16/2024    [facility]  CATSCAN  [address]  Scheduled Appointment: 04/16/2024    [facility]  PULMMED [address]  Scheduled Appointment: 05/17/2024

## 2024-04-01 NOTE — DISCHARGE NOTE NURSING/CASE MANAGEMENT/SOCIAL WORK - NSDCPEFALRISK_GEN_ALL_CORE
For information on Fall & Injury Prevention, visit: [email or web address] OR  [email or web address] OR  https://www.cdc.gov/steadi/patient.html

## 2024-04-01 NOTE — PHYSICAL THERAPY INITIAL EVALUATION ADULT - GENERAL OBSERVATIONS, REHAB EVAL
Pt encountered semifowler in bed, NAD, +call bell +alarm. Pt agreeable to pt. Pt left as found semifowler in bed, NAD, +call bell +alarm

## 2024-04-01 NOTE — DISCHARGE NOTE PROVIDER - PROVIDER TOKENS
PROVIDER:[TOKEN:[[id number]],FOLLOWUP:[2 weeks]],PROVIDER:[TOKEN:[[id number]],FOLLOWUP:[2 weeks]] PROVIDER:[TOKEN:[[id number]],FOLLOWUP:[2 weeks]],PROVIDER:[TOKEN:[[id number]],FOLLOWUP:[2 weeks]],PROVIDER:[TOKEN:[[id number]],FOLLOWUP:[1 month]]

## 2024-04-03 ENCOUNTER — APPOINTMENT (OUTPATIENT)
Dept: PULMONOLOGY | Facility: CLINIC | Age: 64
End: 2024-04-03

## 2024-04-08 DIAGNOSIS — Z92.21 PERSONAL HISTORY OF ANTINEOPLASTIC CHEMOTHERAPY: ICD-10-CM

## 2024-04-08 DIAGNOSIS — Z79.51 LONG TERM (CURRENT) USE OF INHALED STEROIDS: ICD-10-CM

## 2024-04-08 DIAGNOSIS — Z79.02 LONG TERM (CURRENT) USE OF ANTITHROMBOTICS/ANTIPLATELETS: ICD-10-CM

## 2024-04-08 DIAGNOSIS — E78.5 HYPERLIPIDEMIA, UNSPECIFIED: ICD-10-CM

## 2024-04-08 DIAGNOSIS — E87.5 HYPERKALEMIA: ICD-10-CM

## 2024-04-08 DIAGNOSIS — Z92.3 PERSONAL HISTORY OF IRRADIATION: ICD-10-CM

## 2024-04-08 DIAGNOSIS — Z91.041 RADIOGRAPHIC DYE ALLERGY STATUS: ICD-10-CM

## 2024-04-08 DIAGNOSIS — Z85.3 PERSONAL HISTORY OF MALIGNANT NEOPLASM OF BREAST: ICD-10-CM

## 2024-04-08 DIAGNOSIS — Z90.10 ACQUIRED ABSENCE OF UNSPECIFIED BREAST AND NIPPLE: ICD-10-CM

## 2024-04-08 DIAGNOSIS — Z79.899 OTHER LONG TERM (CURRENT) DRUG THERAPY: ICD-10-CM

## 2024-04-08 DIAGNOSIS — J45.901 UNSPECIFIED ASTHMA WITH (ACUTE) EXACERBATION: ICD-10-CM

## 2024-05-17 ENCOUNTER — APPOINTMENT (OUTPATIENT)
Dept: PULMONOLOGY | Facility: CLINIC | Age: 64
End: 2024-05-17

## 2024-05-24 RX ORDER — MONTELUKAST 10 MG/1
10 TABLET, FILM COATED ORAL
Qty: 90 | Refills: 3 | Status: ACTIVE | COMMUNITY
Start: 2024-05-24 | End: 1900-01-01

## 2024-05-30 NOTE — ED ADULT NURSE NOTE - TEMPLATE
Nurse to nurse called to Cristal at [place].     Call placed to Cristal to relay that patient would be leaving with all of her belongings including the isolated items. Patient stated that everything including her \"purse, shoes, credit cards and important papers\" were tied up in the bag and she stated that her family member would be picking it up either tonight or tomorrow at the facility. I also relayed that I placed the quadruple tied bag in a red biohazard bag and tied it again. Paula then showed up and transported patient off the unit.    Respiratory

## 2024-05-31 ENCOUNTER — NON-APPOINTMENT (OUTPATIENT)
Age: 64
End: 2024-05-31

## 2024-06-17 ENCOUNTER — APPOINTMENT (OUTPATIENT)
Dept: PULMONOLOGY | Facility: CLINIC | Age: 64
End: 2024-06-17
Payer: COMMERCIAL

## 2024-06-17 VITALS
HEART RATE: 90 BPM | HEIGHT: 66 IN | DIASTOLIC BLOOD PRESSURE: 76 MMHG | BODY MASS INDEX: 22.5 KG/M2 | SYSTOLIC BLOOD PRESSURE: 109 MMHG | WEIGHT: 140 LBS | OXYGEN SATURATION: 95 % | TEMPERATURE: 97.5 F

## 2024-06-17 DIAGNOSIS — Z85.3 PERSONAL HISTORY OF MALIGNANT NEOPLASM OF BREAST: ICD-10-CM

## 2024-06-17 DIAGNOSIS — J44.9 CHRONIC OBSTRUCTIVE PULMONARY DISEASE, UNSPECIFIED: ICD-10-CM

## 2024-06-17 DIAGNOSIS — Z86.2 PERSONAL HISTORY OF DISEASES OF THE BLOOD AND BLOOD-FORMING ORGANS AND CERTAIN DISORDERS INVOLVING THE IMMUNE MECHANISM: ICD-10-CM

## 2024-06-17 DIAGNOSIS — J47.9 BRONCHIECTASIS, UNCOMPLICATED: ICD-10-CM

## 2024-06-17 DIAGNOSIS — Z86.39 PERSONAL HISTORY OF OTHER ENDOCRINE, NUTRITIONAL AND METABOLIC DISEASE: ICD-10-CM

## 2024-06-17 PROCEDURE — 99215 OFFICE O/P EST HI 40 MIN: CPT | Mod: 25

## 2024-06-17 PROCEDURE — 94060 EVALUATION OF WHEEZING: CPT

## 2024-06-17 PROCEDURE — 94729 DIFFUSING CAPACITY: CPT

## 2024-06-17 PROCEDURE — G2211 COMPLEX E/M VISIT ADD ON: CPT | Mod: NC

## 2024-06-17 PROCEDURE — 94726 PLETHYSMOGRAPHY LUNG VOLUMES: CPT

## 2024-06-17 PROCEDURE — ZZZZZ: CPT

## 2024-06-17 RX ORDER — MEPOLIZUMAB 100 MG/ML
100 INJECTION, POWDER, FOR SOLUTION SUBCUTANEOUS
Refills: 0 | Status: ACTIVE | COMMUNITY
Start: 2024-06-17

## 2024-06-17 RX ORDER — MONTELUKAST SODIUM 10 MG/1
10 TABLET, FILM COATED ORAL
Refills: 0 | Status: DISCONTINUED | COMMUNITY
End: 2024-06-17

## 2024-06-17 NOTE — RESULTS/DATA
[TextEntry] : RADIOLOGY 2024 CT chest with CT angiogram [facility] Comparison: 2022 No pulmonary embolism in central pulmonary artery Patent central airways.  Unchanged right upper lobe groundglass opacities.  Unchanged bronchiectasis in all 5 lobes, right greater than left No pleural effusion No adenopathy No suspicious osseous lesion Bilateral breast implants  PFT 2024 FVC: 1.75 L, 56% predicted FEV1: 0.99 L, 41% predicted FEV1/FVC: 57% No change with bronchodilator T.25 L, 83% predicted RV: 2.44 L, 121% predicted RV/T% DLCO: 11.2 units, 52% predicted Impression: Severe obstructive ventilatory deficit without bronchodilator response.  No hyperinflation.  Air-trapping is present.  Moderately reduced diffusing capacity.  No significant change since the prior study on 2023 [facility] FVC: 1.71 L, 57% predicted FEV1: 0.98 L, 42% predicted FEV1/FVC: 57% Postbronchodilator FVC: 1.72 L Postbronchodilator FEV1: 1.09 L, (+11%, +100 cc) Postbronchodilator FEV1/FVC: 63%   2016 FVC: 1.97 L, 54% predicted FEV1: 1.36 L, 48% predicted FEV1/FVC: 69% Postbronchodilator FVC: 2.27 L, (+15%) Postbronchodilator FEV1: 1.55 L (+14%) T% predicted RV: 121% predicted DLCO: 80% predicted  EKG / ECHO      MICRO     PATH    OTHER LABS OF NOTE

## 2024-06-17 NOTE — ASSESSMENT
[FreeTextEntry1] : 63 yo woman, never smoker, with h/o breast cancer NANDO, sarcoidosis (dx in the 1990's via mediastinal LN bx), asthma (dx age 25 during pregnancy), COPD and bronchiectasis   #Sarcoidosis - in remission #Asthma. Severe persistent. Two exacerbations (RSV and PIV triggered respecitvely) in the past year (Dec 2023, May 2024). On ICS, LABA. LAMA, LTRA and Mepolizumab. Currently at baseline with well controlled sx. #Bxsis - "dry", 2/2 asthma and sarcoidosis #COPD - 2/2 asthma, sarcoidosis and bxsis  --continue Advair, Spiriva, Montelukast and Nucala --up to date on vaccinations --recommend RSV vaccine and COVID vaccine in the Fall  FU 6 mo

## 2024-06-17 NOTE — IMMUNIZATIONS
[TextEntry] : Vaccination history: Last COVID: 2021 Last flu: Sept 2023 RSV: none Last pneumococcal / type: Prevnar 13 in Sept 2018 and Pneumovax 23 in October 2021

## 2024-06-17 NOTE — HISTORY OF PRESENT ILLNESS
[Never] : never [TextBox_4] : 65 yo woman, never smoker, with h/o breast cancer NANDO, sarcoidosis (dx in the 1990's via mediastinal LN bx), asthma (dx age 25 during pregnancy), COPD and bronchiectasis  Recent hospitalization for parainfluenza-tirggered asthma exacerbation  No cough No MAYORGA with walking slowly for 15 minutes A little sob with 15 stairs in the house no wheezing or chest tightness no need for rescue inhaler (uses outside) albuterol neb daily before going to work continues on advair, spitiva, montelukast, nucala

## 2024-07-29 RX ORDER — FLUTICASONE PROPIONATE AND SALMETEROL 250; 50 UG/1; UG/1
250-50 POWDER RESPIRATORY (INHALATION)
Qty: 1 | Refills: 0 | Status: ACTIVE | COMMUNITY
Start: 2024-07-29 | End: 1900-01-01

## 2024-07-29 RX ORDER — FLUTICASONE PROPIONATE AND SALMETEROL 250; 50 UG/1; UG/1
250-50 POWDER RESPIRATORY (INHALATION)
Qty: 3 | Refills: 3 | Status: ACTIVE | COMMUNITY
Start: 2024-07-29 | End: 1900-01-01

## 2024-08-30 RX ORDER — PREDNISONE 20 MG/1
20 TABLET ORAL DAILY
Qty: 10 | Refills: 0 | Status: ACTIVE | COMMUNITY
Start: 2024-08-30 | End: 1900-01-01

## 2024-11-04 RX ORDER — LEVALBUTEROL HYDROCHLORIDE 0.63 MG/3ML
0.63 SOLUTION RESPIRATORY (INHALATION)
Qty: 3 | Refills: 5 | Status: ACTIVE | COMMUNITY
Start: 2024-11-04 | End: 1900-01-01

## 2024-12-27 ENCOUNTER — APPOINTMENT (OUTPATIENT)
Dept: PULMONOLOGY | Facility: CLINIC | Age: 64
End: 2024-12-27
Payer: COMMERCIAL

## 2024-12-27 ENCOUNTER — TRANSCRIPTION ENCOUNTER (OUTPATIENT)
Age: 64
End: 2024-12-27

## 2024-12-27 ENCOUNTER — NON-APPOINTMENT (OUTPATIENT)
Age: 64
End: 2024-12-27

## 2024-12-27 VITALS
HEIGHT: 66 IN | OXYGEN SATURATION: 97 % | HEART RATE: 94 BPM | WEIGHT: 143 LBS | TEMPERATURE: 98.6 F | SYSTOLIC BLOOD PRESSURE: 110 MMHG | BODY MASS INDEX: 22.98 KG/M2 | DIASTOLIC BLOOD PRESSURE: 70 MMHG

## 2024-12-27 DIAGNOSIS — B34.9 VIRAL INFECTION, UNSPECIFIED: ICD-10-CM

## 2024-12-27 DIAGNOSIS — R05.9 COUGH, UNSPECIFIED: ICD-10-CM

## 2024-12-27 PROCEDURE — 99214 OFFICE O/P EST MOD 30 MIN: CPT

## 2024-12-27 PROCEDURE — G2211 COMPLEX E/M VISIT ADD ON: CPT | Mod: NC

## 2024-12-27 RX ORDER — AZITHROMYCIN 250 MG/1
250 TABLET, FILM COATED ORAL
Qty: 1 | Refills: 0 | Status: ACTIVE | COMMUNITY
Start: 2024-12-27 | End: 1900-01-01

## 2024-12-27 RX ORDER — PREDNISONE 20 MG/1
20 TABLET ORAL
Qty: 10 | Refills: 0 | Status: ACTIVE | COMMUNITY
Start: 2024-12-27 | End: 1900-01-01

## 2024-12-27 RX ORDER — FLUTICASONE PROPIONATE AND SALMETEROL 250; 50 UG/1; UG/1
250-50 POWDER RESPIRATORY (INHALATION) TWICE DAILY
Qty: 1 | Refills: 11 | Status: ACTIVE | COMMUNITY
Start: 2024-12-27 | End: 1900-01-01

## 2024-12-30 LAB
RAPID RVP RESULT: NOT DETECTED
SARS-COV-2 RNA RESP QL NAA+PROBE: NOT DETECTED

## 2024-12-31 LAB — BACTERIA SPT CULT: NORMAL

## 2025-02-12 RX ORDER — NEBULIZER ACCESSORIES
KIT MISCELLANEOUS
Qty: 1 | Refills: 0 | Status: ACTIVE | COMMUNITY
Start: 2025-02-12 | End: 1900-01-01

## 2025-02-14 ENCOUNTER — INPATIENT (INPATIENT)
Facility: HOSPITAL | Age: 65
LOS: 2 days | Discharge: HOME | DRG: 202 | End: 2025-02-17
Attending: INTERNAL MEDICINE | Admitting: STUDENT IN AN ORGANIZED HEALTH CARE EDUCATION/TRAINING PROGRAM
Payer: COMMERCIAL

## 2025-02-14 VITALS
DIASTOLIC BLOOD PRESSURE: 77 MMHG | SYSTOLIC BLOOD PRESSURE: 124 MMHG | TEMPERATURE: 98 F | WEIGHT: 149.91 LBS | OXYGEN SATURATION: 92 % | RESPIRATION RATE: 22 BRPM | HEART RATE: 79 BPM

## 2025-02-14 LAB
ALBUMIN SERPL ELPH-MCNC: 4.3 G/DL — SIGNIFICANT CHANGE UP (ref 3.5–5.2)
ALP SERPL-CCNC: 75 U/L — SIGNIFICANT CHANGE UP (ref 30–115)
ALT FLD-CCNC: 26 U/L — SIGNIFICANT CHANGE UP (ref 0–41)
ANION GAP SERPL CALC-SCNC: 14 MMOL/L — SIGNIFICANT CHANGE UP (ref 7–14)
AST SERPL-CCNC: 23 U/L — SIGNIFICANT CHANGE UP (ref 0–41)
BASOPHILS # BLD AUTO: 0.03 K/UL — SIGNIFICANT CHANGE UP (ref 0–0.2)
BASOPHILS NFR BLD AUTO: 0.3 % — SIGNIFICANT CHANGE UP (ref 0–1)
BILIRUB SERPL-MCNC: 0.2 MG/DL — SIGNIFICANT CHANGE UP (ref 0.2–1.2)
BUN SERPL-MCNC: 31 MG/DL — HIGH (ref 10–20)
CALCIUM SERPL-MCNC: 9.3 MG/DL — SIGNIFICANT CHANGE UP (ref 8.4–10.5)
CHLORIDE SERPL-SCNC: 102 MMOL/L — SIGNIFICANT CHANGE UP (ref 98–110)
CO2 SERPL-SCNC: 22 MMOL/L — SIGNIFICANT CHANGE UP (ref 17–32)
CREAT SERPL-MCNC: 0.6 MG/DL — LOW (ref 0.7–1.5)
EGFR: 99 ML/MIN/1.73M2 — SIGNIFICANT CHANGE UP
EOSINOPHIL # BLD AUTO: 0.01 K/UL — SIGNIFICANT CHANGE UP (ref 0–0.7)
EOSINOPHIL NFR BLD AUTO: 0.1 % — SIGNIFICANT CHANGE UP (ref 0–8)
FLUAV AG NPH QL: SIGNIFICANT CHANGE UP
FLUBV AG NPH QL: SIGNIFICANT CHANGE UP
GAS PNL BLDV: SIGNIFICANT CHANGE UP
GLUCOSE SERPL-MCNC: 155 MG/DL — HIGH (ref 70–99)
HCT VFR BLD CALC: 42.5 % — SIGNIFICANT CHANGE UP (ref 37–47)
HGB BLD-MCNC: 14.1 G/DL — SIGNIFICANT CHANGE UP (ref 12–16)
IMM GRANULOCYTES NFR BLD AUTO: 0.2 % — SIGNIFICANT CHANGE UP (ref 0.1–0.3)
LYMPHOCYTES # BLD AUTO: 0.89 K/UL — LOW (ref 1.2–3.4)
LYMPHOCYTES # BLD AUTO: 8.7 % — LOW (ref 20.5–51.1)
MCHC RBC-ENTMCNC: 30 PG — SIGNIFICANT CHANGE UP (ref 27–31)
MCHC RBC-ENTMCNC: 33.2 G/DL — SIGNIFICANT CHANGE UP (ref 32–37)
MCV RBC AUTO: 90.4 FL — SIGNIFICANT CHANGE UP (ref 81–99)
MONOCYTES # BLD AUTO: 0.12 K/UL — SIGNIFICANT CHANGE UP (ref 0.1–0.6)
MONOCYTES NFR BLD AUTO: 1.2 % — LOW (ref 1.7–9.3)
NEUTROPHILS # BLD AUTO: 9.17 K/UL — HIGH (ref 1.4–6.5)
NEUTROPHILS NFR BLD AUTO: 89.5 % — HIGH (ref 42.2–75.2)
NRBC BLD AUTO-RTO: 0 /100 WBCS — SIGNIFICANT CHANGE UP (ref 0–0)
PLATELET # BLD AUTO: 306 K/UL — SIGNIFICANT CHANGE UP (ref 130–400)
PMV BLD: 9.2 FL — SIGNIFICANT CHANGE UP (ref 7.4–10.4)
POTASSIUM SERPL-MCNC: 4.1 MMOL/L — SIGNIFICANT CHANGE UP (ref 3.5–5)
POTASSIUM SERPL-SCNC: 4.1 MMOL/L — SIGNIFICANT CHANGE UP (ref 3.5–5)
PROT SERPL-MCNC: 6.6 G/DL — SIGNIFICANT CHANGE UP (ref 6–8)
RBC # BLD: 4.7 M/UL — SIGNIFICANT CHANGE UP (ref 4.2–5.4)
RBC # FLD: 14.9 % — HIGH (ref 11.5–14.5)
RSV RNA NPH QL NAA+NON-PROBE: SIGNIFICANT CHANGE UP
SARS-COV-2 RNA SPEC QL NAA+PROBE: SIGNIFICANT CHANGE UP
SODIUM SERPL-SCNC: 138 MMOL/L — SIGNIFICANT CHANGE UP (ref 135–146)
WBC # BLD: 10.24 K/UL — SIGNIFICANT CHANGE UP (ref 4.8–10.8)
WBC # FLD AUTO: 10.24 K/UL — SIGNIFICANT CHANGE UP (ref 4.8–10.8)

## 2025-02-14 PROCEDURE — 71045 X-RAY EXAM CHEST 1 VIEW: CPT | Mod: 26

## 2025-02-14 PROCEDURE — 99285 EMERGENCY DEPT VISIT HI MDM: CPT

## 2025-02-14 PROCEDURE — 93010 ELECTROCARDIOGRAM REPORT: CPT

## 2025-02-14 RX ORDER — IPRATROPIUM BROMIDE AND ALBUTEROL SULFATE .5; 2.5 MG/3ML; MG/3ML
3 SOLUTION RESPIRATORY (INHALATION)
Refills: 0 | Status: DISCONTINUED | OUTPATIENT
Start: 2025-02-14 | End: 2025-02-15

## 2025-02-14 RX ORDER — MAGNESIUM SULFATE 500 MG/ML
2 SYRINGE (ML) INJECTION ONCE
Refills: 0 | Status: COMPLETED | OUTPATIENT
Start: 2025-02-14 | End: 2025-02-14

## 2025-02-14 RX ORDER — AMOXICILLIN AND CLAVULANATE POTASSIUM 500; 125 MG/1; MG/1
1 TABLET, FILM COATED ORAL ONCE
Refills: 0 | Status: COMPLETED | OUTPATIENT
Start: 2025-02-14 | End: 2025-02-14

## 2025-02-14 RX ADMIN — Medication 150 GRAM(S): at 21:09

## 2025-02-14 RX ADMIN — IPRATROPIUM BROMIDE AND ALBUTEROL SULFATE 3 MILLILITER(S): .5; 2.5 SOLUTION RESPIRATORY (INHALATION) at 21:09

## 2025-02-14 RX ADMIN — AMOXICILLIN AND CLAVULANATE POTASSIUM 1 TABLET(S): 500; 125 TABLET, FILM COATED ORAL at 22:44

## 2025-02-14 NOTE — ED PROVIDER NOTE - PHYSICAL EXAMINATION
CONST: in NAD  EYES: EOMI, Sclera and conjunctiva clear.   ENT: No nasal discharge. Oropharynx normal appearing, no erythema or exudates. Uvula midline.  CARD: Normal S1 S2; Normal rate and rhythm  RESP: Equal BS B/L, No rhonchi or rales. No distress. poor air movement, b/l wheeze  GI: Soft, non-tender, non-distended.  MS: Normal ROM in all extremities.   SKIN: Warm, dry, no acute rashes. Good turgor  NEURO: A&Ox3, No focal deficits. Strength 5/5

## 2025-02-14 NOTE — ED ADULT NURSE NOTE - OBJECTIVE STATEMENT
presented to ed for c/o productive cough, sob, and wheezing x3 days without relief from medications.

## 2025-02-14 NOTE — ED ADULT TRIAGE NOTE - CHIEF COMPLAINT QUOTE
Pt came c/o productive cough, shortness of breath and wheezing x 3 days, was using albuterol and took Prednisone 40 mg x 2 days without relief. SPO2 in triage 92%.

## 2025-02-14 NOTE — ED PROVIDER NOTE - CLINICAL SUMMARY MEDICAL DECISION MAKING FREE TEXT BOX
The patient is Stable - Low risk of patient condition declining or worsening    Shift Goals  Clinical Goals: safety  Patient Goals: rest, go home  Family Goals: Discharge home    Progress made toward(s) clinical / shift goals:    Problem: Skin Integrity  Goal: Skin integrity is maintained or improved  Outcome: Progressing  Note: Patients skin is red and blanchable.  Patient is on q2 turns and tolerates well.       Problem: Fall Risk  Goal: Patient will remain free from falls  Outcome: Progressing  Note: Patient remains safe in bed and free from falls with call light and personal items within reach.  Patient uses call light appropriately for assistance.               pt with asthma exacerbation, previous ICU admissions for similar reasons.     Independently interpretted by Vin Mejia DO:  XR interpretation: No cardiopulmonary disease,   EKG interpretation: no DELIA, NSR    Appropriate medications for patient's presenting complaints were ordered and effects were reassessed. Patient's external records were reviewed    Escalation to admission and/or observation was considered.  Patient feels much better and is comfortable with discharge.  Appropriate follow-up was arranged.    albuterol, no concern for sepsis.

## 2025-02-14 NOTE — ED PROVIDER NOTE - OBJECTIVE STATEMENT
patient is a 64yo female PMH asthma (never intubated), Hyperlipidemia, breast cancer (s/p mastectomy/chemo/radiation) coming to ED for cough and shortness of breath.  Patient reports x 3 days has had increasing audible wheeze and difficulty breathing, worse when walking or speaking, has had a associated productive cough with green phlegm x 2 days.  Has been using nebulizer and rescue inhaler with no relief, has taken prednisone 40 mg yesterday and today with no relief.  Denies fever, chest pain, abdominal pain, nausea vomiting diarrhea constipation, urinary symptoms, recent travel

## 2025-02-15 DIAGNOSIS — J45.901 UNSPECIFIED ASTHMA WITH (ACUTE) EXACERBATION: ICD-10-CM

## 2025-02-15 LAB
ALBUMIN SERPL ELPH-MCNC: 4.3 G/DL — SIGNIFICANT CHANGE UP (ref 3.5–5.2)
ALP SERPL-CCNC: 74 U/L — SIGNIFICANT CHANGE UP (ref 30–115)
ALT FLD-CCNC: 24 U/L — SIGNIFICANT CHANGE UP (ref 0–41)
ANION GAP SERPL CALC-SCNC: 12 MMOL/L — SIGNIFICANT CHANGE UP (ref 7–14)
AST SERPL-CCNC: 19 U/L — SIGNIFICANT CHANGE UP (ref 0–41)
BASOPHILS # BLD AUTO: 0.03 K/UL — SIGNIFICANT CHANGE UP (ref 0–0.2)
BASOPHILS NFR BLD AUTO: 0.5 % — SIGNIFICANT CHANGE UP (ref 0–1)
BILIRUB SERPL-MCNC: 0.3 MG/DL — SIGNIFICANT CHANGE UP (ref 0.2–1.2)
BUN SERPL-MCNC: 20 MG/DL — SIGNIFICANT CHANGE UP (ref 10–20)
CALCIUM SERPL-MCNC: 9.6 MG/DL — SIGNIFICANT CHANGE UP (ref 8.4–10.5)
CHLORIDE SERPL-SCNC: 104 MMOL/L — SIGNIFICANT CHANGE UP (ref 98–110)
CO2 SERPL-SCNC: 24 MMOL/L — SIGNIFICANT CHANGE UP (ref 17–32)
CREAT SERPL-MCNC: 0.6 MG/DL — LOW (ref 0.7–1.5)
EGFR: 99 ML/MIN/1.73M2 — SIGNIFICANT CHANGE UP
EOSINOPHIL # BLD AUTO: 0 K/UL — SIGNIFICANT CHANGE UP (ref 0–0.7)
EOSINOPHIL NFR BLD AUTO: 0 % — SIGNIFICANT CHANGE UP (ref 0–8)
GLUCOSE SERPL-MCNC: 108 MG/DL — HIGH (ref 70–99)
HCT VFR BLD CALC: 41.8 % — SIGNIFICANT CHANGE UP (ref 37–47)
HGB BLD-MCNC: 14.2 G/DL — SIGNIFICANT CHANGE UP (ref 12–16)
IMM GRANULOCYTES NFR BLD AUTO: 0.2 % — SIGNIFICANT CHANGE UP (ref 0.1–0.3)
LACTATE SERPL-SCNC: 3.2 MMOL/L — HIGH (ref 0.7–2)
LYMPHOCYTES # BLD AUTO: 0.97 K/UL — LOW (ref 1.2–3.4)
LYMPHOCYTES # BLD AUTO: 14.9 % — LOW (ref 20.5–51.1)
MAGNESIUM SERPL-MCNC: 2.3 MG/DL — SIGNIFICANT CHANGE UP (ref 1.8–2.4)
MCHC RBC-ENTMCNC: 30.3 PG — SIGNIFICANT CHANGE UP (ref 27–31)
MCHC RBC-ENTMCNC: 34 G/DL — SIGNIFICANT CHANGE UP (ref 32–37)
MCV RBC AUTO: 89.3 FL — SIGNIFICANT CHANGE UP (ref 81–99)
MONOCYTES # BLD AUTO: 0.23 K/UL — SIGNIFICANT CHANGE UP (ref 0.1–0.6)
MONOCYTES NFR BLD AUTO: 3.5 % — SIGNIFICANT CHANGE UP (ref 1.7–9.3)
NEUTROPHILS # BLD AUTO: 5.28 K/UL — SIGNIFICANT CHANGE UP (ref 1.4–6.5)
NEUTROPHILS NFR BLD AUTO: 80.9 % — HIGH (ref 42.2–75.2)
NRBC BLD AUTO-RTO: 0 /100 WBCS — SIGNIFICANT CHANGE UP (ref 0–0)
PLATELET # BLD AUTO: 336 K/UL — SIGNIFICANT CHANGE UP (ref 130–400)
PMV BLD: 9.7 FL — SIGNIFICANT CHANGE UP (ref 7.4–10.4)
POTASSIUM SERPL-MCNC: 4.4 MMOL/L — SIGNIFICANT CHANGE UP (ref 3.5–5)
POTASSIUM SERPL-SCNC: 4.4 MMOL/L — SIGNIFICANT CHANGE UP (ref 3.5–5)
PROT SERPL-MCNC: 6.9 G/DL — SIGNIFICANT CHANGE UP (ref 6–8)
RBC # BLD: 4.68 M/UL — SIGNIFICANT CHANGE UP (ref 4.2–5.4)
RBC # FLD: 15.1 % — HIGH (ref 11.5–14.5)
SODIUM SERPL-SCNC: 140 MMOL/L — SIGNIFICANT CHANGE UP (ref 135–146)
WBC # BLD: 6.52 K/UL — SIGNIFICANT CHANGE UP (ref 4.8–10.8)
WBC # FLD AUTO: 6.52 K/UL — SIGNIFICANT CHANGE UP (ref 4.8–10.8)

## 2025-02-15 PROCEDURE — 94640 AIRWAY INHALATION TREATMENT: CPT

## 2025-02-15 PROCEDURE — 85025 COMPLETE CBC W/AUTO DIFF WBC: CPT

## 2025-02-15 PROCEDURE — 80053 COMPREHEN METABOLIC PANEL: CPT

## 2025-02-15 PROCEDURE — 93005 ELECTROCARDIOGRAM TRACING: CPT

## 2025-02-15 PROCEDURE — 71045 X-RAY EXAM CHEST 1 VIEW: CPT

## 2025-02-15 PROCEDURE — 36415 COLL VENOUS BLD VENIPUNCTURE: CPT

## 2025-02-15 PROCEDURE — 97162 PT EVAL MOD COMPLEX 30 MIN: CPT | Mod: GP

## 2025-02-15 PROCEDURE — 83735 ASSAY OF MAGNESIUM: CPT

## 2025-02-15 PROCEDURE — 84145 PROCALCITONIN (PCT): CPT

## 2025-02-15 PROCEDURE — 71045 X-RAY EXAM CHEST 1 VIEW: CPT | Mod: 26

## 2025-02-15 PROCEDURE — 84100 ASSAY OF PHOSPHORUS: CPT

## 2025-02-15 PROCEDURE — G0378: CPT

## 2025-02-15 PROCEDURE — 99232 SBSQ HOSP IP/OBS MODERATE 35: CPT

## 2025-02-15 PROCEDURE — 83605 ASSAY OF LACTIC ACID: CPT

## 2025-02-15 RX ORDER — IPRATROPIUM BROMIDE AND ALBUTEROL SULFATE .5; 2.5 MG/3ML; MG/3ML
3 SOLUTION RESPIRATORY (INHALATION) EVERY 6 HOURS
Refills: 0 | Status: DISCONTINUED | OUTPATIENT
Start: 2025-02-15 | End: 2025-02-17

## 2025-02-15 RX ORDER — DOXYCYCLINE HYCLATE 100 MG
TABLET ORAL
Refills: 0 | Status: DISCONTINUED | OUTPATIENT
Start: 2025-02-15 | End: 2025-02-17

## 2025-02-15 RX ORDER — TIOTROPIUM BROMIDE INHALATION SPRAY 3.12 UG/1
2 SPRAY, METERED RESPIRATORY (INHALATION) DAILY
Refills: 0 | Status: DISCONTINUED | OUTPATIENT
Start: 2025-02-15 | End: 2025-02-15

## 2025-02-15 RX ORDER — DOXYCYCLINE HYCLATE 100 MG
100 TABLET ORAL EVERY 12 HOURS
Refills: 0 | Status: DISCONTINUED | OUTPATIENT
Start: 2025-02-15 | End: 2025-02-17

## 2025-02-15 RX ORDER — INFLUENZA A VIRUS A/IDAHO/07/2018 (H1N1) ANTIGEN (MDCK CELL DERIVED, PROPIOLACTONE INACTIVATED, INFLUENZA A VIRUS A/INDIANA/08/2018 (H3N2) ANTIGEN (MDCK CELL DERIVED, PROPIOLACTONE INACTIVATED), INFLUENZA B VIRUS B/SINGAPORE/INFTT-16-0610/2016 ANTIGEN (MDCK CELL DERIVED, PROPIOLACTONE INACTIVATED), INFLUENZA B VIRUS B/IOWA/06/2017 ANTIGEN (MDCK CELL DERIVED, PROPIOLACTONE INACTIVATED) 15; 15; 15; 15 UG/.5ML; UG/.5ML; UG/.5ML; UG/.5ML
0.5 INJECTION, SUSPENSION INTRAMUSCULAR ONCE
Refills: 0 | Status: DISCONTINUED | OUTPATIENT
Start: 2025-02-15 | End: 2025-02-17

## 2025-02-15 RX ORDER — ENOXAPARIN SODIUM 100 MG/ML
40 INJECTION SUBCUTANEOUS EVERY 24 HOURS
Refills: 0 | Status: DISCONTINUED | OUTPATIENT
Start: 2025-02-15 | End: 2025-02-17

## 2025-02-15 RX ORDER — HYPROMELLOSE 0.4 %
1 DROPS OPHTHALMIC (EYE)
Refills: 0 | Status: DISCONTINUED | OUTPATIENT
Start: 2025-02-15 | End: 2025-02-17

## 2025-02-15 RX ORDER — ALBUTEROL SULFATE 2.5 MG/3ML
3 VIAL, NEBULIZER (ML) INHALATION EVERY 6 HOURS
Refills: 0 | Status: DISCONTINUED | OUTPATIENT
Start: 2025-02-15 | End: 2025-02-17

## 2025-02-15 RX ORDER — DOXYCYCLINE HYCLATE 100 MG
100 TABLET ORAL ONCE
Refills: 0 | Status: COMPLETED | OUTPATIENT
Start: 2025-02-15 | End: 2025-02-15

## 2025-02-15 RX ORDER — MONTELUKAST SODIUM 10 MG/1
10 TABLET ORAL DAILY
Refills: 0 | Status: DISCONTINUED | OUTPATIENT
Start: 2025-02-15 | End: 2025-02-17

## 2025-02-15 RX ORDER — ATORVASTATIN CALCIUM 80 MG/1
20 TABLET, FILM COATED ORAL AT BEDTIME
Refills: 0 | Status: DISCONTINUED | OUTPATIENT
Start: 2025-02-15 | End: 2025-02-17

## 2025-02-15 RX ORDER — METHYLPREDNISOLONE ACETATE 80 MG/ML
60 INJECTION, SUSPENSION INTRA-ARTICULAR; INTRALESIONAL; INTRAMUSCULAR; SOFT TISSUE
Refills: 0 | Status: DISCONTINUED | OUTPATIENT
Start: 2025-02-15 | End: 2025-02-16

## 2025-02-15 RX ORDER — MAGNESIUM SULFATE 500 MG/ML
2 SYRINGE (ML) INJECTION ONCE
Refills: 0 | Status: COMPLETED | OUTPATIENT
Start: 2025-02-15 | End: 2025-02-15

## 2025-02-15 RX ORDER — PREDNISONE 20 MG/1
60 TABLET ORAL DAILY
Refills: 0 | Status: DISCONTINUED | OUTPATIENT
Start: 2025-02-15 | End: 2025-02-15

## 2025-02-15 RX ORDER — ANASTROZOLE 1 MG/1
1 TABLET ORAL DAILY
Refills: 0 | Status: DISCONTINUED | OUTPATIENT
Start: 2025-02-15 | End: 2025-02-17

## 2025-02-15 RX ADMIN — PREDNISONE 60 MILLIGRAM(S): 20 TABLET ORAL at 05:50

## 2025-02-15 RX ADMIN — METHYLPREDNISOLONE ACETATE 60 MILLIGRAM(S): 80 INJECTION, SUSPENSION INTRA-ARTICULAR; INTRALESIONAL; INTRAMUSCULAR; SOFT TISSUE at 18:18

## 2025-02-15 RX ADMIN — MONTELUKAST SODIUM 10 MILLIGRAM(S): 10 TABLET ORAL at 11:06

## 2025-02-15 RX ADMIN — IPRATROPIUM BROMIDE AND ALBUTEROL SULFATE 3 MILLILITER(S): .5; 2.5 SOLUTION RESPIRATORY (INHALATION) at 09:03

## 2025-02-15 RX ADMIN — Medication 100 MILLIGRAM(S): at 10:07

## 2025-02-15 RX ADMIN — Medication 40 MILLIGRAM(S): at 05:50

## 2025-02-15 RX ADMIN — Medication 1 DOSE(S): at 20:58

## 2025-02-15 RX ADMIN — ANASTROZOLE 1 MILLIGRAM(S): 1 TABLET ORAL at 11:06

## 2025-02-15 RX ADMIN — Medication 1 DROP(S): at 13:48

## 2025-02-15 RX ADMIN — Medication 1 DOSE(S): at 10:09

## 2025-02-15 RX ADMIN — IPRATROPIUM BROMIDE AND ALBUTEROL SULFATE 3 MILLILITER(S): .5; 2.5 SOLUTION RESPIRATORY (INHALATION) at 20:03

## 2025-02-15 RX ADMIN — ATORVASTATIN CALCIUM 20 MILLIGRAM(S): 80 TABLET, FILM COATED ORAL at 21:27

## 2025-02-15 RX ADMIN — ENOXAPARIN SODIUM 40 MILLIGRAM(S): 100 INJECTION SUBCUTANEOUS at 05:50

## 2025-02-15 RX ADMIN — Medication 150 GRAM(S): at 11:06

## 2025-02-15 RX ADMIN — Medication 1 DROP(S): at 18:23

## 2025-02-15 RX ADMIN — IPRATROPIUM BROMIDE AND ALBUTEROL SULFATE 3 MILLILITER(S): .5; 2.5 SOLUTION RESPIRATORY (INHALATION) at 14:00

## 2025-02-15 RX ADMIN — Medication 100 MILLIGRAM(S): at 18:20

## 2025-02-15 NOTE — PATIENT PROFILE ADULT - CAREGIVER ADDRESS
[FreeTextEntry1] : f/u [de-identified] : 41y f w/ pMHx anxiety, HTN, HLD, preDM, obesity, presenting for f/u. \par \par HTN: BP elevated last visit and so amlodipine was increased to 10mg. reports compliance. takes her medication daily in the mornings. states has been checking her BP at home which has been on average in the 130s/90s. \par \par anxiety: on lexapro 10mg daily, prescribed by her psychiatrist.\par \par preDM; a1c 6.1 from labs 5/31. she has been trying to eat less since our last visit. \par \par HLD; lipids checked in june, very elevated. pt started on atorvastatin 40mg last month. takes it every night with  no issues. \par \par BMI 45- has not met with bariatrics yet. she has been intermittent fasting for the last week. avoids eating from 8pm- 8am.  [address] [facility] [address]

## 2025-02-15 NOTE — H&P ADULT - HISTORY OF PRESENT ILLNESS
65-year-old female with a past medical history of asthma (never intubated), hyperlipidemia, and breast cancer (status-post mastectomy, chemotherapy, and radiation) presented to the ED with a three-day history of worsening cough and shortness of breath accompanied by dyspnea on exertion i.e walking or speaking . A productive cough with green sputum developed two days prior to presentation. She reported using her nebulizer and rescue inhaler without relief and had taken prednisone 40mg for the past two days, also without improvement. She reported an increasing audible wheeze   She denied fever, chest pain, abdominal pain, nausea, vomiting, diarrhea, constipation, urinary symptoms, or recent travel.    In Ed on vitals :   BP :124 /77 mm Hg ,Heart Rate:79 /min  RR: 22 /min, afebrile on NC     On Labs in Ed :   lactate of 2.3 on VBG , RVP negative     s/p Augmentin  875 mg stat , Duonebs and Magnesium 2 gm stat     admitted for further workup .  65-year-old female with a past medical history of asthma (never intubated), hyperlipidemia, and breast cancer (status-post mastectomy, chemotherapy, and radiation) presented to the ED with a three-day history of worsening cough and shortness of breath accompanied by dyspnea on exertion i.e walking or speaking . A productive cough with green sputum developed two days prior to presentation. She reported using her nebulizer and rescue inhaler without relief and had taken prednisone 40mg for the past two days, also without improvement. She reported an increasing audible wheeze   She denied fever, chest pain, abdominal pain, nausea, vomiting, diarrhea, constipation, urinary symptoms, or recent travel.    On my exam patient complains of feeling choked , were on 2L of NC with b/l wheezing and coughing ,     In Ed on vitals :   BP :124 /77 mm Hg ,Heart Rate:79 /min  RR: 22 /min, afebrile on NC 2L    On Labs in Ed :   lactate of 2.3 on VBG , RVP negative     s/p Augmentin  875 mg stat , Duonebs and Magnesium 2 gm stat     admitted for further workup .

## 2025-02-15 NOTE — H&P ADULT - NSHPPHYSICALEXAM_GEN_ALL_CORE
CONST: lying comfortably   EYES: EOMI, Sclera and conjunctiva clear.   ENT: No nasal discharge. Oropharynx normal appearing, no erythema or exudates. Uvula midline.  CARD: Normal S1 S2; Normal rate and rhythm  RESP: moderate wheezing bilaterally   GI: Soft, non-tender, non-distended.  MS: Normal ROM in all extremities.   SKIN: Warm, dry, no acute rashes. Good turgor  NEURO: A&Ox3, No focal deficits. CONST: lying comfortably , on 2L NC  EYES: EOMI, Sclera and conjunctiva clear.   ENT: No nasal discharge. Oropharynx normal appearing, no erythema or exudates. Uvula midline.  CARD: Normal S1 S2; Normal rate and rhythm  RESP: moderate wheezing bilaterally   GI: Soft, non-tender, non-distended.  MS: Normal ROM in all extremities.   SKIN: Warm, dry, no acute rashes. Good turgor  NEURO: A&Ox3, No focal deficits.

## 2025-02-15 NOTE — PATIENT PROFILE ADULT - FUNCTIONAL ASSESSMENT - DAILY ACTIVITY 5.
· 4 = No assist / stand by assistance

## 2025-02-16 LAB
ALBUMIN SERPL ELPH-MCNC: 4.5 G/DL — SIGNIFICANT CHANGE UP (ref 3.5–5.2)
ALP SERPL-CCNC: 75 U/L — SIGNIFICANT CHANGE UP (ref 30–115)
ALT FLD-CCNC: 22 U/L — SIGNIFICANT CHANGE UP (ref 0–41)
ANION GAP SERPL CALC-SCNC: 14 MMOL/L — SIGNIFICANT CHANGE UP (ref 7–14)
AST SERPL-CCNC: 15 U/L — SIGNIFICANT CHANGE UP (ref 0–41)
BASOPHILS # BLD AUTO: 0.01 K/UL — SIGNIFICANT CHANGE UP (ref 0–0.2)
BASOPHILS NFR BLD AUTO: 0.1 % — SIGNIFICANT CHANGE UP (ref 0–1)
BILIRUB SERPL-MCNC: 0.2 MG/DL — SIGNIFICANT CHANGE UP (ref 0.2–1.2)
BUN SERPL-MCNC: 25 MG/DL — HIGH (ref 10–20)
CALCIUM SERPL-MCNC: 9.6 MG/DL — SIGNIFICANT CHANGE UP (ref 8.4–10.5)
CHLORIDE SERPL-SCNC: 102 MMOL/L — SIGNIFICANT CHANGE UP (ref 98–110)
CO2 SERPL-SCNC: 24 MMOL/L — SIGNIFICANT CHANGE UP (ref 17–32)
CREAT SERPL-MCNC: 0.7 MG/DL — SIGNIFICANT CHANGE UP (ref 0.7–1.5)
EGFR: 95 ML/MIN/1.73M2 — SIGNIFICANT CHANGE UP
EOSINOPHIL # BLD AUTO: 0 K/UL — SIGNIFICANT CHANGE UP (ref 0–0.7)
EOSINOPHIL NFR BLD AUTO: 0 % — SIGNIFICANT CHANGE UP (ref 0–8)
GLUCOSE SERPL-MCNC: 146 MG/DL — HIGH (ref 70–99)
HCT VFR BLD CALC: 43.9 % — SIGNIFICANT CHANGE UP (ref 37–47)
HGB BLD-MCNC: 14.5 G/DL — SIGNIFICANT CHANGE UP (ref 12–16)
IMM GRANULOCYTES NFR BLD AUTO: 0.3 % — SIGNIFICANT CHANGE UP (ref 0.1–0.3)
LYMPHOCYTES # BLD AUTO: 1.04 K/UL — LOW (ref 1.2–3.4)
LYMPHOCYTES # BLD AUTO: 9.9 % — LOW (ref 20.5–51.1)
MAGNESIUM SERPL-MCNC: 2.2 MG/DL — SIGNIFICANT CHANGE UP (ref 1.8–2.4)
MCHC RBC-ENTMCNC: 29.8 PG — SIGNIFICANT CHANGE UP (ref 27–31)
MCHC RBC-ENTMCNC: 33 G/DL — SIGNIFICANT CHANGE UP (ref 32–37)
MCV RBC AUTO: 90.1 FL — SIGNIFICANT CHANGE UP (ref 81–99)
MONOCYTES # BLD AUTO: 0.14 K/UL — SIGNIFICANT CHANGE UP (ref 0.1–0.6)
MONOCYTES NFR BLD AUTO: 1.3 % — LOW (ref 1.7–9.3)
NEUTROPHILS # BLD AUTO: 9.31 K/UL — HIGH (ref 1.4–6.5)
NEUTROPHILS NFR BLD AUTO: 88.4 % — HIGH (ref 42.2–75.2)
NRBC BLD AUTO-RTO: 0 /100 WBCS — SIGNIFICANT CHANGE UP (ref 0–0)
PHOSPHATE SERPL-MCNC: 3.3 MG/DL — SIGNIFICANT CHANGE UP (ref 2.1–4.9)
PLATELET # BLD AUTO: 373 K/UL — SIGNIFICANT CHANGE UP (ref 130–400)
PMV BLD: 9.8 FL — SIGNIFICANT CHANGE UP (ref 7.4–10.4)
POTASSIUM SERPL-MCNC: 4.5 MMOL/L — SIGNIFICANT CHANGE UP (ref 3.5–5)
POTASSIUM SERPL-SCNC: 4.5 MMOL/L — SIGNIFICANT CHANGE UP (ref 3.5–5)
PROCALCITONIN SERPL-MCNC: 0.18 NG/ML — HIGH (ref 0.02–0.1)
PROT SERPL-MCNC: 7.2 G/DL — SIGNIFICANT CHANGE UP (ref 6–8)
RBC # BLD: 4.87 M/UL — SIGNIFICANT CHANGE UP (ref 4.2–5.4)
RBC # FLD: 15.3 % — HIGH (ref 11.5–14.5)
SODIUM SERPL-SCNC: 140 MMOL/L — SIGNIFICANT CHANGE UP (ref 135–146)
WBC # BLD: 10.53 K/UL — SIGNIFICANT CHANGE UP (ref 4.8–10.8)
WBC # FLD AUTO: 10.53 K/UL — SIGNIFICANT CHANGE UP (ref 4.8–10.8)

## 2025-02-16 PROCEDURE — 93010 ELECTROCARDIOGRAM REPORT: CPT

## 2025-02-16 PROCEDURE — 99232 SBSQ HOSP IP/OBS MODERATE 35: CPT

## 2025-02-16 RX ORDER — METHYLPREDNISOLONE ACETATE 80 MG/ML
40 INJECTION, SUSPENSION INTRA-ARTICULAR; INTRALESIONAL; INTRAMUSCULAR; SOFT TISSUE DAILY
Refills: 0 | Status: DISCONTINUED | OUTPATIENT
Start: 2025-02-16 | End: 2025-02-17

## 2025-02-16 RX ORDER — IPRATROPIUM BROMIDE AND ALBUTEROL SULFATE .5; 2.5 MG/3ML; MG/3ML
3 SOLUTION RESPIRATORY (INHALATION) EVERY 6 HOURS
Refills: 0 | Status: DISCONTINUED | OUTPATIENT
Start: 2025-02-16 | End: 2025-02-17

## 2025-02-16 RX ADMIN — ENOXAPARIN SODIUM 40 MILLIGRAM(S): 100 INJECTION SUBCUTANEOUS at 05:45

## 2025-02-16 RX ADMIN — Medication 100 MILLIGRAM(S): at 05:45

## 2025-02-16 RX ADMIN — METHYLPREDNISOLONE ACETATE 60 MILLIGRAM(S): 80 INJECTION, SUSPENSION INTRA-ARTICULAR; INTRALESIONAL; INTRAMUSCULAR; SOFT TISSUE at 05:45

## 2025-02-16 RX ADMIN — Medication 1 DOSE(S): at 08:00

## 2025-02-16 RX ADMIN — MONTELUKAST SODIUM 10 MILLIGRAM(S): 10 TABLET ORAL at 12:44

## 2025-02-16 RX ADMIN — ANASTROZOLE 1 MILLIGRAM(S): 1 TABLET ORAL at 12:44

## 2025-02-16 RX ADMIN — IPRATROPIUM BROMIDE AND ALBUTEROL SULFATE 3 MILLILITER(S): .5; 2.5 SOLUTION RESPIRATORY (INHALATION) at 07:30

## 2025-02-16 RX ADMIN — Medication 100 MILLIGRAM(S): at 17:45

## 2025-02-16 RX ADMIN — IPRATROPIUM BROMIDE AND ALBUTEROL SULFATE 3 MILLILITER(S): .5; 2.5 SOLUTION RESPIRATORY (INHALATION) at 13:26

## 2025-02-16 RX ADMIN — Medication 1 DROP(S): at 17:46

## 2025-02-16 RX ADMIN — ATORVASTATIN CALCIUM 20 MILLIGRAM(S): 80 TABLET, FILM COATED ORAL at 21:17

## 2025-02-16 RX ADMIN — Medication 1 DOSE(S): at 21:17

## 2025-02-16 RX ADMIN — Medication 1 DROP(S): at 05:53

## 2025-02-16 RX ADMIN — Medication 40 MILLIGRAM(S): at 05:45

## 2025-02-16 NOTE — PROGRESS NOTE ADULT - ASSESSMENT
65-year-old female with a past medical history of asthma (never intubated), hyperlipidemia, and breast cancer (status-post mastectomy, chemotherapy, and radiation) presented to the ED with a three-day history of worsening cough and shortness of breath accompanied by dyspnea on exertion i.e walking or speaking . A productive cough with green sputum developed two days prior to presentation. She reported using her nebulizer and rescue inhaler without relief and had taken prednisone 40mg for the past two days, also without improvement. She reported an increasing audible wheeze   She denied fever, chest pain, abdominal pain, nausea, vomiting, diarrhea, constipation, urinary symptoms, or recent travel.       #Asthma Exacerbation :   -In Ed on vitals :   BP :124 /77 mm Hg ,Heart Rate:79 /min  RR: 22 /min, afebrile on NC   -lactate of 2.3 on VBG   -RVP negative   -CXray showed haziness as per previous Cxray (pending official read)   -s/p Augmentin  875 mg stat , Duonebs and Magnesium 2 gm stat   -taking prednisone 40 mg daily at home   PLAN :   -wean oxygen as tolerated   -DUONEBS   -Prednisone 60 mg daily   -continued the home  Advir BID and spriva   -f/u repeat Cxray in am     #Hyperlipidemia :   -c/w  home meds     #breast cancer   -status-post mastectomy, chemotherapy, and radiation      MISC:  GI ppx: PPI  DVT ppx: lovenox  Diet: DASH  Activity:ATT  Dispo: Acute

## 2025-02-16 NOTE — PROGRESS NOTE ADULT - ATTENDING COMMENTS
Medicine Attending Addendum  Patient was seen and examined with medicine team.  Nursing records reviewed. I agree with the resident/PA/NP's note including past medical history, home medications, social history, allergies, surgical history, family history, and review of system. I have reviewed relevant vitals, laboratory values, imaging studies, and microbiology.   - Above resident's note was edited by me  - no acute complaints  - taper IV steroid to once daily. Will switch to PO steroid tomorrow  - dispo planning  - rest of A/P as per detailed housestaff note above except above modifications    Total time spent to complete patient's bedside assessment, reviewed medical chart, discussed medical plan of care with team was 45 minutes with >50% of time spent face to face with patient, discussion with patient/family and/or coordination of care.

## 2025-02-16 NOTE — PROGRESS NOTE ADULT - SUBJECTIVE AND OBJECTIVE BOX
SUBJECTIVE/OVERNIGHT EVENTS  Today is hospital day 1d. This morning patient was seen and examined at bedside, resting comfortably in bed. No acute or major events overnight.        MEDICATIONS  STANDING MEDICATIONS  albuterol    90 MICROgram(s) HFA Inhaler 3 Puff(s) Inhalation every 6 hours  albuterol/ipratropium for Nebulization 3 milliLiter(s) Nebulizer every 6 hours  anastrozole 1 milliGRAM(s) Oral daily  artificial  tears Solution 1 Drop(s) Both EYES two times a day  atorvastatin 20 milliGRAM(s) Oral at bedtime  doxycycline IVPB 100 milliGRAM(s) IV Intermittent every 12 hours  doxycycline IVPB      enoxaparin Injectable 40 milliGRAM(s) SubCutaneous every 24 hours  fluticasone propionate/ salmeterol 250-50 MICROgram(s) Diskus 1 Dose(s) Inhalation two times a day  influenza  Vaccine (HIGH DOSE) 0.5 milliLiter(s) IntraMuscular once  methylPREDNISolone sodium succinate Injectable 60 milliGRAM(s) IV Push two times a day  montelukast 10 milliGRAM(s) Oral daily  pantoprazole    Tablet 40 milliGRAM(s) Oral before breakfast    PRN MEDICATIONS    VITALS  T(F): 98.3 (02-16-25 @ 04:40), Max: 98.9 (02-15-25 @ 13:27)  HR: 77 (02-16-25 @ 04:40) (77 - 87)  BP: 104/67 (02-16-25 @ 04:40) (104/67 - 119/77)  RR: 18 (02-16-25 @ 04:40) (18 - 18)  SpO2: 97% (02-16-25 @ 04:40) (94% - 99%)             LABS             14.2   6.52  )-----------( 336      ( 02-15-25 @ 08:57 )             41.8     140  |  104  |  20  -------------------------<  108   02-15-25 @ 08:57  4.4  |  24  |  0.6    Ca      9.6     02-15-25 @ 08:57  Mg     2.3     02-15-25 @ 08:57    TPro  6.9  /  Alb  4.3  /  TBili  0.3  /  DBili  x   /  AST  19  /  ALT  24  /  AlkPhos  74  /  GGT  x     02-15-25 @ 08:57        Urinalysis Basic - ( 15 Feb 2025 08:57 )    Color: x / Appearance: x / SG: x / pH: x  Gluc: 108 mg/dL / Ketone: x  / Bili: x / Urobili: x   Blood: x / Protein: x / Nitrite: x   Leuk Esterase: x / RBC: x / WBC x   Sq Epi: x / Non Sq Epi: x / Bacteria: x          IMAGING      ASSESSMENT AND PLAN:

## 2025-02-17 ENCOUNTER — TRANSCRIPTION ENCOUNTER (OUTPATIENT)
Age: 65
End: 2025-02-17

## 2025-02-17 VITALS
OXYGEN SATURATION: 95 % | SYSTOLIC BLOOD PRESSURE: 103 MMHG | RESPIRATION RATE: 18 BRPM | TEMPERATURE: 98 F | HEART RATE: 88 BPM | DIASTOLIC BLOOD PRESSURE: 66 MMHG

## 2025-02-17 LAB
ALBUMIN SERPL ELPH-MCNC: 3.9 G/DL — SIGNIFICANT CHANGE UP (ref 3.5–5.2)
ALP SERPL-CCNC: 71 U/L — SIGNIFICANT CHANGE UP (ref 30–115)
ALT FLD-CCNC: 18 U/L — SIGNIFICANT CHANGE UP (ref 0–41)
ANION GAP SERPL CALC-SCNC: 13 MMOL/L — SIGNIFICANT CHANGE UP (ref 7–14)
AST SERPL-CCNC: 15 U/L — SIGNIFICANT CHANGE UP (ref 0–41)
BASOPHILS # BLD AUTO: 0.04 K/UL — SIGNIFICANT CHANGE UP (ref 0–0.2)
BASOPHILS NFR BLD AUTO: 0.4 % — SIGNIFICANT CHANGE UP (ref 0–1)
BILIRUB SERPL-MCNC: 0.3 MG/DL — SIGNIFICANT CHANGE UP (ref 0.2–1.2)
BUN SERPL-MCNC: 32 MG/DL — HIGH (ref 10–20)
CALCIUM SERPL-MCNC: 9.3 MG/DL — SIGNIFICANT CHANGE UP (ref 8.4–10.4)
CHLORIDE SERPL-SCNC: 103 MMOL/L — SIGNIFICANT CHANGE UP (ref 98–110)
CO2 SERPL-SCNC: 23 MMOL/L — SIGNIFICANT CHANGE UP (ref 17–32)
CREAT SERPL-MCNC: 0.8 MG/DL — SIGNIFICANT CHANGE UP (ref 0.7–1.5)
EGFR: 81 ML/MIN/1.73M2 — SIGNIFICANT CHANGE UP
EOSINOPHIL # BLD AUTO: 0.06 K/UL — SIGNIFICANT CHANGE UP (ref 0–0.7)
EOSINOPHIL NFR BLD AUTO: 0.7 % — SIGNIFICANT CHANGE UP (ref 0–8)
GLUCOSE SERPL-MCNC: 106 MG/DL — HIGH (ref 70–99)
HCT VFR BLD CALC: 42.4 % — SIGNIFICANT CHANGE UP (ref 37–47)
HGB BLD-MCNC: 13.9 G/DL — SIGNIFICANT CHANGE UP (ref 12–16)
IMM GRANULOCYTES NFR BLD AUTO: 0.6 % — HIGH (ref 0.1–0.3)
LACTATE SERPL-SCNC: 2.5 MMOL/L — HIGH (ref 0.7–2)
LYMPHOCYTES # BLD AUTO: 1.94 K/UL — SIGNIFICANT CHANGE UP (ref 1.2–3.4)
LYMPHOCYTES # BLD AUTO: 21.6 % — SIGNIFICANT CHANGE UP (ref 20.5–51.1)
MAGNESIUM SERPL-MCNC: 2 MG/DL — SIGNIFICANT CHANGE UP (ref 1.8–2.4)
MCHC RBC-ENTMCNC: 30 PG — SIGNIFICANT CHANGE UP (ref 27–31)
MCHC RBC-ENTMCNC: 32.8 G/DL — SIGNIFICANT CHANGE UP (ref 32–37)
MCV RBC AUTO: 91.4 FL — SIGNIFICANT CHANGE UP (ref 81–99)
MONOCYTES # BLD AUTO: 0.6 K/UL — SIGNIFICANT CHANGE UP (ref 0.1–0.6)
MONOCYTES NFR BLD AUTO: 6.7 % — SIGNIFICANT CHANGE UP (ref 1.7–9.3)
NEUTROPHILS # BLD AUTO: 6.28 K/UL — SIGNIFICANT CHANGE UP (ref 1.4–6.5)
NEUTROPHILS NFR BLD AUTO: 70 % — SIGNIFICANT CHANGE UP (ref 42.2–75.2)
NRBC BLD AUTO-RTO: 0 /100 WBCS — SIGNIFICANT CHANGE UP (ref 0–0)
PHOSPHATE SERPL-MCNC: 4 MG/DL — SIGNIFICANT CHANGE UP (ref 2.1–4.9)
PLATELET # BLD AUTO: 347 K/UL — SIGNIFICANT CHANGE UP (ref 130–400)
PMV BLD: 9.3 FL — SIGNIFICANT CHANGE UP (ref 7.4–10.4)
POTASSIUM SERPL-MCNC: 4.5 MMOL/L — SIGNIFICANT CHANGE UP (ref 3.5–5)
POTASSIUM SERPL-SCNC: 4.5 MMOL/L — SIGNIFICANT CHANGE UP (ref 3.5–5)
PROT SERPL-MCNC: 6.3 G/DL — SIGNIFICANT CHANGE UP (ref 6–8)
RBC # BLD: 4.64 M/UL — SIGNIFICANT CHANGE UP (ref 4.2–5.4)
RBC # FLD: 15.5 % — HIGH (ref 11.5–14.5)
SODIUM SERPL-SCNC: 139 MMOL/L — SIGNIFICANT CHANGE UP (ref 135–146)
WBC # BLD: 8.97 K/UL — SIGNIFICANT CHANGE UP (ref 4.8–10.8)
WBC # FLD AUTO: 8.97 K/UL — SIGNIFICANT CHANGE UP (ref 4.8–10.8)

## 2025-02-17 PROCEDURE — 99239 HOSP IP/OBS DSCHRG MGMT >30: CPT

## 2025-02-17 RX ORDER — DOXYCYCLINE HYCLATE 100 MG
1 TABLET ORAL
Qty: 6 | Refills: 0
Start: 2025-02-17 | End: 2025-02-19

## 2025-02-17 RX ORDER — POLYETHYLENE GLYCOL 3350 17 G/17G
17 POWDER, FOR SOLUTION ORAL ONCE
Refills: 0 | Status: COMPLETED | OUTPATIENT
Start: 2025-02-17 | End: 2025-02-17

## 2025-02-17 RX ORDER — PREDNISONE 20 MG/1
1 TABLET ORAL
Qty: 300 | Refills: 0
Start: 2025-02-17

## 2025-02-17 RX ORDER — MELATONIN 5 MG
1 TABLET ORAL
Qty: 30 | Refills: 0
Start: 2025-02-17 | End: 2025-03-18

## 2025-02-17 RX ORDER — MELATONIN 5 MG
3 TABLET ORAL AT BEDTIME
Refills: 0 | Status: DISCONTINUED | OUTPATIENT
Start: 2025-02-17 | End: 2025-02-17

## 2025-02-17 RX ORDER — PREDNISONE 20 MG/1
50 TABLET ORAL DAILY
Refills: 0 | Status: DISCONTINUED | OUTPATIENT
Start: 2025-02-17 | End: 2025-02-17

## 2025-02-17 RX ADMIN — METHYLPREDNISOLONE ACETATE 40 MILLIGRAM(S): 80 INJECTION, SUSPENSION INTRA-ARTICULAR; INTRALESIONAL; INTRAMUSCULAR; SOFT TISSUE at 05:22

## 2025-02-17 RX ADMIN — IPRATROPIUM BROMIDE AND ALBUTEROL SULFATE 3 MILLILITER(S): .5; 2.5 SOLUTION RESPIRATORY (INHALATION) at 13:57

## 2025-02-17 RX ADMIN — Medication 1 DROP(S): at 05:26

## 2025-02-17 RX ADMIN — MONTELUKAST SODIUM 10 MILLIGRAM(S): 10 TABLET ORAL at 11:43

## 2025-02-17 RX ADMIN — ANASTROZOLE 1 MILLIGRAM(S): 1 TABLET ORAL at 11:43

## 2025-02-17 RX ADMIN — ENOXAPARIN SODIUM 40 MILLIGRAM(S): 100 INJECTION SUBCUTANEOUS at 05:22

## 2025-02-17 RX ADMIN — Medication 40 MILLIGRAM(S): at 05:21

## 2025-02-17 RX ADMIN — Medication 100 MILLIGRAM(S): at 05:21

## 2025-02-17 RX ADMIN — IPRATROPIUM BROMIDE AND ALBUTEROL SULFATE 3 MILLILITER(S): .5; 2.5 SOLUTION RESPIRATORY (INHALATION) at 07:45

## 2025-02-17 RX ADMIN — POLYETHYLENE GLYCOL 3350 17 GRAM(S): 17 POWDER, FOR SOLUTION ORAL at 03:43

## 2025-02-17 NOTE — DISCHARGE NOTE PROVIDER - CARE PROVIDER_API CALL
Javi Peña Chad  Pulmonary Disease  [address]  [place] [address]  Phone: [phone]  Fax: [phone]  Follow Up Time: 2 weeks    Charly Alvarado  Internal Medicine  [facility] [place] [address]  Phone: [phone]  Fax: [phone]  Follow Up Time: 1 month

## 2025-02-17 NOTE — DISCHARGE NOTE NURSING/CASE MANAGEMENT/SOCIAL WORK - FINANCIAL ASSISTANCE
[facility] provides services at a reduced cost to those who are determined to be eligible through [facility]’s financial assistance program. Information regarding [facility]’s financial assistance program can be found by going to [email or web address] or by calling [phone].

## 2025-02-17 NOTE — DISCHARGE NOTE PROVIDER - CARE PROVIDERS DIRECT ADDRESSES
[email or web address]

## 2025-02-17 NOTE — DISCHARGE NOTE PROVIDER - PROVIDER TOKENS
PROVIDER:[TOKEN:[[id number]],FOLLOWUP:[2 weeks]],PROVIDER:[TOKEN:[[id number]],FOLLOWUP:[1 month]]

## 2025-02-17 NOTE — PHYSICAL THERAPY INITIAL EVALUATION ADULT - PERTINENT HX OF CURRENT PROBLEM, REHAB EVAL
65-year-old female with a past medical history of asthma (never intubated), hyperlipidemia, and breast cancer (status-post mastectomy, chemotherapy, and radiation) presented to the ED with a three-day history of worsening cough and shortness of breath accompanied by dyspnea on exertion i.e walking or speaking . A productive cough with green sputum developed two days prior to presentation.

## 2025-02-17 NOTE — PHYSICAL THERAPY INITIAL EVALUATION ADULT - GENERAL OBSERVATIONS, REHAB EVAL
PT IE/d/c [id number]. Chart reviewed. pt encountered semi-reclined in bed. In NAD. + IV lock. pt is alert, oriented x 4 and is able to follow commands.

## 2025-02-17 NOTE — DISCHARGE NOTE NURSING/CASE MANAGEMENT/SOCIAL WORK - NSDCPEFALRISK_GEN_ALL_CORE
For information on Fall & Injury Prevention, visit: [email or web address] OR  [email or web address] OR  https://www.cdc.gov/steadi/patient.html

## 2025-02-17 NOTE — DISCHARGE NOTE NURSING/CASE MANAGEMENT/SOCIAL WORK - NSPROEXTENSIONSOFSELF_GEN_A_NUR
[FreeTextEntry1] : 33 year old F with no signifcant past medical hx  now referred during 5th pregnancy with anemia currently 32 weeks pregnant \par MARGOT: Oct 16 2022 \par \par 22 WBC 10, Hb 9.4, Plt 287 \par 22: wbc 8.5 Hb 9.5 Plt 255\par 5/15/20: WBC 9.06, Hb 13.5 Plt 314 \par \par Hb electrophoreiss in  and in : Hb A 66, Hb A 2 3.3,  Beta-chain variant trait\par - Send B12/ Folate/ Iron panel \par - advised to continue oral iron \par - start stool softener \par - Continue Prenatals\par - will call patient with results\par -  f/u with Sadie in 4 weeks \par \par 
none

## 2025-02-17 NOTE — DISCHARGE NOTE PROVIDER - HOSPITAL COURSE
65-year-old female with a past medical history of asthma (never intubated), hyperlipidemia, and breast cancer (status-post mastectomy, chemotherapy, and radiation) presented to the ED with a three-day history of worsening cough and shortness of breath accompanied by dyspnea on exertion i.e walking or speaking . A productive cough with green sputum developed two days prior to presentation. She reported using her nebulizer and rescue inhaler without relief and had taken prednisone 40mg for the past two days, also without improvement. She reported an increasing audible wheeze. Pt was admitted for an asthma exacerbation. No SIRS on admission and infectious w/u was negative. CXR showed L lung haziness but unchanged from prior.    Pt was started on IV Solumedrol, Duonebs, Advair. Pt was weaned off oxygen and clinically improved. Pt was also started on empiric Doxycycline.    Of note, per patient, she DOES NOT take prednisone daily, she only takes it when she has an exacerbation.    Pt was examined today at bedside. Pt is stable and ready for discharge home. To complete steroid taper and doxycycline course.    VITALS:   T(C): 36.3 (02-17-25 @ 04:27), Max: 36.8 (02-16-25 @ 12:12)  HR: 80 (02-17-25 @ 04:27) (74 - 80)  BP: 112/75 (02-17-25 @ 04:27) (102/63 - 112/75)  RR: 18 (02-17-25 @ 04:27) (18 - 18)  SpO2: 96% (02-17-25 @ 04:27) (94% - 96%)    GENERAL: NAD, lying in bed comfortably  HEART: Regular rate and rhythm, no murmurs, rubs, or gallops  LUNGS: Unlabored respirations.  Clear to auscultation bilaterally, no crackles, wheezing, or rhonchi  ABDOMEN: Soft, nontender, nondistended, +BS  EXTREMITIES: No clubbing, cyanosis, or edema  NERVOUS SYSTEM:  A&Ox3  SKIN: No rashes or lesions

## 2025-02-17 NOTE — DISCHARGE NOTE NURSING/CASE MANAGEMENT/SOCIAL WORK - PATIENT PORTAL LINK FT
You can access the FollowMyHealth Patient Portal offered by [facility] by registering at the following website: [email or web address]. By joining [facility]’s FollowMyHealth portal, you will also be able to view your health information using other applications (apps) compatible with our system.

## 2025-02-17 NOTE — DISCHARGE NOTE PROVIDER - NSDCMRMEDTOKEN_GEN_ALL_CORE_FT
Advair Diskus 250 mcg-50 mcg inhalation powder: 1 inhaled 2 times a day  Albuterol (Eqv-ProAir HFA) 90 mcg/inh inhalation aerosol: 2 puff(s) inhaled every 6 hours as needed for  shortness of breath and/or wheezing  anastrozole 1 mg oral tablet: 1 tab(s) orally once a day  doxycycline monohydrate 100 mg oral tablet: 1 tab(s) orally 2 times a day Take twice a day, last day of course 2/19/2025  montelukast 10 mg oral tablet: 1 tab(s) orally once a day  predniSONE 10 mg oral tablet: 1 tab(s) orally once a day Take prednisone taper as follows:  40 milligrams for 3 days (4 tablets)  30 milligrams for 3 days (3 tablets)  20 milligrams for 3 days (2 tablets)  10 milligrams for 3 days (1 tablet)  Protonix 40 mg oral delayed release tablet: 1 tab(s) orally once a day  simvastatin 20 mg oral tablet: 1 tab(s) orally once a day (at bedtime)  Spiriva HandiHaler 18 mcg inhalation capsule: 1 cap(s) inhaled once a day   Advair Diskus 250 mcg-50 mcg inhalation powder: 1 inhaled 2 times a day  Albuterol (Eqv-ProAir HFA) 90 mcg/inh inhalation aerosol: 2 puff(s) inhaled every 6 hours as needed for  shortness of breath and/or wheezing  anastrozole 1 mg oral tablet: 1 tab(s) orally once a day  doxycycline monohydrate 100 mg oral tablet: 1 tab(s) orally 2 times a day Take twice a day, last day of course 2/19/2025  melatonin 3 mg oral tablet: 1 tab(s) orally once a day (at bedtime) as needed for  insomnia  montelukast 10 mg oral tablet: 1 tab(s) orally once a day  predniSONE 10 mg oral tablet: 1 tab(s) orally once a day Take prednisone taper as follows:  40 milligrams for 3 days (4 tablets)  30 milligrams for 3 days (3 tablets)  20 milligrams for 3 days (2 tablets)  10 milligrams for 3 days (1 tablet)  Protonix 40 mg oral delayed release tablet: 1 tab(s) orally once a day  simvastatin 20 mg oral tablet: 1 tab(s) orally once a day (at bedtime)  Spiriva HandiHaler 18 mcg inhalation capsule: 1 cap(s) inhaled once a day

## 2025-02-17 NOTE — PHYSICAL THERAPY INITIAL EVALUATION ADULT - NSPTDISCHREC_GEN_A_CORE
pt being d/c from PT services secondary to no skilled PT needs identified. [facility] 24/No skilled PT needs

## 2025-02-20 DIAGNOSIS — Z92.3 PERSONAL HISTORY OF IRRADIATION: ICD-10-CM

## 2025-02-20 DIAGNOSIS — Z85.3 PERSONAL HISTORY OF MALIGNANT NEOPLASM OF BREAST: ICD-10-CM

## 2025-02-20 DIAGNOSIS — Z79.899 OTHER LONG TERM (CURRENT) DRUG THERAPY: ICD-10-CM

## 2025-02-20 DIAGNOSIS — Z91.041 RADIOGRAPHIC DYE ALLERGY STATUS: ICD-10-CM

## 2025-02-20 DIAGNOSIS — Z79.52 LONG TERM (CURRENT) USE OF SYSTEMIC STEROIDS: ICD-10-CM

## 2025-02-20 DIAGNOSIS — E78.5 HYPERLIPIDEMIA, UNSPECIFIED: ICD-10-CM

## 2025-02-20 DIAGNOSIS — Z92.21 PERSONAL HISTORY OF ANTINEOPLASTIC CHEMOTHERAPY: ICD-10-CM

## 2025-02-20 DIAGNOSIS — Z11.52 ENCOUNTER FOR SCREENING FOR COVID-19: ICD-10-CM

## 2025-02-20 DIAGNOSIS — J45.901 UNSPECIFIED ASTHMA WITH (ACUTE) EXACERBATION: ICD-10-CM

## 2025-02-20 DIAGNOSIS — Z79.811 LONG TERM (CURRENT) USE OF AROMATASE INHIBITORS: ICD-10-CM

## 2025-02-20 DIAGNOSIS — Z79.51 LONG TERM (CURRENT) USE OF INHALED STEROIDS: ICD-10-CM

## 2025-02-20 LAB
CULTURE RESULTS: SIGNIFICANT CHANGE UP
CULTURE RESULTS: SIGNIFICANT CHANGE UP
SPECIMEN SOURCE: SIGNIFICANT CHANGE UP
SPECIMEN SOURCE: SIGNIFICANT CHANGE UP

## 2025-04-19 ENCOUNTER — INPATIENT (INPATIENT)
Facility: HOSPITAL | Age: 65
LOS: 1 days | Discharge: HOME | DRG: 202 | End: 2025-04-21
Attending: INTERNAL MEDICINE | Admitting: INTERNAL MEDICINE
Payer: COMMERCIAL

## 2025-04-19 VITALS
WEIGHT: 149.91 LBS | SYSTOLIC BLOOD PRESSURE: 111 MMHG | DIASTOLIC BLOOD PRESSURE: 74 MMHG | RESPIRATION RATE: 18 BRPM | HEART RATE: 95 BPM | HEIGHT: 66 IN | OXYGEN SATURATION: 95 % | TEMPERATURE: 98 F

## 2025-04-19 DIAGNOSIS — E87.20 ACIDOSIS, UNSPECIFIED: ICD-10-CM

## 2025-04-19 DIAGNOSIS — Z92.3 PERSONAL HISTORY OF IRRADIATION: ICD-10-CM

## 2025-04-19 DIAGNOSIS — Z91.041 RADIOGRAPHIC DYE ALLERGY STATUS: ICD-10-CM

## 2025-04-19 DIAGNOSIS — Z79.51 LONG TERM (CURRENT) USE OF INHALED STEROIDS: ICD-10-CM

## 2025-04-19 DIAGNOSIS — J96.01 ACUTE RESPIRATORY FAILURE WITH HYPOXIA: ICD-10-CM

## 2025-04-19 DIAGNOSIS — J45.901 UNSPECIFIED ASTHMA WITH (ACUTE) EXACERBATION: ICD-10-CM

## 2025-04-19 DIAGNOSIS — Z92.21 PERSONAL HISTORY OF ANTINEOPLASTIC CHEMOTHERAPY: ICD-10-CM

## 2025-04-19 DIAGNOSIS — Z85.3 PERSONAL HISTORY OF MALIGNANT NEOPLASM OF BREAST: ICD-10-CM

## 2025-04-19 DIAGNOSIS — E78.5 HYPERLIPIDEMIA, UNSPECIFIED: ICD-10-CM

## 2025-04-19 DIAGNOSIS — Z90.11 ACQUIRED ABSENCE OF RIGHT BREAST AND NIPPLE: ICD-10-CM

## 2025-04-19 LAB
ALBUMIN SERPL ELPH-MCNC: 4.1 G/DL — SIGNIFICANT CHANGE UP (ref 3.5–5.2)
ALP SERPL-CCNC: 67 U/L — SIGNIFICANT CHANGE UP (ref 30–115)
ALT FLD-CCNC: 26 U/L — SIGNIFICANT CHANGE UP (ref 0–41)
ANION GAP SERPL CALC-SCNC: 14 MMOL/L — SIGNIFICANT CHANGE UP (ref 7–14)
AST SERPL-CCNC: 22 U/L — SIGNIFICANT CHANGE UP (ref 0–41)
BASOPHILS # BLD AUTO: 0 K/UL — SIGNIFICANT CHANGE UP (ref 0–0.2)
BASOPHILS NFR BLD AUTO: 0 % — SIGNIFICANT CHANGE UP (ref 0–1)
BILIRUB SERPL-MCNC: 0.7 MG/DL — SIGNIFICANT CHANGE UP (ref 0.2–1.2)
BUN SERPL-MCNC: 11 MG/DL — SIGNIFICANT CHANGE UP (ref 10–20)
CALCIUM SERPL-MCNC: 9.2 MG/DL — SIGNIFICANT CHANGE UP (ref 8.4–10.5)
CHLORIDE SERPL-SCNC: 105 MMOL/L — SIGNIFICANT CHANGE UP (ref 98–110)
CO2 SERPL-SCNC: 23 MMOL/L — SIGNIFICANT CHANGE UP (ref 17–32)
CREAT SERPL-MCNC: 0.6 MG/DL — LOW (ref 0.7–1.5)
EGFR: 99 ML/MIN/1.73M2 — SIGNIFICANT CHANGE UP
EGFR: 99 ML/MIN/1.73M2 — SIGNIFICANT CHANGE UP
EOSINOPHIL # BLD AUTO: 0 K/UL — SIGNIFICANT CHANGE UP (ref 0–0.7)
EOSINOPHIL NFR BLD AUTO: 0 % — SIGNIFICANT CHANGE UP (ref 0–8)
FLUAV AG NPH QL: SIGNIFICANT CHANGE UP
FLUBV AG NPH QL: SIGNIFICANT CHANGE UP
GAS PNL BLDV: SIGNIFICANT CHANGE UP
GLUCOSE SERPL-MCNC: 160 MG/DL — HIGH (ref 70–99)
HCT VFR BLD CALC: 39.4 % — SIGNIFICANT CHANGE UP (ref 37–47)
HGB BLD-MCNC: 13.3 G/DL — SIGNIFICANT CHANGE UP (ref 12–16)
LYMPHOCYTES # BLD AUTO: 0 % — LOW (ref 20.5–51.1)
LYMPHOCYTES # BLD AUTO: 0 K/UL — LOW (ref 1.2–3.4)
MCHC RBC-ENTMCNC: 30 PG — SIGNIFICANT CHANGE UP (ref 27–31)
MCHC RBC-ENTMCNC: 33.8 G/DL — SIGNIFICANT CHANGE UP (ref 32–37)
MCV RBC AUTO: 88.7 FL — SIGNIFICANT CHANGE UP (ref 81–99)
MONOCYTES # BLD AUTO: 0.07 K/UL — LOW (ref 0.1–0.6)
MONOCYTES NFR BLD AUTO: 0.9 % — LOW (ref 1.7–9.3)
NEUTROPHILS # BLD AUTO: 7.48 K/UL — HIGH (ref 1.4–6.5)
NEUTROPHILS NFR BLD AUTO: 97.3 % — HIGH (ref 42.2–75.2)
PLATELET # BLD AUTO: 335 K/UL — SIGNIFICANT CHANGE UP (ref 130–400)
PMV BLD: 9.6 FL — SIGNIFICANT CHANGE UP (ref 7.4–10.4)
POTASSIUM SERPL-MCNC: 4.4 MMOL/L — SIGNIFICANT CHANGE UP (ref 3.5–5)
POTASSIUM SERPL-SCNC: 4.4 MMOL/L — SIGNIFICANT CHANGE UP (ref 3.5–5)
PROT SERPL-MCNC: 6.6 G/DL — SIGNIFICANT CHANGE UP (ref 6–8)
RBC # BLD: 4.44 M/UL — SIGNIFICANT CHANGE UP (ref 4.2–5.4)
RBC # FLD: 14.1 % — SIGNIFICANT CHANGE UP (ref 11.5–14.5)
RSV RNA NPH QL NAA+NON-PROBE: SIGNIFICANT CHANGE UP
SARS-COV-2 RNA SPEC QL NAA+PROBE: SIGNIFICANT CHANGE UP
SODIUM SERPL-SCNC: 142 MMOL/L — SIGNIFICANT CHANGE UP (ref 135–146)
SOURCE RESPIRATORY: SIGNIFICANT CHANGE UP
TROPONIN T, HIGH SENSITIVITY RESULT: <6 NG/L — SIGNIFICANT CHANGE UP (ref 6–13)
WBC # BLD: 7.69 K/UL — SIGNIFICANT CHANGE UP (ref 4.8–10.8)
WBC # FLD AUTO: 7.69 K/UL — SIGNIFICANT CHANGE UP (ref 4.8–10.8)

## 2025-04-19 PROCEDURE — 93010 ELECTROCARDIOGRAM REPORT: CPT

## 2025-04-19 PROCEDURE — 71045 X-RAY EXAM CHEST 1 VIEW: CPT | Mod: 26

## 2025-04-19 PROCEDURE — 99285 EMERGENCY DEPT VISIT HI MDM: CPT

## 2025-04-19 RX ORDER — IPRATROPIUM BROMIDE AND ALBUTEROL SULFATE .5; 2.5 MG/3ML; MG/3ML
3 SOLUTION RESPIRATORY (INHALATION) ONCE
Refills: 0 | Status: COMPLETED | OUTPATIENT
Start: 2025-04-19 | End: 2025-04-19

## 2025-04-19 RX ORDER — DEXAMETHASONE 0.5 MG/1
10 TABLET ORAL ONCE
Refills: 0 | Status: COMPLETED | OUTPATIENT
Start: 2025-04-19 | End: 2025-04-19

## 2025-04-19 RX ADMIN — IPRATROPIUM BROMIDE AND ALBUTEROL SULFATE 3 MILLILITER(S): .5; 2.5 SOLUTION RESPIRATORY (INHALATION) at 21:30

## 2025-04-19 RX ADMIN — DEXAMETHASONE 10 MILLIGRAM(S): 0.5 TABLET ORAL at 21:30

## 2025-04-19 NOTE — ED PROVIDER NOTE - OBJECTIVE STATEMENT
65F with PMHx Asthma, HLD, Breast Cancer s/p R. Mastectomy s/p Chemo in remission, who presents to the ED for cough, wheezing, and shortness of breath. Patient refers that she has a history of severe asthma, never requiring intubation, but necessitating respiratory support and hospital admission. Patient refers that 4 days ago she started to feel short of breath and was coughing. Patient tried her inhaler, but it did not resolve her symptoms so she went to her PCP the following day who evaluated her and sent her home with Prednisone and Azithromycin. Patient took the medications as instructed, but refers that her symptoms have not improved and that her chest hurts when she coughs so she decided to come to the ED to be evaluated. Otherwise denies fevers, headache, chills, n/v/d, abd pain, back pain, changes in urinary/stool habitus, calf tenderness or swelling, recent travel, and sick contacts. Uses albuterol inhaler and nebulizer PRN. Has local PCP. Routine vaccinations up-to-date. Does not smoke, drink, or use illicit substances. Works for [employer].

## 2025-04-19 NOTE — ED PROVIDER NOTE - PHYSICAL EXAMINATION
CONSTITUTIONAL: well developed, nontoxic appearing, in no acute distress, speaking in full sentences  SKIN: warm, dry, no rash, cap refill < 2 seconds  HEENT: normocephalic, atraumatic, no conjunctival erythema, moist mucous membranes, patent airway  NECK: supple  CV:  regular rate, regular rhythm, 2+ radial pulses bilaterally  RESP: wheezing bilaterally, no crackles/rhonci, tachypneic, no suprasternal/intercostal/abdominal retractions, equal bilateral chest rise.   ABD: soft, no tenderness, nondistended, no rebound, no guarding  MSK: normal ROM, no cyanosis, no edema  NEURO: alert, oriented, grossly unremarkable  PSYCH: cooperative, appropriate

## 2025-04-19 NOTE — ED ADULT TRIAGE NOTE - CHIEF COMPLAINT QUOTE
[facility]  Division of Infectious Diseases  [phone]    Name: FESTUS BLAND  Age: 78y  Gender: Male  MRN: [id number]    Interval History--  Notes reviewed.     Past Medical History--  Hypercholesterolemia    DM (Diabetes Mellitus)    HTN (Hypertension)    CAD (Coronary Artery Disease)    BPH (benign prostatic hyperplasia)    Coronary Stent        For details regarding the patient's social history, family history, and other miscellaneous elements, please refer the initial infectious diseases consultation and/or the admitting history and physical examination for this admission.    Allergies    No Known Allergies    Intolerances        Medications--  Antibiotics:  cefTRIAXone   IVPB 2000 milliGRAM(s) IV Intermittent every 24 hours  cefTRIAXone   IVPB        Immunologic:    Other:  atorvastatin  carbidopa/levodopa  25/100  dextrose 40% Gel  dextrose 5%.  dextrose 5%.  dextrose 50% Injectable  dextrose 50% Injectable  dextrose 50% Injectable  enoxaparin Injectable  glucagon  Injectable  insulin lispro (ADMELOG) corrective regimen sliding scale  oxyCODONE    IR PRN  polyethylene glycol 3350 PRN  QUEtiapine  senna  tamsulosin      Review of Systems--  A 10-point review of systems was obtained.     Pertinent positives and negatives--  Constitutional: No fevers. No Chills. No Rigors.   Cardiovascular: No chest pain. No palpitations.  Respiratory: No shortness of breath. No cough.  Gastrointestinal: No nausea or vomiting. No diarrhea or constipation.   Psychiatric: Pleasant. Appropriate affect.    Review of systems otherwise negative except as previously noted.    Physical Examination--  Vital Signs: T(F): 97.8 (09-02-21 @ 11:31), Max: 98.1 (09-01-21 @ 23:40)  HR: 62 (09-02-21 @ 11:31)  BP: 110/58 (09-02-21 @ 11:31)  RR: 17 (09-02-21 @ 11:31)  SpO2: 97% (09-02-21 @ 11:31)  Wt(kg): --  General: Nontoxic-appearing Male in no acute distress.  HEENT: AT/NC. PERRL. EOMI. Anicteric. Conjunctiva pink and moist. Oropharynx clear. Dentition fair.  Neck: Not rigid. No sense of mass.  Nodes: None palpable.  Lungs: Clear bilaterally without rales, wheezing or rhonchi  Heart: Regular rate and rhythm. No Murmur. No rub. No gallop. No palpable thrill.  Abdomen: Bowel sounds present and normoactive. Soft. Nondistended. Nontender. No sense of mass. No organomegaly.  Back: No spinal tenderness. No costovertebral angle tenderness.   Extremities: No cyanosis or clubbing. No edema.   Skin: Warm. Dry. Good turgor. No rash. No vasculitic stigmata.  Psychiatric: Appropriate affect and mood for situation.         Laboratory Studies--  CBC                        10.3   7.57  )-----------( 387      ( 01 Sep 2021 07:11 )             32.6       Chemistries  09-01    137  |  102  |  15  ----------------------------<  98  4.2   |  33<H>  |  0.45<L>    Ca    8.7      01 Sep 2021 07:11  Phos  2.8     09-01  Mg     1.9     09-01    TPro  7.1  /  Alb  2.1<L>  /  TBili  0.6  /  DBili  x   /  AST  9<L>  /  ALT  19  /  AlkPhos  85  09-01      Culture Data    Culture - Fungal, Body Fluid (collected 27 Aug 2021 19:02)  Source: .Body Fluid LEFT PLEURAL EFFUSION  Preliminary Report (30 Aug 2021 10:49):    Testing in progress    Culture - Acid Fast - Body Fluid w/Smear (collected 27 Aug 2021 19:02)  Source: .Body Fluid LEFT PLEURAL EFFUSION  Preliminary Report (01 Sep 2021 15:04):    Culture is being performed.    Culture - Body Fluid with Gram Stain (collected 27 Aug 2021 19:02)  Source: .Body Fluid LEFT PLEURAL EFFUSION  Gram Stain (01 Sep 2021 21:38):    Moderate polymorphonuclear leukocytes per low power field    Numerous Gram positive cocci in pairs per oil power field  Final Report (01 Sep 2021 21:38):    Moderate Streptococcus anginosus    Rare Staphylococcus aureus    Few Prevotella species "Susceptibilities not performed"    Rare Fusobacterium nucleatum "Susceptibilities not performed"  Organism: Streptococcus anginosus  Staphylococcus aureus (01 Sep 2021 21:38)  Organism: Staphylococcus aureus (01 Sep 2021 21:38)  Organism: Streptococcus anginosus (01 Sep 2021 21:38)  Organism: Streptococcus anginosus (01 Sep 2021 21:38)             [facility]  Division of Infectious Diseases  [phone]    Name: FESTUS BLAND  Age: 78y  Gender: Male  MRN: [id number]    Interval History--  Notes reviewed. Remains confused. "Let me out of these handcuffs" (wearing mitten restraints)  For what it's worth, denies any other complaints at this time.    Past Medical History--  Hypercholesterolemia    DM (Diabetes Mellitus)    HTN (Hypertension)    CAD (Coronary Artery Disease)    BPH (benign prostatic hyperplasia)    Coronary Stent        For details regarding the patient's social history, family history, and other miscellaneous elements, please refer the initial infectious diseases consultation and/or the admitting history and physical examination for this admission.    Allergies    No Known Allergies    Intolerances        Medications--  Antibiotics:  cefTRIAXone   IVPB 2000 milliGRAM(s) IV Intermittent every 24 hours  cefTRIAXone   IVPB        Immunologic:    Other:  atorvastatin  carbidopa/levodopa  25/100  dextrose 40% Gel  dextrose 5%.  dextrose 5%.  dextrose 50% Injectable  dextrose 50% Injectable  dextrose 50% Injectable  enoxaparin Injectable  glucagon  Injectable  insulin lispro (ADMELOG) corrective regimen sliding scale  oxyCODONE    IR PRN  polyethylene glycol 3350 PRN  QUEtiapine  senna  tamsulosin      Review of Systems--  Review of systems unable secondary to clinical condition.     Physical Examination--  Vital Signs: T(F): 97.8 (09-02-21 @ 11:31), Max: 98.1 (09-01-21 @ 23:40)  HR: 62 (09-02-21 @ 11:31)  BP: 110/58 (09-02-21 @ 11:31)  RR: 17 (09-02-21 @ 11:31)  SpO2: 97% (09-02-21 @ 11:31)  Wt(kg): --  General: Nontoxic appearing man in no distress whatsoever  HEENT: Sclera nonicteric.  The conjunctiva are pink and moist.  The oropharynx is not able to be examined secondary to patient appliance  Neck: Supple.  No sense of mass.  Lungs: Diminished breath sounds left greater than right.  Chest tube, left-sided in situ. SS-purulent looking drainage.   Heart: Regular rate and rhythm.  There is no murmur.  There is no rub or gallop.  There is no palpable thrill.  Abdomen: Soft.  Nondistended.  Nontender.  No sense of mass.  No organomegaly  Back: Unable  Extremities: Wasted.  No cyanosis.  No edema.  +/- clubbing  Skin: Warm, dry, no vasculitic stigmata  Psych: Grossly appropriate mood and affect      Laboratory Studies--  CBC                        10.3   7.57  )-----------( 387      ( 01 Sep 2021 07:11 )             32.6       Chemistries  09-01    137  |  102  |  15  ----------------------------<  98  4.2   |  33<H>  |  0.45<L>    Ca    8.7      01 Sep 2021 07:11  Phos  2.8     09-01  Mg     1.9     09-01    TPro  7.1  /  Alb  2.1<L>  /  TBili  0.6  /  DBili  x   /  AST  9<L>  /  ALT  19  /  AlkPhos  85  09-01      Culture Data    Culture - Fungal, Body Fluid (collected 27 Aug 2021 19:02)  Source: .Body Fluid LEFT PLEURAL EFFUSION  Preliminary Report (30 Aug 2021 10:49):    Testing in progress    Culture - Acid Fast - Body Fluid w/Smear (collected 27 Aug 2021 19:02)  Source: .Body Fluid LEFT PLEURAL EFFUSION  Preliminary Report (01 Sep 2021 15:04):    Culture is being performed.    Culture - Body Fluid with Gram Stain (collected 27 Aug 2021 19:02)  Source: .Body Fluid LEFT PLEURAL EFFUSION  Gram Stain (01 Sep 2021 21:38):    Moderate polymorphonuclear leukocytes per low power field    Numerous Gram positive cocci in pairs per oil power field  Final Report (01 Sep 2021 21:38):    Moderate Streptococcus anginosus    Rare Staphylococcus aureus    Few Prevotella species "Susceptibilities not performed"    Rare Fusobacterium nucleatum "Susceptibilities not performed"  Organism: Streptococcus anginosus  Staphylococcus aureus (01 Sep 2021 21:38)  Organism: Staphylococcus aureus (01 Sep 2021 21:38)  Organism: Streptococcus anginosus (01 Sep 2021 21:38)  Organism: Streptococcus anginosus (01 Sep 2021 21:38)             Patient complaining of shortness of breath x4days. Patient was seen by PCP and given antibiotics and prednisone without improvement. Patient also with productive cough.

## 2025-04-19 NOTE — ED PROVIDER NOTE - CLINICAL SUMMARY MEDICAL DECISION MAKING FREE TEXT BOX
65-year-old female history of asthma hypertension diabetes remote history of breast cancer in remission presenting with cough wheezing shortness of breath similar to prior asthma exacerbations.  Patient reports she has been taking prednisone and was prescribed a Z-Eduardo by her primary care doctor but today she was feeling worse with shortness of breath and chest pressure.  In the emergency room patient underwent chest x-ray lab work and got treatment for her asthma exacerbation.  After treatment patient was feeling much better.  Her wheezing has significantly improved.  Her saturation was noted to be 97% on room air at rest and 95% with exertion.  She felt comfortable being discharged and requested 1 other nebulizer prior to her release.  Patient given strict return precautions

## 2025-04-19 NOTE — ED PROVIDER NOTE - PROGRESS NOTE DETAILS
[facility]  Patient evaluated as per HPI and PE  Patient with 4 days of SOB, cough, wheezing. Chest pain with coughing. Went to PCP, failed outpatient treatment, symptoms not improving.  PE remarkable for wheezing bilaterally. Patient saturating 94% on RA. Improved to 100% on 3LNC.   EKG in triage non-ischemic. Showing ventricular block consistent with prior EKG  Patient with prior admission for Asthma Exacerbation in February 2/2 viral URI requiring BIPAP.   Plan: Labs, CXR, Duonebs, Decadron. Will revaluate. YONG BRANNON:  Wheezing improved.  Patient desaturates to 92% on RA. Returns to 100% on 3L NC  Will admit patient to F for further management of condition and oxygen support.  Patient agreeable with plan.

## 2025-04-20 DIAGNOSIS — J45.901 UNSPECIFIED ASTHMA WITH (ACUTE) EXACERBATION: ICD-10-CM

## 2025-04-20 LAB — D DIMER BLD IA.RAPID-MCNC: <150 NG/ML DDU — SIGNIFICANT CHANGE UP

## 2025-04-20 PROCEDURE — A9540: CPT

## 2025-04-20 PROCEDURE — 78582 LUNG VENTILAT&PERFUS IMAGING: CPT | Mod: MC

## 2025-04-20 PROCEDURE — 83735 ASSAY OF MAGNESIUM: CPT

## 2025-04-20 PROCEDURE — A9539: CPT

## 2025-04-20 PROCEDURE — 85379 FIBRIN DEGRADATION QUANT: CPT

## 2025-04-20 PROCEDURE — 94640 AIRWAY INHALATION TREATMENT: CPT

## 2025-04-20 PROCEDURE — ZZZZZ: CPT

## 2025-04-20 PROCEDURE — 80048 BASIC METABOLIC PNL TOTAL CA: CPT

## 2025-04-20 PROCEDURE — 85025 COMPLETE CBC W/AUTO DIFF WBC: CPT

## 2025-04-20 PROCEDURE — 78582 LUNG VENTILAT&PERFUS IMAGING: CPT | Mod: 26

## 2025-04-20 PROCEDURE — 83605 ASSAY OF LACTIC ACID: CPT

## 2025-04-20 PROCEDURE — 36415 COLL VENOUS BLD VENIPUNCTURE: CPT

## 2025-04-20 RX ORDER — IPRATROPIUM BROMIDE AND ALBUTEROL SULFATE .5; 2.5 MG/3ML; MG/3ML
3 SOLUTION RESPIRATORY (INHALATION)
Refills: 0 | Status: COMPLETED | OUTPATIENT
Start: 2025-04-20 | End: 2025-04-20

## 2025-04-20 RX ORDER — MELATONIN 5 MG
3 TABLET ORAL AT BEDTIME
Refills: 0 | Status: DISCONTINUED | OUTPATIENT
Start: 2025-04-20 | End: 2025-04-21

## 2025-04-20 RX ORDER — MONTELUKAST SODIUM 10 MG/1
10 TABLET ORAL DAILY
Refills: 0 | Status: DISCONTINUED | OUTPATIENT
Start: 2025-04-20 | End: 2025-04-21

## 2025-04-20 RX ORDER — ANASTROZOLE 1 MG/1
1 TABLET ORAL DAILY
Refills: 0 | Status: DISCONTINUED | OUTPATIENT
Start: 2025-04-20 | End: 2025-04-21

## 2025-04-20 RX ORDER — TIOTROPIUM BROMIDE INHALATION SPRAY 3.12 UG/1
2 SPRAY, METERED RESPIRATORY (INHALATION) DAILY
Refills: 0 | Status: DISCONTINUED | OUTPATIENT
Start: 2025-04-20 | End: 2025-04-21

## 2025-04-20 RX ORDER — ATORVASTATIN CALCIUM 80 MG/1
10 TABLET, FILM COATED ORAL AT BEDTIME
Refills: 0 | Status: DISCONTINUED | OUTPATIENT
Start: 2025-04-20 | End: 2025-04-21

## 2025-04-20 RX ORDER — IPRATROPIUM BROMIDE AND ALBUTEROL SULFATE .5; 2.5 MG/3ML; MG/3ML
3 SOLUTION RESPIRATORY (INHALATION) EVERY 6 HOURS
Refills: 0 | Status: DISCONTINUED | OUTPATIENT
Start: 2025-04-20 | End: 2025-04-21

## 2025-04-20 RX ORDER — ACETAMINOPHEN 500 MG/5ML
650 LIQUID (ML) ORAL EVERY 6 HOURS
Refills: 0 | Status: DISCONTINUED | OUTPATIENT
Start: 2025-04-20 | End: 2025-04-21

## 2025-04-20 RX ORDER — IPRATROPIUM BROMIDE AND ALBUTEROL SULFATE .5; 2.5 MG/3ML; MG/3ML
3 SOLUTION RESPIRATORY (INHALATION)
Refills: 0 | Status: DISCONTINUED | OUTPATIENT
Start: 2025-04-20 | End: 2025-04-20

## 2025-04-20 RX ORDER — METHYLPREDNISOLONE ACETATE 80 MG/ML
40 INJECTION, SUSPENSION INTRA-ARTICULAR; INTRALESIONAL; INTRAMUSCULAR; SOFT TISSUE
Refills: 0 | Status: DISCONTINUED | OUTPATIENT
Start: 2025-04-20 | End: 2025-04-21

## 2025-04-20 RX ORDER — INFLUENZA A VIRUS A/IDAHO/07/2018 (H1N1) ANTIGEN (MDCK CELL DERIVED, PROPIOLACTONE INACTIVATED, INFLUENZA A VIRUS A/INDIANA/08/2018 (H3N2) ANTIGEN (MDCK CELL DERIVED, PROPIOLACTONE INACTIVATED), INFLUENZA B VIRUS B/SINGAPORE/INFTT-16-0610/2016 ANTIGEN (MDCK CELL DERIVED, PROPIOLACTONE INACTIVATED), INFLUENZA B VIRUS B/IOWA/06/2017 ANTIGEN (MDCK CELL DERIVED, PROPIOLACTONE INACTIVATED) 15; 15; 15; 15 UG/.5ML; UG/.5ML; UG/.5ML; UG/.5ML
0.5 INJECTION, SUSPENSION INTRAMUSCULAR ONCE
Refills: 0 | Status: DISCONTINUED | OUTPATIENT
Start: 2025-04-20 | End: 2025-04-21

## 2025-04-20 RX ORDER — ENOXAPARIN SODIUM 100 MG/ML
40 INJECTION SUBCUTANEOUS EVERY 24 HOURS
Refills: 0 | Status: DISCONTINUED | OUTPATIENT
Start: 2025-04-20 | End: 2025-04-21

## 2025-04-20 RX ADMIN — ANASTROZOLE 1 MILLIGRAM(S): 1 TABLET ORAL at 12:54

## 2025-04-20 RX ADMIN — Medication 1 DOSE(S): at 16:42

## 2025-04-20 RX ADMIN — IPRATROPIUM BROMIDE AND ALBUTEROL SULFATE 3 MILLILITER(S): .5; 2.5 SOLUTION RESPIRATORY (INHALATION) at 20:34

## 2025-04-20 RX ADMIN — ATORVASTATIN CALCIUM 10 MILLIGRAM(S): 80 TABLET, FILM COATED ORAL at 21:19

## 2025-04-20 RX ADMIN — MONTELUKAST SODIUM 10 MILLIGRAM(S): 10 TABLET ORAL at 12:54

## 2025-04-20 RX ADMIN — METHYLPREDNISOLONE ACETATE 40 MILLIGRAM(S): 80 INJECTION, SUSPENSION INTRA-ARTICULAR; INTRALESIONAL; INTRAMUSCULAR; SOFT TISSUE at 06:00

## 2025-04-20 RX ADMIN — IPRATROPIUM BROMIDE AND ALBUTEROL SULFATE 3 MILLILITER(S): .5; 2.5 SOLUTION RESPIRATORY (INHALATION) at 14:08

## 2025-04-20 RX ADMIN — Medication 3 MILLIGRAM(S): at 21:24

## 2025-04-20 RX ADMIN — IPRATROPIUM BROMIDE AND ALBUTEROL SULFATE 3 MILLILITER(S): .5; 2.5 SOLUTION RESPIRATORY (INHALATION) at 00:50

## 2025-04-20 RX ADMIN — METHYLPREDNISOLONE ACETATE 40 MILLIGRAM(S): 80 INJECTION, SUSPENSION INTRA-ARTICULAR; INTRALESIONAL; INTRAMUSCULAR; SOFT TISSUE at 17:48

## 2025-04-20 RX ADMIN — Medication 1 APPLICATION(S): at 05:00

## 2025-04-20 RX ADMIN — ENOXAPARIN SODIUM 40 MILLIGRAM(S): 100 INJECTION SUBCUTANEOUS at 06:00

## 2025-04-20 RX ADMIN — Medication 40 MILLIGRAM(S): at 06:00

## 2025-04-20 RX ADMIN — Medication 1 DOSE(S): at 20:09

## 2025-04-20 NOTE — H&P ADULT - ATTENDING COMMENTS
Patient seen on 04/20/25.        65F with PMHx Asthma, HLD, Breast Cancer s/p R. Mastectomy s/p Chemo in remission, who presents to the ED for cough, wheezing, and shortness of breath, likely 2/2 asthma exacerbation.     # acute hypoxic respiratory failure 2/2 acute asthma exacerbation vs PE   # hx eosinophilic asthma   # respiratory alkalosis   # lactic acidosis   - recent admission in Feb 2025 for asthma exacerbation   - desaturating to 92% on RA in ED, improved to 100% on 3L NC   - patient reports worsening MAYORGA, unable to walk a few steps without getting SOB   - notes chest pressure radiating to back, worse with inspiration   - VBG w/ pH 7.45/PCO2 38/PO2 86/HCO3 26/lactate 2.2  - trop <6   - RVP negative   - CXR WNL on my read   - s/p 3x duoneb and dexa 10mg PO x1 in ED   - check d-dimer --> if d-dimer elevated -> check V/Q scan (patient has allergy to contrast)   - wean O2 as tolerated   - daily peak flow   - duoneb q6h PRN   - home advair and spiriva interchange   - cont home singular 10mg daily   - also on Nucula injection monthly, usually 20th of each month   - methylprednisone 40mg BID for now (no wheezing on my exam but pt s/p dexa and 3x duoneb; was wheezing per ED note)   - does not follow with pulmonologist currently but has upcoming appt with pulm @ [facility], does not remember name       # HLD   - cont home simvastatin 20mg daily -> atorvastatin interchange     #breast cancer s/p mastectomy, chemo, and radiation, in remission since 2017  - cont home anastrazole 1mg daily   - outpt f/u       MISC:  DVT ppx: lovenox  GI ppx: PPI  Diet: DASH  Activity: ATT  Dispo: Acute  Pending: D-dimer --> VQ scan, repeat lactate, resp status improvement

## 2025-04-20 NOTE — H&P ADULT - NSHPPHYSICALEXAM_GEN_ALL_CORE
GENERAL:   HEENT:   NECK:  CARDIO:   RESPIRATORY:   ABDOMEN:   EXTREMITIES:   NEURO:   SKIN: GENERAL: NAD, well-appearing   HEENT: Sclera clear, EOMI   NECK: Supple, no stiffness, no JVD   CARDIO: Regular rate and rhythm, normal s1s2  RESPIRATORY: On 3L NC, unlabored respirations, b/l air entry, no wheezing or crackles   ABDOMEN: Soft, nontender, nondistended; +BS  EXTREMITIES: No clubbing, cyanosis, or edema  NEURO: AOx3  SKIN: Warm and dry

## 2025-04-20 NOTE — H&P ADULT - ASSESSMENT
65F with PMHx Asthma, HLD, Breast Cancer s/p R. Mastectomy s/p Chemo in remission, who presents to the ED for cough, wheezing, and shortness of breath, likely 2/2 asthma exacerbation.     # acute hypoxic respiratory failure 2/2 acute asthma exacerbation   # respiratory alkalosis   # type B lactic acidosis   - recent admission in Feb 2025 for asthma exacerbation   - desaturating to 92% on RA in ED, improved to 100% on 3L NC   - RVP negative   - CXR WNL on my read   - lactate of 2.2 on VBG likely 2/2 duoneb use; repeat in AM   - s/p 3x duoneb and dexa 10mg PO x1 in ED   - wean O2 as tolerated   - daily peak flow   - check d-dimer   - duoneb q6h PRN   - home advair and spiriva   - methylprednisone 40mg BID while active wheezing     # HLD       #breast cancer s/p mastectomy, chemo, and radiation, in remission   - outpt f/u       MISC:  DVT ppx: lovenox  GI ppx: PPI  Diet: DASH  Activity:ATT  Dispo: Acute         65F with PMHx Asthma, HLD, Breast Cancer s/p R. Mastectomy s/p Chemo in remission, who presents to the ED for cough, wheezing, and shortness of breath, likely 2/2 asthma exacerbation.     # acute hypoxic respiratory failure 2/2 acute asthma exacerbation vs PE   # hx eosinophilic asthma   # respiratory alkalosis   # lactic acidosis   - recent admission in Feb 2025 for asthma exacerbation   - desaturating to 92% on RA in ED, improved to 100% on 3L NC   - patient reports worsening MAYORGA, unable to walk a few steps without getting SOB   - notes chest pressure radiating to back, worse with inspiration   - VBG w/ pH 7.45/PCO2 38/PO2 86/HCO3 26/lactate 2.2  - trop <6   - RVP negative   - CXR WNL on my read   - s/p 3x duoneb and dexa 10mg PO x1 in ED   - check V/Q scan (patient has allergy to contrast)   - wean O2 as tolerated   - daily peak flow   - duoneb q6h PRN   - home advair and spiriva interchange   - cont home singular 10mg daily   - also on Nucula injection monthly, usually 20th of each month   - methylprednisone 40mg BID for now (no wheezing on my exam but pt s/p dexa and 3x duoneb; was wheezing per ED note)   - does not follow with pulmonologist currently but has upcoming appt with pulm @ [facility], does not remember name       # HLD   - cont home simvastatin 20mg daily -> atorvastatin interchange     #breast cancer s/p mastectomy, chemo, and radiation, in remission since 2017  - cont home anastrazole 1mg daily   - outpt f/u       MISC:  DVT ppx: lovenox  GI ppx: PPI  Diet: DASH  Activity: ATT  Dispo: Acute  Pending: VQ scan, repeat lactate, resp status improvement          65F with PMHx Asthma, HLD, Breast Cancer s/p R. Mastectomy s/p Chemo in remission, who presents to the ED for cough, wheezing, and shortness of breath, likely 2/2 asthma exacerbation.     # acute hypoxic respiratory failure 2/2 acute asthma exacerbation vs PE   # hx eosinophilic asthma   # respiratory alkalosis   # lactic acidosis   - recent admission in Feb 2025 for asthma exacerbation   - desaturating to 92% on RA in ED, improved to 100% on 3L NC   - patient reports worsening MAYORGA, unable to walk a few steps without getting SOB   - notes chest pressure radiating to back, worse with inspiration   - VBG w/ pH 7.45/PCO2 38/PO2 86/HCO3 26/lactate 2.2  - trop <6   - RVP negative   - CXR WNL on my read   - s/p 3x duoneb and dexa 10mg PO x1 in ED   - check  V/Q scan (patient has allergy to contrast)   - wean O2 as tolerated   - daily peak flow   - duoneb q6h PRN   - home advair and spiriva interchange   - cont home singular 10mg daily   - also on Nucula injection monthly, usually 20th of each month   - methylprednisone 40mg BID for now (no wheezing on my exam but pt s/p dexa and 3x duoneb; was wheezing per ED note)   - does not follow with pulmonologist currently but has upcoming appt with pulm @ [facility], does not remember name       # HLD   - cont home simvastatin 20mg daily -> atorvastatin interchange     #breast cancer s/p mastectomy, chemo, and radiation, in remission since 2017  - cont home anastrazole 1mg daily   - outpt f/u       MISC:  DVT ppx: lovenox  GI ppx: PPI  Diet: DASH  Activity: ATT  Dispo: Acute  Pending: VQ scan, repeat lactate, resp status improvement          65F with PMHx Asthma, HLD, Breast Cancer s/p R. Mastectomy s/p Chemo in remission, who presents to the ED for cough, wheezing, and shortness of breath, likely 2/2 asthma exacerbation.     # acute hypoxic respiratory failure 2/2 acute asthma exacerbation   # hx eosinophilic asthma   # respiratory alkalosis   # lactic acidosis   - recent admission in Feb 2025 for asthma exacerbation   - desaturating to 92% on RA in ED, improved to 100% on 3L NC   - patient reports worsening MAYORGA, unable to walk a few steps without getting SOB   - notes chest pressure radiating to back, worse with inspiration   - VBG w/ pH 7.45/PCO2 38/PO2 86/HCO3 26/lactate 2.2  - trop <6   - RVP negative   - CXR WNL on my read   - s/p 3x duoneb and dexa 10mg PO x1 in ED   - dimer negative   - wean O2 as tolerated   - daily peak flow   - duoneb q6h PRN   - home advair and spiriva interchange   - cont home singular 10mg daily   - also on Nucula injection monthly, usually 20th of each month   - methylprednisone 40mg BID for now (no wheezing on my exam but pt s/p dexa and 3x duoneb; was wheezing per ED note)   - does not follow with pulmonologist currently but has upcoming appt with pulm @ [facility], does not remember name       # HLD   - cont home simvastatin 20mg daily -> atorvastatin interchange     #breast cancer s/p mastectomy, chemo, and radiation, in remission since 2017  - cont home anastrazole 1mg daily   - outpt f/u       MISC:  DVT ppx: lovenox  GI ppx: PPI  Diet: DASH  Activity: ATT  Dispo: Acute  Pending:  repeat lactate, resp status improvement

## 2025-04-20 NOTE — H&P ADULT - NSHPLABSRESULTS_GEN_ALL_CORE
LABS:                          13.3   7.69  )-----------( 335      ( 19 Apr 2025 20:43 )             39.4     04-19    142  |  105  |  11  ----------------------------<  160[H]  4.4   |  23  |  0.6[L]    Ca    9.2      19 Apr 2025 20:43    TPro  6.6  /  Alb  4.1  /  TBili  0.7  /  DBili  x   /  AST  22  /  ALT  26  /  AlkPhos  67  04-19    LIVER FUNCTIONS - ( 19 Apr 2025 20:43 )  Alb: 4.1 g/dL / Pro: 6.6 g/dL / ALK PHOS: 67 U/L / ALT: 26 U/L / AST: 22 U/L / GGT: x             Urinalysis Basic - ( 19 Apr 2025 20:43 )    Color: x / Appearance: x / SG: x / pH: x  Gluc: 160 mg/dL / Ketone: x  / Bili: x / Urobili: x   Blood: x / Protein: x / Nitrite: x   Leuk Esterase: x / RBC: x / WBC x   Sq Epi: x / Non Sq Epi: x / Bacteria: x LABS:                          13.3   7.69  )-----------( 335      ( 19 Apr 2025 20:43 )             39.4     04-19    142  |  105  |  11  ----------------------------<  160[H]  4.4   |  23  |  0.6[L]    Ca    9.2      19 Apr 2025 20:43    TPro  6.6  /  Alb  4.1  /  TBili  0.7  /  DBili  x   /  AST  22  /  ALT  26  /  AlkPhos  67  04-19    LIVER FUNCTIONS - ( 19 Apr 2025 20:43 )  Alb: 4.1 g/dL / Pro: 6.6 g/dL / ALK PHOS: 67 U/L / ALT: 26 U/L / AST: 22 U/L / GGT: x           VBG - ( 19 Apr 2025 21:26 )  pH: 7.45  /  pCO2: 38    /  pO2: 86    / HCO3: 26    / Base Excess: 2.2   /  SaO2: 97.4  /  Lactate: 2.2        Urinalysis Basic - ( 19 Apr 2025 20:43 )    Color: x / Appearance: x / SG: x / pH: x  Gluc: 160 mg/dL / Ketone: x  / Bili: x / Urobili: x   Blood: x / Protein: x / Nitrite: x   Leuk Esterase: x / RBC: x / WBC x   Sq Epi: x / Non Sq Epi: x / Bacteria: x

## 2025-04-20 NOTE — ED ADULT NURSE NOTE - NSFALLUNIVINTERV_ED_ALL_ED
Bed/Stretcher in lowest position, wheels locked, appropriate side rails in place/Call bell, personal items and telephone in reach/Instruct patient to call for assistance before getting out of bed/chair/stretcher/Non-slip footwear applied when patient is off stretcher/[place] to call system/Physically safe environment - no spills, clutter or unnecessary equipment/Purposeful proactive rounding/Room/bathroom lighting operational, light cord in reach

## 2025-04-20 NOTE — ED ADULT NURSE NOTE - OBJECTIVE STATEMENT
pt c/o SOB and cough for the past 4 days worsening today. denies chest pain. pt states she was given steroids by her pcp and had no improvement,.

## 2025-04-20 NOTE — PATIENT PROFILE ADULT - FALL HARM RISK - HARM RISK INTERVENTIONS

## 2025-04-20 NOTE — ED ADULT NURSE NOTE - NSSUHOSCREENINGYN_ED_ALL_ED
Patient:   KHADAR CANCINO            MRN: [id number]            FIN: [id number]               Age:   21 years     Sex:  FEMALE     :  [birth year]   Associated Diagnoses:   None   Author:   JUVE JACKSON      Basic Information   Reason for admission:  Labor.    Gestational Age:     Gestational Age (EGA) and VIBHA    VIBHA: 2017 EGA: 39w0d Type: Authoritative  Method: Unknown(2017)  Confirmation: Confirmed  Comments: --  Entered by: Karma Chester on 2017    Other VIBHA Calculations for this Pregnancy:  Entered Method Method Date VIBHA EGA (At Entry) Type  2015 Unknown 2015 24w4d Authoritative    .       Chief Complaint   17 11:54           contractions since 0900        History of Present Illness   The patient presents with contractions.  Patient is     Para Information:   : 2   Para Term: 1   Para : 0   Para Abortions: 0   Para Livin.        Review of Systems   Constitutional:  Negative.       Health Status   Allergies:    Allergic Reactions (All)  NKA   Problem list:    Medical  Pregnant / SNOMED CT [id number] / Confirmed  Resolved: Pregnant / SNOMED CT [id number]      Histories      Pregnancy History        Outcome/ Wks/Days    Delivery of Gest.    Child's Length Infant    Date at Birth    Gender of Labor Wt.    Preg.#1 2015 38wk 5d     FEMALE   3317gms    Outcome: Live Birth   Pregnancy Outcome or Result: Vaginal, Forcep Assist   Fetal Complications: Additional Comments:appt made by [facility]     Family History: non-contributory      Prenatal History   No significant maternal complications   Lab from flowsheet   17 12:45 Type And Screen LAB REPORT    WBC 9.2 THOUSAND/mcL    RBC 3.61 MILLION/mcL  LOW    Hemoglobin 9.5 gm/dL  LOW    Hematocrit 29.9 %  LOW    MCV 82.8 fL    MCH 26.3 pg    MCHC 31.8 gm/dL  LOW    RDW-CV 14.5 %    Platelet 200 THOUSAND/mcL    Neutrophils 66 %    Abs Neut 6.0 THOUSAND/mcL    Segs NOT  APPLICABLE    Lymph 28 %    Absolute Lymph 2.6 THOUSAND/mcL    Monocytes 6 %    Abs Mono 0.5 THOUSAND/mcL    Eosinophils 0 %    Absolute Eos 0.0 THOUSAND/mcL  LOW    Basophils 0 %    Absolute Baso 0.0 THOUSAND/mcL    Analyzer ANC NOT APPLICABLE    Percent NRBC NOT APPLICABLE      Social History     non-contributory.        Physical Examination   VS/Measurements   Measurements from flowsheet : Height and Weight   17 11:54 CLINICALWEIGHT 68.2 kg    Weight Method Stated    MEDDOSEWT 68.2 kg    CLINICALHEIGHT 158 cm    Height Method Stated    BSA - Medical History 1.7    BMI-Medical History 27.3 kg/m2         General:  Alert and oriented.    Eye:  Pupils are equal, round and reactive to light.    Cardiovascular:  Normal rate.    Gastrointestinal:  Non-tender.    Genitourinary:  No lesions.    Obstetric Exam     [place]/ Baby A fetal evaluation: fetal movement present, heart tones within normal limits (110 to 160 bpm), assessment of fetal heart tracing reassuring heart rate.     Vagina: within normal limits.     Cervix: cervical exam deferred.     Integumentary:  Warm, Dry, Pink.    Neurologic:  Alert, Oriented.    Psychiatric:  Cooperative.       Review / Management   OB Results Review:  Current Pregnancy   17 11:50 Pre Pregnancy Weight 52.3 kg     Prenatal Education Never     Blood Type Transcribed~ A Positive     HBsAG Transcribed~ Negative     RPR Transcribed~ Non Reactive     Rubella Transcribed~ Immune     HIV Transcribed~ Negative     Group B Strep Transcribed~ Negative (Modified)    Written Birth Plan No     Anesthesia/Pain Medication IV Narcotic     [place] Feeding Plans~ Breast Milk/Formula     Pediatrician Selected baryr     Baby For Adoption No    , Labor   17 15:30 Pain Detail OB Uterine Contractions     OB Pain Interventions Breathing with Contractions    17 15:23 Cervix Dilation 6     Cervix Effacement 100 %     Fetal Station -1     Vaginal Exam Performed By Heidy Dooley     11/02/17 15:00 Pain Detail OB Uterine Contractions     OB Pain Interventions Breathing with Contractions, Relaxation, Reposition    11/02/17 14:48 Cervix Dilation 5     Cervix Effacement 100 %     Fetal Station -1     Vaginal Exam Performed By Heidy Dooley    11/02/17 14:31 Cervix Dilation 5     Cervix Effacement 100 %     Fetal Station -1     Vaginal Exam Performed By Heidy Dooley (Modified)   11/02/17 14:30 Pain Detail OB Uterine Contractions     OB Pain Interventions Breathing with Contractions, Relaxation    11/02/17 14:00 Pain Detail OB Uterine Contractions     OB Pain Interventions Breathing with Contractions, Relaxation    11/02/17 13:32 Cervix Dilation 4     Cervix Effacement 100 %     Fetal Station -2     Vaginal Exam Performed By JUVE JACKSON     Pain Detail OB Uterine Contractions     OB Pain Interventions Breathing with Contractions, Relaxation    11/02/17 12:17 Cervix Dilation 4 cm     Vaginal Exam Performed By JUVE JACKSON    11/02/17 11:57 Cervix Dilation 3     Cervix Effacement 70 %     Fetal Station -2     Vaginal Exam Performed By Karma Avina    11/02/17 11:54 Reason for Visit OB Labor Check     Last Oral Intake 11/02/17 10:00     Fetal Movement Present     Contractions Subjective Yes     Contraction Onset Date/Time Subjective 11/02/17 09:00     Contraction Frequency Subjective 3min     Leaking Fluid Subjective No     Vaginal Bleeding No    .       Impression and Plan   Diagnosis     active labor.     Fetal condition:  Reassuring fetal heart rate.    Maternal condition:  Stable.    Plan     Admit.         Cervix:     /        /          position:   vertex                 Electronically Signed On 11/02/2017 15:52  __________________________________________________   JUVE JACKSON     Yes - the patient is able to be screened

## 2025-04-20 NOTE — H&P ADULT - HISTORY OF PRESENT ILLNESS
65F with PMHx Asthma, HLD, Breast Cancer s/p R. Mastectomy s/p Chemo in remission, who presents to the ED for cough, wheezing, and shortness of breath. Patient refers that she has a history of severe asthma, never requiring intubation, but necessitating respiratory support and hospital admission. Patient refers that 4 days ago she started to feel short of breath and was coughing. Patient tried her inhaler, but it did not resolve her symptoms so she went to her PCP the following day who evaluated her and sent her home with Prednisone and Azithromycin. Patient took the medications as instructed, but refers that her symptoms have not improved and that her chest hurts when she coughs so she decided to come to the ED to be evaluated. Otherwise denies fevers, headache, chills, n/v/d, abd pain, back pain, changes in urinary/stool habitus, calf tenderness or swelling, recent travel, and sick contacts. Uses albuterol inhaler and nebulizer PRN. Has local PCP. Routine vaccinations up-to-date. Does not smoke, drink, or use illicit substances. Works for [employer].    Vitals:   · BP Systolic	111 mm Hg  · BP Diastolic	74 mm Hg  · Heart Rate	95 /min  · Respiration Rate (breaths/min)	18 /min  · Temp (F)	98.2 Degrees F  · SpO2 (%)	92 % on RA -> 100% on 3L NC     Labs:   WBC 7.69  Hgb 13.3   VBG with pH 7.45/PCO2 38/PO2 86/HCO3 26/lactate 2.2  RVP neg     CXR:  No focal consolidation, PTX, or effusion (my read)     Management in ED:   3x duoneb   10mg PO dexa x1     Admitted to medicine for asthma exacerbation    65F with PMHx Asthma, HLD, Breast Cancer s/p R. Mastectomy s/p Chemo in remission, who presents to the ED for cough, wheezing, and shortness of breath. Patient refers that she has a history of severe asthma, never requiring intubation, but necessitating respiratory support and hospital admission. Patient refers that 4 days ago she started to feel short of breath and was coughing. Patient tried her inhaler, but it did not resolve her symptoms so she went to her PCP the following day who evaluated her and sent her home with Prednisone and Azithromycin. Patient took the medications as instructed, but refers that her symptoms have not improved.  Otherwise denies fevers, headache, chills, n/v/d, abd pain, back pain, changes in urinary/stool habitus, calf tenderness or swelling, recent travel, and sick contacts. Uses albuterol inhaler and nebulizer PRN. Has local PCP. Routine vaccinations up-to-date. Does not smoke, drink, or use illicit substances. Works for [employer].    Vitals:   · BP Systolic	111 mm Hg  · BP Diastolic	74 mm Hg  · Heart Rate	95 /min  · Respiration Rate (breaths/min)	18 /min  · Temp (F)	98.2 Degrees F  · SpO2 (%)	92 % on RA -> 100% on 3L NC     Labs:   WBC 7.69  Hgb 13.3   VBG with pH 7.45/PCO2 38/PO2 86/HCO3 26/lactate 2.2  RVP neg     CXR:  No focal consolidation, PTX, or effusion (my read)     Management in ED:   3x duoneb   10mg PO dexa x1     Admitted to medicine for asthma exacerbation    65F with PMHx Asthma, HLD, Breast Cancer s/p R. Mastectomy s/p Chemo in remission, who presents to the ED for cough, wheezing, and shortness of breath. Patient refers that she has a history of severe asthma, never requiring intubation, but necessitating respiratory support and hospital admission. Patient refers that 4 days ago she started to feel short of breath and was coughing up greenish sputum. Patient tried her inhaler, but it did not resolve her symptoms so she went to her PCP the following day who evaluated her and sent her home with Prednisone and Azithromycin. Patient took the medications as instructed, but states that her symptoms have not improved. She notes that she has had worsening MAYORGA, unable to walk a few steps without getting SOB. Also reports chest pressure that radiates to her back, worse with deep inspiration. Otherwise denies fevers, headache, chills, n/v/d, abd pain, back pain, changes in urinary/stool habitus, calf tenderness or swelling, recent travel, and sick contacts. Uses albuterol inhaler and nebulizer PRN. Has local PCP. Routine vaccinations up-to-date. Does not smoke, drink, or use illicit substances.     Vitals:   · BP Systolic	111 mm Hg  · BP Diastolic	74 mm Hg  · Heart Rate	95 /min  · Respiration Rate (breaths/min)	18 /min  · Temp (F)	98.2 Degrees F  · SpO2 (%)	92 % on RA -> 100% on 3L NC     Labs:   WBC 7.69  Hgb 13.3   VBG with pH 7.45/PCO2 38/PO2 86/HCO3 26/lactate 2.2  RVP neg     CXR:  No focal consolidation, PTX, or effusion (my read)     Management in ED:   3x duoneb   10mg PO dexa x1     Admitted to medicine for acute asthma exacerbation vs PE

## 2025-04-20 NOTE — ED ADULT NURSE NOTE - CHIEF COMPLAINT QUOTE
Patient complaining of shortness of breath x4days. Patient was seen by PCP and given antibiotics and prednisone without improvement. Patient also with productive cough.

## 2025-04-21 ENCOUNTER — TRANSCRIPTION ENCOUNTER (OUTPATIENT)
Age: 65
End: 2025-04-21

## 2025-04-21 VITALS — OXYGEN SATURATION: 97 %

## 2025-04-21 LAB
ANION GAP SERPL CALC-SCNC: 13 MMOL/L — SIGNIFICANT CHANGE UP (ref 7–14)
BASOPHILS # BLD AUTO: 0.02 K/UL — SIGNIFICANT CHANGE UP (ref 0–0.2)
BASOPHILS NFR BLD AUTO: 0.2 % — SIGNIFICANT CHANGE UP (ref 0–1)
BUN SERPL-MCNC: 29 MG/DL — HIGH (ref 10–20)
CALCIUM SERPL-MCNC: 9.6 MG/DL — SIGNIFICANT CHANGE UP (ref 8.4–10.5)
CHLORIDE SERPL-SCNC: 102 MMOL/L — SIGNIFICANT CHANGE UP (ref 98–110)
CO2 SERPL-SCNC: 25 MMOL/L — SIGNIFICANT CHANGE UP (ref 17–32)
CREAT SERPL-MCNC: 0.7 MG/DL — SIGNIFICANT CHANGE UP (ref 0.7–1.5)
EGFR: 95 ML/MIN/1.73M2 — SIGNIFICANT CHANGE UP
EGFR: 95 ML/MIN/1.73M2 — SIGNIFICANT CHANGE UP
EOSINOPHIL # BLD AUTO: 0.01 K/UL — SIGNIFICANT CHANGE UP (ref 0–0.7)
EOSINOPHIL NFR BLD AUTO: 0.1 % — SIGNIFICANT CHANGE UP (ref 0–8)
GLUCOSE SERPL-MCNC: 136 MG/DL — HIGH (ref 70–99)
HCT VFR BLD CALC: 39 % — SIGNIFICANT CHANGE UP (ref 37–47)
HGB BLD-MCNC: 13.1 G/DL — SIGNIFICANT CHANGE UP (ref 12–16)
IMM GRANULOCYTES NFR BLD AUTO: 0.7 % — HIGH (ref 0.1–0.3)
LACTATE SERPL-SCNC: 2.4 MMOL/L — HIGH (ref 0.7–2)
LYMPHOCYTES # BLD AUTO: 1.93 K/UL — SIGNIFICANT CHANGE UP (ref 1.2–3.4)
LYMPHOCYTES # BLD AUTO: 15.5 % — LOW (ref 20.5–51.1)
MAGNESIUM SERPL-MCNC: 2.4 MG/DL — SIGNIFICANT CHANGE UP (ref 1.8–2.4)
MCHC RBC-ENTMCNC: 30 PG — SIGNIFICANT CHANGE UP (ref 27–31)
MCHC RBC-ENTMCNC: 33.6 G/DL — SIGNIFICANT CHANGE UP (ref 32–37)
MCV RBC AUTO: 89.4 FL — SIGNIFICANT CHANGE UP (ref 81–99)
MONOCYTES # BLD AUTO: 1.14 K/UL — HIGH (ref 0.1–0.6)
MONOCYTES NFR BLD AUTO: 9.1 % — SIGNIFICANT CHANGE UP (ref 1.7–9.3)
NEUTROPHILS # BLD AUTO: 9.29 K/UL — HIGH (ref 1.4–6.5)
NEUTROPHILS NFR BLD AUTO: 74.4 % — SIGNIFICANT CHANGE UP (ref 42.2–75.2)
NRBC BLD AUTO-RTO: 0 /100 WBCS — SIGNIFICANT CHANGE UP (ref 0–0)
PLATELET # BLD AUTO: 344 K/UL — SIGNIFICANT CHANGE UP (ref 130–400)
PMV BLD: 9.6 FL — SIGNIFICANT CHANGE UP (ref 7.4–10.4)
POTASSIUM SERPL-MCNC: 4.4 MMOL/L — SIGNIFICANT CHANGE UP (ref 3.5–5)
POTASSIUM SERPL-SCNC: 4.4 MMOL/L — SIGNIFICANT CHANGE UP (ref 3.5–5)
RBC # BLD: 4.36 M/UL — SIGNIFICANT CHANGE UP (ref 4.2–5.4)
RBC # FLD: 14.4 % — SIGNIFICANT CHANGE UP (ref 11.5–14.5)
SODIUM SERPL-SCNC: 140 MMOL/L — SIGNIFICANT CHANGE UP (ref 135–146)
WBC # BLD: 12.48 K/UL — HIGH (ref 4.8–10.8)
WBC # FLD AUTO: 12.48 K/UL — HIGH (ref 4.8–10.8)

## 2025-04-21 PROCEDURE — 99239 HOSP IP/OBS DSCHRG MGMT >30: CPT

## 2025-04-21 RX ORDER — AMOXICILLIN AND CLAVULANATE POTASSIUM 500; 125 MG/1; MG/1
1 TABLET, FILM COATED ORAL
Qty: 10 | Refills: 0
Start: 2025-04-21 | End: 2025-04-25

## 2025-04-21 RX ORDER — PREDNISONE 20 MG/1
2 TABLET ORAL
Qty: 10 | Refills: 0
Start: 2025-04-21 | End: 2025-04-25

## 2025-04-21 RX ADMIN — IPRATROPIUM BROMIDE AND ALBUTEROL SULFATE 3 MILLILITER(S): .5; 2.5 SOLUTION RESPIRATORY (INHALATION) at 09:50

## 2025-04-21 RX ADMIN — ENOXAPARIN SODIUM 40 MILLIGRAM(S): 100 INJECTION SUBCUTANEOUS at 05:36

## 2025-04-21 RX ADMIN — TIOTROPIUM BROMIDE INHALATION SPRAY 2 PUFF(S): 3.12 SPRAY, METERED RESPIRATORY (INHALATION) at 07:58

## 2025-04-21 RX ADMIN — MONTELUKAST SODIUM 10 MILLIGRAM(S): 10 TABLET ORAL at 11:13

## 2025-04-21 RX ADMIN — IPRATROPIUM BROMIDE AND ALBUTEROL SULFATE 3 MILLILITER(S): .5; 2.5 SOLUTION RESPIRATORY (INHALATION) at 16:58

## 2025-04-21 RX ADMIN — METHYLPREDNISOLONE ACETATE 40 MILLIGRAM(S): 80 INJECTION, SUSPENSION INTRA-ARTICULAR; INTRALESIONAL; INTRAMUSCULAR; SOFT TISSUE at 05:36

## 2025-04-21 RX ADMIN — Medication 1 APPLICATION(S): at 05:40

## 2025-04-21 RX ADMIN — Medication 40 MILLIGRAM(S): at 05:36

## 2025-04-21 RX ADMIN — ANASTROZOLE 1 MILLIGRAM(S): 1 TABLET ORAL at 11:12

## 2025-04-21 NOTE — DISCHARGE NOTE PROVIDER - CARE PROVIDER_API CALL
Charly Alvarado .  Internal Medicine  [address] [place] [address]  Phone: [phone]  Fax: [phone]  Follow Up Time: 1 week

## 2025-04-21 NOTE — DISCHARGE NOTE PROVIDER - ATTENDING DISCHARGE PHYSICAL EXAMINATION:
T(F): 97.7 (04-21-25 @ 04:43), Max: 98.4 (04-20-25 @ 21:00)  HR: 77 (04-21-25 @ 04:43) (77 - 84)  BP: 110/70 (04-21-25 @ 04:43) (110/70 - 123/82)  RR: 18 (04-21-25 @ 09:45) (18 - 18)  SpO2: 97% (04-21-25 @ 13:44) (96% - 99%)  Physical exam:   constitutional NAD, AAOX3, Respiratory  lungs CTA, CVS heart RRR, GI: abdomen Soft NT, ND, BS+, skin: intact  neuro exam Motor, sensory and CN normal, no deficit

## 2025-04-21 NOTE — DISCHARGE NOTE NURSING/CASE MANAGEMENT/SOCIAL WORK - NSTRANSFERBELONGINGSRESP_GEN_A_NUR
yes carBAMazepine 100 mg/5 mL oral suspension: 10 milliliter(s) orally every 8 hours  valproic acid 250 mg/5 mL oral liquid:  orally

## 2025-04-21 NOTE — DISCHARGE NOTE PROVIDER - HOSPITAL COURSE
65F with PMHx Asthma, HLD, Breast Cancer s/p R. Mastectomy s/p Chemo in remission, who presents to the ED for cough, wheezing, and shortness of breath. Patient refers that she has a history of severe asthma, never requiring intubation, but necessitating respiratory support and hospital admission. Patient refers that 4 days ago she started to feel short of breath and was coughing up greenish sputum. Patient tried her inhaler, but it did not resolve her symptoms so she went to her PCP the following day who evaluated her and sent her home with Prednisone and Azithromycin. Patient took the medications as instructed, but states that her symptoms have not improved. She notes that she has had worsening MAYORGA, unable to walk a few steps without getting SOB. Also reports chest pressure that radiates to her back, worse with deep inspiration. Otherwise denies fevers, headache, chills, n/v/d, abd pain, back pain, changes in urinary/stool habitus, calf tenderness or swelling, recent travel, and sick contacts. Uses albuterol inhaler and nebulizer PRN. Has local PCP. Routine vaccinations up-to-date. Does not smoke, drink, or use illicit substances.     Hospital course:  Patient was admitted for an asthma exacerbation and was treated with steroids and nebulizers, and her condition improved. Patient was on supplemental oxygen, which was weaned off. VQ scan was negative. Pt is allergic to dogs but is not willing to give up her dog because of emotional attachment. Recommend allergist eval for possible allergy shots as outpatient.    Discussion of discharge plan of care, including discharge diagnoses, medication reconciliation, and follow-ups was conducted with  *****on ***** at *****, and discharge was approved.

## 2025-04-21 NOTE — PROGRESS NOTE ADULT - SUBJECTIVE AND OBJECTIVE BOX
pt seen and examined.     My notes supersede resident's notes in case of discrepancy       ROS: no cp, no sob, no n/v, no fever    Vital Signs Last 24 Hrs  T(C): 36.5 (21 Apr 2025 04:43), Max: 36.9 (20 Apr 2025 21:00)  T(F): 97.7 (21 Apr 2025 04:43), Max: 98.4 (20 Apr 2025 21:00)  HR: 77 (21 Apr 2025 04:43) (77 - 84)  BP: 110/70 (21 Apr 2025 04:43) (110/70 - 125/77)  BP(mean): 95 (20 Apr 2025 21:00) (95 - 95)  RR: 18 (21 Apr 2025 09:45) (18 - 18)  SpO2: 98% (21 Apr 2025 09:45) (96% - 99%)    Parameters below as of 21 Apr 2025 09:45  Patient On (Oxygen Delivery Method): room air    physical exam  constitutional NAD, AAOX3, Respiratory  lungs CTA, CVS heart RRR, GI: abdomen Soft NT, ND, BS+, skin: intact  neuro exam no focal deficit     MEDICATIONS  (STANDING):  anastrozole 1 milliGRAM(s) Oral daily  atorvastatin 10 milliGRAM(s) Oral at bedtime  chlorhexidine 2% Cloths 1 Application(s) Topical <User Schedule>  enoxaparin Injectable 40 milliGRAM(s) SubCutaneous every 24 hours  fluticasone propionate/ salmeterol 500-50 MICROgram(s) Diskus 1 Dose(s) Inhalation two times a day  influenza  Vaccine (HIGH DOSE) 0.5 milliLiter(s) IntraMuscular once  methylPREDNISolone sodium succinate Injectable 40 milliGRAM(s) IV Push two times a day  montelukast 10 milliGRAM(s) Oral daily  pantoprazole    Tablet 40 milliGRAM(s) Oral before breakfast  tiotropium 2.5 MICROgram(s) Inhaler 2 Puff(s) Inhalation daily    MEDICATIONS  (PRN):  acetaminophen     Tablet .. 650 milliGRAM(s) Oral every 6 hours PRN Temp greater or equal to 38C (100.4F), Mild Pain (1 - 3)  albuterol/ipratropium for Nebulization 3 milliLiter(s) Nebulizer every 6 hours PRN Shortness of Breath and/or Wheezing  melatonin 3 milliGRAM(s) Oral at bedtime PRN for insomnia                        13.1   12.48 )-----------( 344      ( 21 Apr 2025 05:43 )             39.0     04-21    140  |  102  |  29[H]  ----------------------------<  136[H]  4.4   |  25  |  0.7    Ca    9.6      21 Apr 2025 05:43  Mg     2.4     04-21    TPro  6.6  /  Alb  4.1  /  TBili  0.7  /  DBili  x   /  AST  22  /  ALT  26  /  AlkPhos  67  04-19    D-Dimer Assay, Quantitative: <150 ng/mL DDU (04-20-25 @ 06:50)    a/p  65F with PMHx Asthma, HLD, Breast Cancer s/p R. Mastectomy s/p Chemo in remission, who presents to the ED for cough, wheezing, and shortness of breath. Patient refers that she has a history of severe asthma, never requiring intubation, but necessitating respiratory support and hospital admission. pt is allergic to dogs but is not willing to give up her dog because of emotional attachment     # Asthma exac, allergic to dogs   cont nebs, steroids   check o2 off nc   VQ scan neg  recommend allergist eval for possible allergy shots as outpt     discharg planning     #Progress Note Handoff    Pending :  check o2 on room air on ambulation   Family discussion: dw pt   Disposition: home   code status: full code

## 2025-04-21 NOTE — DISCHARGE NOTE PROVIDER - NSDCCPCAREPLAN_GEN_ALL_CORE_FT
PRINCIPAL DISCHARGE DIAGNOSIS  Diagnosis: Asthma exacerbation  Assessment and Plan of Treatment: You were admitted to the [facility] for an asthma exacerbation. You were treated with steroids, nebullizers and oxygen therapy, and your condition improved. Please take all medications as prescribed. Please follow up with your primary doctor outpatient. Please see an allergy specialist outpatient. Return to the hospital with any new or worsening symptoms.

## 2025-04-21 NOTE — DISCHARGE NOTE NURSING/CASE MANAGEMENT/SOCIAL WORK - FINANCIAL ASSISTANCE
[facility] provides services at a reduced cost to those who are determined to be eligible through [facility]’s financial assistance program. Information regarding [facility]’s financial assistance program can be found by going to [email or web address] or by calling [phone].

## 2025-04-21 NOTE — DISCHARGE NOTE NURSING/CASE MANAGEMENT/SOCIAL WORK - PATIENT PORTAL LINK FT
You can access the FollowMyHealth Patient Portal offered by [facility] by registering at the following website: [email or web address]. By joining [facility] [facility]’s FollowMyHealth portal, you will also be able to view your health information using other applications (apps) compatible with our system.

## 2025-04-21 NOTE — DISCHARGE NOTE PROVIDER - NSDCMRMEDTOKEN_GEN_ALL_CORE_FT
Advair Diskus 500 mcg-50 mcg/inh inhalation powder: 1 puff(s) inhaled 2 times a day  Albuterol (Eqv-ProAir HFA) 90 mcg/inh inhalation aerosol: 2 puff(s) inhaled every 6 hours as needed for  shortness of breath and/or wheezing  anastrozole 1 mg oral tablet: 1 tab(s) orally once a day  melatonin 3 mg oral tablet: 1 tab(s) orally once a day (at bedtime) as needed for  insomnia  montelukast 10 mg oral tablet: 1 tab(s) orally once a day  Protonix 40 mg oral delayed release tablet: 1 tab(s) orally once a day  simvastatin 20 mg oral tablet: 1 tab(s) orally once a day (at bedtime)  Spiriva HandiHaler 18 mcg inhalation capsule: 1 cap(s) inhaled once a day   Advair Diskus 500 mcg-50 mcg/inh inhalation powder: 1 puff(s) inhaled 2 times a day  Albuterol (Eqv-ProAir HFA) 90 mcg/inh inhalation aerosol: 2 puff(s) inhaled every 6 hours as needed for  shortness of breath and/or wheezing  amoxicillin-clavulanate 875 mg-125 mg oral tablet: 1 tab(s) orally every 12 hours  anastrozole 1 mg oral tablet: 1 tab(s) orally once a day  melatonin 3 mg oral tablet: 1 tab(s) orally once a day (at bedtime) as needed for  insomnia  montelukast 10 mg oral tablet: 1 tab(s) orally once a day  predniSONE 20 mg oral tablet: 2 tab(s) orally once a day Take 2 tabs daily for 5 days then stop  Protonix 40 mg oral delayed release tablet: 1 tab(s) orally once a day  simvastatin 20 mg oral tablet: 1 tab(s) orally once a day (at bedtime)  Spiriva HandiHaler 18 mcg inhalation capsule: 1 cap(s) inhaled once a day